# Patient Record
Sex: MALE | Race: BLACK OR AFRICAN AMERICAN | NOT HISPANIC OR LATINO | Employment: UNEMPLOYED | URBAN - METROPOLITAN AREA
[De-identification: names, ages, dates, MRNs, and addresses within clinical notes are randomized per-mention and may not be internally consistent; named-entity substitution may affect disease eponyms.]

---

## 2022-08-11 LAB
BASOPHILS # BLD AUTO: 0.6 % (ref 0–1.8)
BASOPHILS # BLD: 0.08 K/UL (ref 0–0.12)
EOSINOPHIL # BLD AUTO: 0.11 K/UL (ref 0–0.51)
EOSINOPHIL NFR BLD: 0.8 % (ref 0–6.9)
ERYTHROCYTE [DISTWIDTH] IN BLOOD BY AUTOMATED COUNT: 53.5 FL (ref 35.9–50)
HCT VFR BLD AUTO: 44.7 % (ref 42–52)
HGB BLD-MCNC: 15 G/DL (ref 14–18)
IMM GRANULOCYTES # BLD AUTO: 0.09 K/UL (ref 0–0.11)
IMM GRANULOCYTES NFR BLD AUTO: 0.6 % (ref 0–0.9)
LYMPHOCYTES # BLD AUTO: 1.91 K/UL (ref 1–4.8)
LYMPHOCYTES NFR BLD: 13.6 % (ref 22–41)
MCH RBC QN AUTO: 30 PG (ref 27–33)
MCHC RBC AUTO-ENTMCNC: 33.6 G/DL (ref 33.7–35.3)
MCV RBC AUTO: 89.4 FL (ref 81.4–97.8)
MONOCYTES # BLD AUTO: 1.44 K/UL (ref 0–0.85)
MONOCYTES NFR BLD AUTO: 10.3 % (ref 0–13.4)
NEUTROPHILS # BLD AUTO: 10.4 K/UL (ref 1.82–7.42)
NEUTROPHILS NFR BLD: 74.1 % (ref 44–72)
NRBC # BLD AUTO: 0 K/UL
NRBC BLD-RTO: 0 /100 WBC
PLATELET # BLD AUTO: 404 K/UL (ref 164–446)
PMV BLD AUTO: 9.1 FL (ref 9–12.9)
RBC # BLD AUTO: 5 M/UL (ref 4.7–6.1)
WBC # BLD AUTO: 14 K/UL (ref 4.8–10.8)

## 2022-08-11 PROCEDURE — 83690 ASSAY OF LIPASE: CPT

## 2022-08-11 PROCEDURE — 85025 COMPLETE CBC W/AUTO DIFF WBC: CPT

## 2022-08-11 PROCEDURE — 99285 EMERGENCY DEPT VISIT HI MDM: CPT

## 2022-08-11 PROCEDURE — 36415 COLL VENOUS BLD VENIPUNCTURE: CPT

## 2022-08-11 PROCEDURE — 80053 COMPREHEN METABOLIC PANEL: CPT

## 2022-08-12 ENCOUNTER — APPOINTMENT (OUTPATIENT)
Dept: RADIOLOGY | Facility: MEDICAL CENTER | Age: 42
DRG: 871 | End: 2022-08-12
Attending: INTERNAL MEDICINE
Payer: COMMERCIAL

## 2022-08-12 ENCOUNTER — APPOINTMENT (OUTPATIENT)
Dept: RADIOLOGY | Facility: MEDICAL CENTER | Age: 42
DRG: 871 | End: 2022-08-12
Attending: EMERGENCY MEDICINE
Payer: COMMERCIAL

## 2022-08-12 ENCOUNTER — HOSPITAL ENCOUNTER (INPATIENT)
Facility: MEDICAL CENTER | Age: 42
LOS: 3 days | DRG: 871 | End: 2022-08-15
Attending: EMERGENCY MEDICINE | Admitting: INTERNAL MEDICINE
Payer: COMMERCIAL

## 2022-08-12 DIAGNOSIS — N17.9 AKI (ACUTE KIDNEY INJURY) (HCC): ICD-10-CM

## 2022-08-12 DIAGNOSIS — R10.10 PAIN OF UPPER ABDOMEN: ICD-10-CM

## 2022-08-12 DIAGNOSIS — N28.0 KIDNEY INFARCTION (HCC): ICD-10-CM

## 2022-08-12 DIAGNOSIS — I50.20 HFREF (HEART FAILURE WITH REDUCED EJECTION FRACTION) (HCC): ICD-10-CM

## 2022-08-12 DIAGNOSIS — I50.23 CHF (CONGESTIVE HEART FAILURE), NYHA CLASS II, ACUTE ON CHRONIC, SYSTOLIC (HCC): ICD-10-CM

## 2022-08-12 PROBLEM — R65.10 SIRS (SYSTEMIC INFLAMMATORY RESPONSE SYNDROME) (HCC): Status: ACTIVE | Noted: 2022-08-12

## 2022-08-12 PROBLEM — N39.0 UTI (URINARY TRACT INFECTION): Status: ACTIVE | Noted: 2022-08-12

## 2022-08-12 PROBLEM — A41.9 SEPSIS (HCC): Status: ACTIVE | Noted: 2022-08-12

## 2022-08-12 PROBLEM — E66.9 OBESITY: Status: ACTIVE | Noted: 2022-08-12

## 2022-08-12 PROBLEM — R10.9 ABDOMINAL PAIN: Status: ACTIVE | Noted: 2022-08-12

## 2022-08-12 PROBLEM — R74.8 ELEVATED LIPASE: Status: ACTIVE | Noted: 2022-08-12

## 2022-08-12 PROBLEM — J18.9 PNEUMONIA: Status: ACTIVE | Noted: 2022-08-12

## 2022-08-12 PROBLEM — Z86.79 HISTORY OF CHRONIC CHF: Status: ACTIVE | Noted: 2022-08-12

## 2022-08-12 PROBLEM — K59.00 CN (CONSTIPATION): Status: ACTIVE | Noted: 2022-08-12

## 2022-08-12 LAB
ALBUMIN SERPL BCP-MCNC: 3.7 G/DL (ref 3.2–4.9)
ALBUMIN/GLOB SERPL: 0.9 G/DL
ALP SERPL-CCNC: 139 U/L (ref 30–99)
ALT SERPL-CCNC: 51 U/L (ref 2–50)
ANION GAP SERPL CALC-SCNC: 16 MMOL/L (ref 7–16)
APPEARANCE UR: CLEAR
APTT PPP: 26.7 SEC (ref 24.7–36)
AST SERPL-CCNC: 42 U/L (ref 12–45)
BACTERIA #/AREA URNS HPF: NEGATIVE /HPF
BILIRUB SERPL-MCNC: 1.3 MG/DL (ref 0.1–1.5)
BILIRUB UR QL STRIP.AUTO: NEGATIVE
BUN SERPL-MCNC: 19 MG/DL (ref 8–22)
CALCIUM SERPL-MCNC: 9.3 MG/DL (ref 8.5–10.5)
CHLORIDE SERPL-SCNC: 96 MMOL/L (ref 96–112)
CO2 SERPL-SCNC: 21 MMOL/L (ref 20–33)
COLOR UR: YELLOW
CREAT SERPL-MCNC: 1.7 MG/DL (ref 0.5–1.4)
CREAT UR-MCNC: 185.73 MG/DL
EKG IMPRESSION: NORMAL
EKG IMPRESSION: NORMAL
EPI CELLS #/AREA URNS HPF: NEGATIVE /HPF
FLUAV RNA SPEC QL NAA+PROBE: NEGATIVE
FLUBV RNA SPEC QL NAA+PROBE: NEGATIVE
GFR SERPLBLD CREATININE-BSD FMLA CKD-EPI: 50 ML/MIN/1.73 M 2
GLOBULIN SER CALC-MCNC: 4.2 G/DL (ref 1.9–3.5)
GLUCOSE SERPL-MCNC: 118 MG/DL (ref 65–99)
GLUCOSE UR STRIP.AUTO-MCNC: NEGATIVE MG/DL
HEMOCCULT STL QL: NEGATIVE
HYALINE CASTS #/AREA URNS LPF: ABNORMAL /LPF
INR PPP: 1.16 (ref 0.87–1.13)
KETONES UR STRIP.AUTO-MCNC: NEGATIVE MG/DL
LACTATE SERPL-SCNC: 1.1 MMOL/L (ref 0.5–2)
LACTATE SERPL-SCNC: 1.3 MMOL/L (ref 0.5–2)
LACTATE SERPL-SCNC: 1.6 MMOL/L (ref 0.5–2)
LDH SERPL L TO P-CCNC: 831 U/L (ref 107–266)
LEUKOCYTE ESTERASE UR QL STRIP.AUTO: ABNORMAL
LIPASE SERPL-CCNC: 107 U/L (ref 11–82)
MICRO URNS: ABNORMAL
NITRITE UR QL STRIP.AUTO: NEGATIVE
NT-PROBNP SERPL IA-MCNC: 904 PG/ML (ref 0–125)
PH UR STRIP.AUTO: 6 [PH] (ref 5–8)
POTASSIUM SERPL-SCNC: 4.3 MMOL/L (ref 3.6–5.5)
PROCALCITONIN SERPL-MCNC: 0.71 NG/ML
PROT SERPL-MCNC: 7.9 G/DL (ref 6–8.2)
PROT UR QL STRIP: 30 MG/DL
PROTHROMBIN TIME: 14.7 SEC (ref 12–14.6)
RBC # URNS HPF: ABNORMAL /HPF
RBC UR QL AUTO: NEGATIVE
RSV RNA SPEC QL NAA+PROBE: NEGATIVE
SARS-COV-2 RNA RESP QL NAA+PROBE: NOTDETECTED
SODIUM SERPL-SCNC: 133 MMOL/L (ref 135–145)
SODIUM UR-SCNC: 22 MMOL/L
SP GR UR STRIP.AUTO: 1.03
SPECIMEN SOURCE: NORMAL
UROBILINOGEN UR STRIP.AUTO-MCNC: 2 MG/DL
WBC #/AREA URNS HPF: ABNORMAL /HPF

## 2022-08-12 PROCEDURE — 85303 CLOT INHIBIT PROT C ACTIVITY: CPT

## 2022-08-12 PROCEDURE — 74174 CTA ABD&PLVS W/CONTRAST: CPT

## 2022-08-12 PROCEDURE — 700105 HCHG RX REV CODE 258: Performed by: INTERNAL MEDICINE

## 2022-08-12 PROCEDURE — C9803 HOPD COVID-19 SPEC COLLECT: HCPCS | Performed by: EMERGENCY MEDICINE

## 2022-08-12 PROCEDURE — 700111 HCHG RX REV CODE 636 W/ 250 OVERRIDE (IP): Performed by: EMERGENCY MEDICINE

## 2022-08-12 PROCEDURE — 87040 BLOOD CULTURE FOR BACTERIA: CPT

## 2022-08-12 PROCEDURE — 700101 HCHG RX REV CODE 250: Performed by: INTERNAL MEDICINE

## 2022-08-12 PROCEDURE — 84145 PROCALCITONIN (PCT): CPT

## 2022-08-12 PROCEDURE — 700102 HCHG RX REV CODE 250 W/ 637 OVERRIDE(OP): Performed by: INTERNAL MEDICINE

## 2022-08-12 PROCEDURE — 83605 ASSAY OF LACTIC ACID: CPT | Mod: 91

## 2022-08-12 PROCEDURE — 36415 COLL VENOUS BLD VENIPUNCTURE: CPT

## 2022-08-12 PROCEDURE — 96376 TX/PRO/DX INJ SAME DRUG ADON: CPT

## 2022-08-12 PROCEDURE — 83880 ASSAY OF NATRIURETIC PEPTIDE: CPT

## 2022-08-12 PROCEDURE — 74177 CT ABD & PELVIS W/CONTRAST: CPT

## 2022-08-12 PROCEDURE — 82272 OCCULT BLD FECES 1-3 TESTS: CPT

## 2022-08-12 PROCEDURE — 93010 ELECTROCARDIOGRAM REPORT: CPT | Performed by: INTERNAL MEDICINE

## 2022-08-12 PROCEDURE — 83615 LACTATE (LD) (LDH) ENZYME: CPT

## 2022-08-12 PROCEDURE — A9270 NON-COVERED ITEM OR SERVICE: HCPCS | Performed by: INTERNAL MEDICINE

## 2022-08-12 PROCEDURE — 700111 HCHG RX REV CODE 636 W/ 250 OVERRIDE (IP)

## 2022-08-12 PROCEDURE — 81240 F2 GENE: CPT

## 2022-08-12 PROCEDURE — 87086 URINE CULTURE/COLONY COUNT: CPT

## 2022-08-12 PROCEDURE — 85610 PROTHROMBIN TIME: CPT

## 2022-08-12 PROCEDURE — 82570 ASSAY OF URINE CREATININE: CPT

## 2022-08-12 PROCEDURE — 93005 ELECTROCARDIOGRAM TRACING: CPT | Performed by: INTERNAL MEDICINE

## 2022-08-12 PROCEDURE — 84300 ASSAY OF URINE SODIUM: CPT

## 2022-08-12 PROCEDURE — 96374 THER/PROPH/DIAG INJ IV PUSH: CPT

## 2022-08-12 PROCEDURE — 81241 F5 GENE: CPT

## 2022-08-12 PROCEDURE — 96375 TX/PRO/DX INJ NEW DRUG ADDON: CPT

## 2022-08-12 PROCEDURE — 81001 URINALYSIS AUTO W/SCOPE: CPT

## 2022-08-12 PROCEDURE — 71045 X-RAY EXAM CHEST 1 VIEW: CPT

## 2022-08-12 PROCEDURE — 700117 HCHG RX CONTRAST REV CODE 255: Performed by: EMERGENCY MEDICINE

## 2022-08-12 PROCEDURE — 85730 THROMBOPLASTIN TIME PARTIAL: CPT

## 2022-08-12 PROCEDURE — 85306 CLOT INHIBIT PROT S FREE: CPT

## 2022-08-12 PROCEDURE — 700111 HCHG RX REV CODE 636 W/ 250 OVERRIDE (IP): Performed by: INTERNAL MEDICINE

## 2022-08-12 PROCEDURE — 700105 HCHG RX REV CODE 258: Performed by: EMERGENCY MEDICINE

## 2022-08-12 PROCEDURE — 85300 ANTITHROMBIN III ACTIVITY: CPT

## 2022-08-12 PROCEDURE — 700117 HCHG RX CONTRAST REV CODE 255: Performed by: INTERNAL MEDICINE

## 2022-08-12 PROCEDURE — 770020 HCHG ROOM/CARE - TELE (206)

## 2022-08-12 PROCEDURE — 0241U HCHG SARS-COV-2 COVID-19 NFCT DS RESP RNA 4 TRGT MIC: CPT

## 2022-08-12 RX ORDER — BISACODYL 10 MG
10 SUPPOSITORY, RECTAL RECTAL
Status: DISCONTINUED | OUTPATIENT
Start: 2022-08-12 | End: 2022-08-15 | Stop reason: HOSPADM

## 2022-08-12 RX ORDER — SODIUM CHLORIDE 9 MG/ML
INJECTION, SOLUTION INTRAVENOUS CONTINUOUS
Status: DISCONTINUED | OUTPATIENT
Start: 2022-08-12 | End: 2022-08-15 | Stop reason: HOSPADM

## 2022-08-12 RX ORDER — POLYETHYLENE GLYCOL 3350 17 G/17G
1 POWDER, FOR SOLUTION ORAL 2 TIMES DAILY
Status: DISCONTINUED | OUTPATIENT
Start: 2022-08-12 | End: 2022-08-15 | Stop reason: HOSPADM

## 2022-08-12 RX ORDER — LOSARTAN POTASSIUM 25 MG/1
25 TABLET ORAL DAILY
Status: ON HOLD | COMMUNITY
End: 2022-08-15

## 2022-08-12 RX ORDER — ACETAMINOPHEN 325 MG/1
650 TABLET ORAL EVERY 6 HOURS PRN
Status: DISCONTINUED | OUTPATIENT
Start: 2022-08-12 | End: 2022-08-15 | Stop reason: HOSPADM

## 2022-08-12 RX ORDER — BISACODYL 10 MG
10 SUPPOSITORY, RECTAL RECTAL
Status: DISCONTINUED | OUTPATIENT
Start: 2022-08-12 | End: 2022-08-12

## 2022-08-12 RX ORDER — FAMOTIDINE 20 MG/1
20 TABLET, FILM COATED ORAL 2 TIMES DAILY
Status: DISCONTINUED | OUTPATIENT
Start: 2022-08-12 | End: 2022-08-15 | Stop reason: HOSPADM

## 2022-08-12 RX ORDER — GUAIFENESIN/DEXTROMETHORPHAN 100-10MG/5
10 SYRUP ORAL EVERY 6 HOURS PRN
Status: DISCONTINUED | OUTPATIENT
Start: 2022-08-12 | End: 2022-08-15 | Stop reason: HOSPADM

## 2022-08-12 RX ORDER — MORPHINE SULFATE 4 MG/ML
4 INJECTION INTRAVENOUS ONCE
Status: COMPLETED | OUTPATIENT
Start: 2022-08-12 | End: 2022-08-12

## 2022-08-12 RX ORDER — SODIUM CHLORIDE, SODIUM LACTATE, POTASSIUM CHLORIDE, AND CALCIUM CHLORIDE .6; .31; .03; .02 G/100ML; G/100ML; G/100ML; G/100ML
750 INJECTION, SOLUTION INTRAVENOUS ONCE
Status: COMPLETED | OUTPATIENT
Start: 2022-08-12 | End: 2022-08-12

## 2022-08-12 RX ORDER — ENEMA 19; 7 G/133ML; G/133ML
1 ENEMA RECTAL ONCE
Status: COMPLETED | OUTPATIENT
Start: 2022-08-12 | End: 2022-08-12

## 2022-08-12 RX ORDER — LABETALOL HYDROCHLORIDE 5 MG/ML
10 INJECTION, SOLUTION INTRAVENOUS EVERY 4 HOURS PRN
Status: DISCONTINUED | OUTPATIENT
Start: 2022-08-12 | End: 2022-08-15 | Stop reason: HOSPADM

## 2022-08-12 RX ORDER — FUROSEMIDE 40 MG/1
40 TABLET ORAL DAILY
COMMUNITY
End: 2022-08-30 | Stop reason: SDUPTHER

## 2022-08-12 RX ORDER — POTASSIUM CHLORIDE 20 MEQ/1
20 TABLET, EXTENDED RELEASE ORAL DAILY
Status: ON HOLD | COMMUNITY
End: 2022-08-15

## 2022-08-12 RX ORDER — PROMETHAZINE HYDROCHLORIDE 25 MG/1
12.5-25 SUPPOSITORY RECTAL EVERY 4 HOURS PRN
Status: DISCONTINUED | OUTPATIENT
Start: 2022-08-12 | End: 2022-08-15 | Stop reason: HOSPADM

## 2022-08-12 RX ORDER — ONDANSETRON 2 MG/ML
4 INJECTION INTRAMUSCULAR; INTRAVENOUS EVERY 4 HOURS PRN
Status: DISCONTINUED | OUTPATIENT
Start: 2022-08-12 | End: 2022-08-15 | Stop reason: HOSPADM

## 2022-08-12 RX ORDER — ENOXAPARIN SODIUM 100 MG/ML
40 INJECTION SUBCUTANEOUS DAILY
Status: DISCONTINUED | OUTPATIENT
Start: 2022-08-12 | End: 2022-08-12

## 2022-08-12 RX ORDER — PROMETHAZINE HYDROCHLORIDE 25 MG/1
12.5-25 TABLET ORAL EVERY 4 HOURS PRN
Status: DISCONTINUED | OUTPATIENT
Start: 2022-08-12 | End: 2022-08-15 | Stop reason: HOSPADM

## 2022-08-12 RX ORDER — POLYETHYLENE GLYCOL 3350 17 G/17G
1 POWDER, FOR SOLUTION ORAL
Status: DISCONTINUED | OUTPATIENT
Start: 2022-08-12 | End: 2022-08-12

## 2022-08-12 RX ORDER — OXYCODONE HYDROCHLORIDE 5 MG/1
2.5 TABLET ORAL
Status: DISCONTINUED | OUTPATIENT
Start: 2022-08-12 | End: 2022-08-15 | Stop reason: HOSPADM

## 2022-08-12 RX ORDER — ONDANSETRON 4 MG/1
4 TABLET, ORALLY DISINTEGRATING ORAL EVERY 4 HOURS PRN
Status: DISCONTINUED | OUTPATIENT
Start: 2022-08-12 | End: 2022-08-15 | Stop reason: HOSPADM

## 2022-08-12 RX ORDER — PROCHLORPERAZINE EDISYLATE 5 MG/ML
5-10 INJECTION INTRAMUSCULAR; INTRAVENOUS EVERY 4 HOURS PRN
Status: DISCONTINUED | OUTPATIENT
Start: 2022-08-12 | End: 2022-08-15 | Stop reason: HOSPADM

## 2022-08-12 RX ORDER — OXYCODONE HYDROCHLORIDE 5 MG/1
5 TABLET ORAL
Status: DISCONTINUED | OUTPATIENT
Start: 2022-08-12 | End: 2022-08-15 | Stop reason: HOSPADM

## 2022-08-12 RX ORDER — AMOXICILLIN 250 MG
2 CAPSULE ORAL 2 TIMES DAILY
Status: DISCONTINUED | OUTPATIENT
Start: 2022-08-12 | End: 2022-08-15 | Stop reason: HOSPADM

## 2022-08-12 RX ORDER — AMOXICILLIN 250 MG
2 CAPSULE ORAL 2 TIMES DAILY
Status: DISCONTINUED | OUTPATIENT
Start: 2022-08-12 | End: 2022-08-12

## 2022-08-12 RX ORDER — ONDANSETRON 2 MG/ML
4 INJECTION INTRAMUSCULAR; INTRAVENOUS ONCE
Status: COMPLETED | OUTPATIENT
Start: 2022-08-12 | End: 2022-08-12

## 2022-08-12 RX ORDER — HYDROMORPHONE HYDROCHLORIDE 1 MG/ML
0.25 INJECTION, SOLUTION INTRAMUSCULAR; INTRAVENOUS; SUBCUTANEOUS
Status: DISCONTINUED | OUTPATIENT
Start: 2022-08-12 | End: 2022-08-15 | Stop reason: HOSPADM

## 2022-08-12 RX ORDER — SODIUM CHLORIDE 9 MG/ML
1000 INJECTION, SOLUTION INTRAVENOUS ONCE
Status: COMPLETED | OUTPATIENT
Start: 2022-08-12 | End: 2022-08-12

## 2022-08-12 RX ORDER — SUCRALFATE 1 G/1
1 TABLET ORAL EVERY 6 HOURS
Status: COMPLETED | OUTPATIENT
Start: 2022-08-12 | End: 2022-08-14

## 2022-08-12 RX ADMIN — CEFTRIAXONE SODIUM 2 G: 10 INJECTION, POWDER, FOR SOLUTION INTRAVENOUS at 06:33

## 2022-08-12 RX ADMIN — FAMOTIDINE 20 MG: 20 TABLET, FILM COATED ORAL at 05:49

## 2022-08-12 RX ADMIN — MORPHINE SULFATE 4 MG: 4 INJECTION INTRAVENOUS at 03:34

## 2022-08-12 RX ADMIN — IOHEXOL 100 ML: 350 INJECTION, SOLUTION INTRAVENOUS at 01:40

## 2022-08-12 RX ADMIN — POLYETHYLENE GLYCOL 3350 1 PACKET: 17 POWDER, FOR SOLUTION ORAL at 06:00

## 2022-08-12 RX ADMIN — ONDANSETRON 4 MG: 2 INJECTION INTRAMUSCULAR; INTRAVENOUS at 06:34

## 2022-08-12 RX ADMIN — SODIUM CHLORIDE: 9 INJECTION, SOLUTION INTRAVENOUS at 09:48

## 2022-08-12 RX ADMIN — SUCRALFATE 1 G: 1 TABLET ORAL at 17:25

## 2022-08-12 RX ADMIN — SUCRALFATE 1 G: 1 TABLET ORAL at 05:49

## 2022-08-12 RX ADMIN — METOPROLOL TARTRATE 12.5 MG: 25 TABLET, FILM COATED ORAL at 09:46

## 2022-08-12 RX ADMIN — SODIUM CHLORIDE 1000 ML: 9 INJECTION, SOLUTION INTRAVENOUS at 01:01

## 2022-08-12 RX ADMIN — IOHEXOL 100 ML: 350 INJECTION, SOLUTION INTRAVENOUS at 09:40

## 2022-08-12 RX ADMIN — ONDANSETRON 4 MG: 2 INJECTION INTRAMUSCULAR; INTRAVENOUS at 01:01

## 2022-08-12 RX ADMIN — SUCRALFATE 1 G: 1 TABLET ORAL at 23:30

## 2022-08-12 RX ADMIN — SODIUM CHLORIDE, POTASSIUM CHLORIDE, SODIUM LACTATE AND CALCIUM CHLORIDE 750 ML: 600; 310; 30; 20 INJECTION, SOLUTION INTRAVENOUS at 05:49

## 2022-08-12 RX ADMIN — SODIUM CHLORIDE: 9 INJECTION, SOLUTION INTRAVENOUS at 19:13

## 2022-08-12 RX ADMIN — FAMOTIDINE 20 MG: 20 TABLET, FILM COATED ORAL at 17:24

## 2022-08-12 RX ADMIN — MORPHINE SULFATE 4 MG: 4 INJECTION INTRAVENOUS at 01:01

## 2022-08-12 RX ADMIN — POLYETHYLENE GLYCOL 3350 1 PACKET: 17 POWDER, FOR SOLUTION ORAL at 05:50

## 2022-08-12 RX ADMIN — SUCRALFATE 1 G: 1 TABLET ORAL at 12:11

## 2022-08-12 RX ADMIN — SODIUM PHOSPHATE 133 ML: 7; 19 ENEMA RECTAL at 12:12

## 2022-08-12 RX ADMIN — SENNOSIDES AND DOCUSATE SODIUM 2 TABLET: 50; 8.6 TABLET ORAL at 05:50

## 2022-08-12 RX ADMIN — POLYETHYLENE GLYCOL 3350 1 PACKET: 17 POWDER, FOR SOLUTION ORAL at 17:25

## 2022-08-12 RX ADMIN — APIXABAN 10 MG: 5 TABLET, FILM COATED ORAL at 12:11

## 2022-08-12 RX ADMIN — SODIUM CHLORIDE: 9 INJECTION, SOLUTION INTRAVENOUS at 11:33

## 2022-08-12 RX ADMIN — SENNOSIDES AND DOCUSATE SODIUM 2 TABLET: 50; 8.6 TABLET ORAL at 17:25

## 2022-08-12 RX ADMIN — METOPROLOL TARTRATE 12.5 MG: 25 TABLET, FILM COATED ORAL at 17:23

## 2022-08-12 ASSESSMENT — ENCOUNTER SYMPTOMS
NERVOUS/ANXIOUS: 0
FOCAL WEAKNESS: 0
POLYDIPSIA: 0
HALLUCINATIONS: 0
WEIGHT LOSS: 0
HEMOPTYSIS: 0
TREMORS: 0
NAUSEA: 0
BRUISES/BLEEDS EASILY: 0
VOMITING: 1
BACK PAIN: 0
NECK PAIN: 0
CHILLS: 0
BLURRED VISION: 0
SPEECH CHANGE: 0
PHOTOPHOBIA: 0
COUGH: 0
ABDOMINAL PAIN: 1
CONSTIPATION: 1
HEARTBURN: 0
SPUTUM PRODUCTION: 0
FLANK PAIN: 0
PALPITATIONS: 0
BLOOD IN STOOL: 0
DIARRHEA: 0
FEVER: 0
HEADACHES: 0
VOMITING: 0
DOUBLE VISION: 0
ORTHOPNEA: 0

## 2022-08-12 ASSESSMENT — LIFESTYLE VARIABLES
HAVE YOU EVER FELT YOU SHOULD CUT DOWN ON YOUR DRINKING: NO
EVER HAD A DRINK FIRST THING IN THE MORNING TO STEADY YOUR NERVES TO GET RID OF A HANGOVER: NO
TOTAL SCORE: 0
ON A TYPICAL DAY WHEN YOU DRINK ALCOHOL HOW MANY DRINKS DO YOU HAVE: 0
EVER FELT BAD OR GUILTY ABOUT YOUR DRINKING: NO
HOW MANY TIMES IN THE PAST YEAR HAVE YOU HAD 5 OR MORE DRINKS IN A DAY: 0
HAVE PEOPLE ANNOYED YOU BY CRITICIZING YOUR DRINKING: NO
SUBSTANCE_ABUSE: 0
CONSUMPTION TOTAL: NEGATIVE
TOTAL SCORE: 0
TOTAL SCORE: 0
AVERAGE NUMBER OF DAYS PER WEEK YOU HAVE A DRINK CONTAINING ALCOHOL: 0
ALCOHOL_USE: NO

## 2022-08-12 ASSESSMENT — COGNITIVE AND FUNCTIONAL STATUS - GENERAL
STANDING UP FROM CHAIR USING ARMS: A LITTLE
WALKING IN HOSPITAL ROOM: A LITTLE
MOBILITY SCORE: 21
SUGGESTED CMS G CODE MODIFIER DAILY ACTIVITY: CH
CLIMB 3 TO 5 STEPS WITH RAILING: A LITTLE
SUGGESTED CMS G CODE MODIFIER MOBILITY: CJ
DAILY ACTIVITIY SCORE: 24

## 2022-08-12 ASSESSMENT — FIBROSIS 4 INDEX: FIB4 SCORE: 0.63

## 2022-08-12 ASSESSMENT — PATIENT HEALTH QUESTIONNAIRE - PHQ9
1. LITTLE INTEREST OR PLEASURE IN DOING THINGS: NOT AT ALL
2. FEELING DOWN, DEPRESSED, IRRITABLE, OR HOPELESS: NOT AT ALL
SUM OF ALL RESPONSES TO PHQ9 QUESTIONS 1 AND 2: 0

## 2022-08-12 ASSESSMENT — PAIN DESCRIPTION - PAIN TYPE
TYPE: ACUTE PAIN

## 2022-08-12 NOTE — ED NOTES
Second set of blood cultures drawn and sent ro lab. Pt medicated per MAR. Pt became nauseated and vomited. No appearance of blood in vomit. Pt medicated for N/V.

## 2022-08-12 NOTE — ASSESSMENT & PLAN NOTE
Patient has been on Lasix at home.  Denies taking NSAID  CT of the abdomen and pelvis as noted with evidence of bilateral nephritis and renal function on the right side  UA showed mild proteinuria, no sediment  Plan: Obtain bladder scan to rule out urine obstruction and Hurt if needed, urine electrolytes  Bolus of 750 cc of LR ordered  Will hold Lasix and losartan

## 2022-08-12 NOTE — ASSESSMENT & PLAN NOTE
CT of the abdomen pelvis with contrast small area of renal infarction on the right side, correlating with right flank pain for 1 week   Plan: Obtain EKG and monitor on telemetry for A. Fib.  Transthoracic echo to evaluate for intracardiac thrombi still pending.  CTA per above.    Afterwards consider Thrombophilia work-up, vascular surgery consult and starting systemic anticoagulation if etiology of infarction established (thromboembolism versus dissection of the artery)  - CTA revealed posterior inferior aspect of the right kidney with perinephric fatty stranding consistent with renal infarction  - Admitting MD spoke with Dr. Fagan who did not think this needed surgical intervention  - f/u echo  - will start eliquis for now

## 2022-08-12 NOTE — PROGRESS NOTES
4 Eyes Skin Assessment Completed by RUFUS Calle and RUFUS Schilling.    Head WDL  Ears WDL  Nose WDL  Mouth WDL  Neck WDL  Breast/Chest WDL  Shoulder Blades WDL  Spine WDL  (R) Arm/Elbow/Hand Scab  (L) Arm/Elbow/Hand Scab  Abdomen WDL  Groin WDL  Scrotum/Coccyx/Buttocks WDL  (R) Leg Scab  (L) Leg Scab  (R) Heel/Foot/Toe Scab and Dry  (L) Heel/Foot/Toe Scab and Dry          Devices In Places Tele Box, Pulse Ox, and Nasal Cannula      Interventions In Place Pressure Redistribution Mattress    Possible Skin Injury No    Pictures Uploaded Into Epic N/A  Wound Consult Placed N/A  RN Wound Prevention Protocol Ordered No

## 2022-08-12 NOTE — ED NOTES
Bedside report given to Alva HOOPER. Pt transported on zoll to floor with all belongings in stable condition

## 2022-08-12 NOTE — H&P
Hospital Medicine History & Physical Note    Date of Service  8/12/2022    Primary Care Physician  Pcp Pt States None    Discussed with interventional radiologist/Dr. Clement    Code Status  Full Code    Chief Complaint  Chief Complaint   Patient presents with    Constipation     Has not had a bowel movement for over a week, took stool softeners, ducalax and Mg Citrate today without relief    Abdominal Pain       History of Presenting Illness  Oracio Jurado is a 41 y.o. male with past medical history of obesity, heart failure (?),  Hypertension, alcohol dependence in remission, who presented 8/12/2022 with constipation with no bowel movement for 2 weeks, abdominal discomfort for 1 week, episode of vomiting 5 days ago, intermittent dull right flank pain without radiation for 1 week.  Blood work showed WBC 14.  Creatinine elevated at 1.7.  There is no recent creatinine done.  In 2016 his creatinine was 1.3.  Patient states he was taking Lasix at home.  His chronic lower extremity edema significantly improved lately.  He denies taking NSAID.  UA showed some evidence of infection.  Patient denies dysuria.  CT of the abdomen and pelvis suggestive of bilateral nephritis, as well as possible small area of renal infarction on the right side.  There is peripheral patchy opacity in left lung base and trace left pleural effusion, and upon questioning patient admits having dry cough in the last 2 weeks.  Patient received IV morphine and now he does not feel any tenderness even with deep palpation.  There is mild right CVA tenderness.    I discussed the plan of care with patient.    Review of Systems  Review of Systems   Constitutional:  Negative for chills, fever and weight loss.   HENT:  Negative for ear pain, hearing loss and tinnitus.    Eyes:  Negative for blurred vision, double vision and photophobia.   Respiratory:  Negative for cough, hemoptysis and sputum production.    Cardiovascular:  Negative for chest pain,  palpitations and orthopnea.   Gastrointestinal:  Positive for abdominal pain, constipation and vomiting. Negative for diarrhea, heartburn and nausea.   Genitourinary:  Negative for dysuria, flank pain, frequency and hematuria.   Musculoskeletal:  Negative for back pain, joint pain and neck pain.   Skin:  Negative for itching and rash.   Neurological:  Negative for tremors, speech change, focal weakness and headaches.   Endo/Heme/Allergies:  Negative for environmental allergies and polydipsia. Does not bruise/bleed easily.   Psychiatric/Behavioral:  Negative for hallucinations and substance abuse. The patient is not nervous/anxious.      Past Medical History   has no past medical history on file.    Surgical History   has no past surgical history on file.     Family History     Family history reviewed with patient. There is no family history that is pertinent to the chief complaint.     Social History   reports that he has never smoked. He has never used smokeless tobacco. He reports that he does not currently use alcohol. He reports that he does not currently use drugs.    Allergies  No Known Allergies    Medications  None       Physical Exam  Temp:  [37 °C (98.6 °F)] 37 °C (98.6 °F)  Pulse:  [111-125] 111  Resp:  [15-18] 15  BP: (121-135)/(75-83) 121/75  SpO2:  [86 %-95 %] 91 %  Blood Pressure: 121/75   Temperature: 37 °C (98.6 °F)   Pulse: (!) 111   Respiration: 15   Pulse Oximetry: 91 %       Physical Exam  Vitals and nursing note reviewed.   Constitutional:       General: He is not in acute distress.     Appearance: Normal appearance. He is obese.   HENT:      Head: Normocephalic and atraumatic.      Nose: Nose normal.      Mouth/Throat:      Mouth: Mucous membranes are moist.   Eyes:      Extraocular Movements: Extraocular movements intact.      Pupils: Pupils are equal, round, and reactive to light.   Cardiovascular:      Rate and Rhythm: Normal rate and regular rhythm.   Pulmonary:      Effort: Pulmonary  effort is normal.      Breath sounds: Normal breath sounds.   Abdominal:      General: Abdomen is flat. There is no distension.      Tenderness: There is no abdominal tenderness.   Musculoskeletal:         General: No swelling or deformity. Normal range of motion.      Cervical back: Normal range of motion and neck supple.   Skin:     General: Skin is warm and dry.   Neurological:      General: No focal deficit present.      Mental Status: He is alert and oriented to person, place, and time.   Psychiatric:         Mood and Affect: Mood normal.         Behavior: Behavior normal.       Laboratory:  Recent Labs     08/11/22  2336   WBC 14.0*   RBC 5.00   HEMOGLOBIN 15.0   HEMATOCRIT 44.7   MCV 89.4   MCH 30.0   MCHC 33.6*   RDW 53.5*   PLATELETCT 404   MPV 9.1     Recent Labs     08/11/22  2336   SODIUM 133*   POTASSIUM 4.3   CHLORIDE 96   CO2 21   GLUCOSE 118*   BUN 19   CREATININE 1.70*   CALCIUM 9.3     Recent Labs     08/11/22  2336   ALTSGPT 51*   ASTSGOT 42   ALKPHOSPHAT 139*   TBILIRUBIN 1.3   LIPASE 107*   GLUCOSE 118*         No results for input(s): NTPROBNP in the last 72 hours.      No results for input(s): TROPONINT in the last 72 hours.    Imaging:  DX-CHEST-PORTABLE (1 VIEW)   Final Result         1.  No acute cardiopulmonary disease.   2.  Cardiomegaly      CT-ABDOMEN-PELVIS WITH   Final Result         1.  Irregular areas of enhancement of the right kidney with hazy peripheral fat stranding, appearance suggests changes of bilateral nephritis, correlate with urinalysis. Given somewhat wedge-shaped morphology appearance could represent small areas of    renal infarction.   2.  Peripheral patchy opacity in the left lung base, appearance compatible with infiltrates, consider Covid infiltrates as clinically appropriate.   3.  Small fat-containing right inguinal hernia   4.  Trace left pleural effusion      CTA ABDOMEN PELVIS W & W/O POST PROCESS    (Results Pending)           Assessment/Plan:  Justification  for Admission Status  I anticipate this patient will require at least two midnights for appropriate medical management, necessitating inpatient admission because UTI, sepsis, kidney function, MATTHIAS    Patient will need a Telemetry bed on TELEMETRY service .  The need is secondary to monitor for arrhythmia.    Kidney infarction (HCC)  Assessment & Plan  CT of the abdomen pelvis with contrast small area of renal infarction on the right side, correlating with right flank pain for 1 week   Plan: Obtain EKG and monitor on telemetry for A. Fib.  Transthoracic echo to evaluate for intracardiac thrombi.  Ordered follow-up CTA of the abdomen to evaluate for obstruction or dissection  Afterwards consider Thrombophilia work-up, vascular surgery consult and starting systemic anticoagulation if etiology of infarction established (thromboembolism versus dissection of the artery)      Pneumonia  Assessment & Plan  CT of the abdomen pelvis showed peripheral opacities in the left lung base, trace pleural effusion  Patient reports dry cough last couple weeks  COVID-19/influenza/RSV negative  He will be covered with ceftriaxone  RT protocol, supplemental oxygen as needed.  Wean off as tolerated    Sepsis (HCC)  Assessment & Plan  This is Sepsis Present on admission  SIRS criteria identified on my evaluation include: Tachycardia, with heart rate greater than 90 BPM and Leukocytosis, with WBC greater than 12,000  Source is UTI versus renal infarction versus pneumonia  Sepsis protocol initiated  Fluid resuscitation ordered per protocol  Crystalloid Fluid Administration: Patient has advanced or end-stage heart failure (with documentation of NYHA class III or symptoms with minimal exertion, Or NYHA class IV or symptoms at rest or with activity), for this/these reason(s) it is unsafe for this patient to receive 30 mL/kg of fluid  Partial Fluid Dose Given: 750 cc, patient to be reassessed shortly after completion of partial fluid bolus to  ensure adequacy of resuscitation  IV antibiotics as appropriate for source of sepsis  Reassessment: I have reassessed the patient's hemodynamic status          Elevated lipase  Assessment & Plan  Lipase 107.?  Mild pancreatitis  History of alcohol dependence in remission, denies drinking alcohol in the last month  No evidence of biliary stone/cholecystitis, CBD is nondilated, pancreas is normal on CT  Pepcid, sucralfate, Zofran as needed  Clear liquid diet as tolerated    History of chronic CHF  Assessment & Plan  Does not appear to be in exacerbation  According to the patient, lower extremity edema gone down significantly in the last months.  Has been on Lasix.  Monitor volume status closely while giving bolus of crystalloids  Will hold Lasix and losartan due to acute kidney injury    Acute kidney injury (HCC)  Assessment & Plan  Patient has been on Lasix at home.  Denies taking NSAID  CT of the abdomen and pelvis as noted with evidence of bilateral nephritis and renal function on the right side  UA showed mild proteinuria, no sediment  Plan: Obtain bladder scan to rule out urine obstruction and Hurt if needed, urine electrolytes  Bolus of 750 cc of LR ordered  Will hold Lasix and losartan    Obesity  Assessment & Plan  BMI 43  Follow-up with PCP for outpatient management    UTI (urinary tract infection)  Assessment & Plan  IV ceftriaxone  Follow-up with urine culture    CN (constipation)  Assessment & Plan  Chills abdomen pelvis does not show obstruction of the intestine  Ordered MiraLAX twice a day  Consider Fleet enema      VTE prophylaxis: enoxaparin ppx

## 2022-08-12 NOTE — ASSESSMENT & PLAN NOTE
This is Sepsis Present on admission  SIRS criteria identified on my evaluation include: Tachycardia, with heart rate greater than 90 BPM and Leukocytosis, with WBC greater than 12,000  Source is UTI versus renal infarction versus pneumonia  Sepsis protocol initiated  Fluid resuscitation ordered per protocol  Crystalloid Fluid Administration: Patient has advanced or end-stage heart failure (with documentation of NYHA class III or symptoms with minimal exertion, Or NYHA class IV or symptoms at rest or with activity), for this/these reason(s) it is unsafe for this patient to receive 30 mL/kg of fluid  Partial Fluid Dose Given: 750 cc, patient to be reassessed shortly after completion of partial fluid bolus to ensure adequacy of resuscitation  IV antibiotics as appropriate for source of sepsis  Reassessment: I have reassessed the patient's hemodynamic status

## 2022-08-12 NOTE — ASSESSMENT & PLAN NOTE
Does not appear to be in exacerbation  According to the patient, lower extremity edema gone down significantly in the last months.  Has been on Lasix.  Monitor volume status closely while giving bolus of crystalloids  Will hold Lasix and losartan due to acute kidney injury

## 2022-08-12 NOTE — ED NOTES
Blood drawn for new lab orders. Pt medicated per MAR. Lab contacted for second set of blood cultures.

## 2022-08-12 NOTE — ED NOTES
Med rec complete per pt with medications at bedside.  Per pt he was told by doctor to start Iron, Multivitamin, and Aspirin. He has not started any yet.     Allergies reviewed and updated.

## 2022-08-12 NOTE — ASSESSMENT & PLAN NOTE
CT of the abdomen pelvis showed peripheral opacities in the left lung base, trace pleural effusion  Patient reports dry cough last couple weeks  COVID-19/influenza/RSV negative  He will be covered with ceftriaxone  RT protocol, supplemental oxygen as needed.  Wean off as tolerated

## 2022-08-12 NOTE — CARE PLAN
The patient is Watcher - Medium risk of patient condition declining or worsening         Progress made toward(s) clinical / shift goals:        Problem: Pain - Standard  Goal: Alleviation of pain or a reduction in pain to the patient’s comfort goal  Outcome: Progressing  Note: Patient has reported 0/10 pain. Utilizing 0-10 pain scale.     Problem: Knowledge Deficit - Standard  Goal: Patient and family/care givers will demonstrate understanding of plan of care, disease process/condition, diagnostic tests and medications  Outcome: Progressing  Note: Patient verbally demonstrates understanding of POC and disease process

## 2022-08-12 NOTE — PROGRESS NOTES
Assumed care of patient, received bedside report from ER RN Kathleen. Patient is A&O X 4. Pain 0/10. Vital signs stable, on 1L NC. On tele monitor, . 2 RN skin check completed. POC discussed with patient and he verbalized understanding. Call light within reach and fall precautions in place. Bed locked and in lowest position.

## 2022-08-12 NOTE — PROGRESS NOTES
Hospital Medicine Daily Progress Note    Date of Service  8/12/2022    Chief Complaint  Oracio Jurado is a 41 y.o. male admitted 8/12/2022 with abdominal pain and constipation    Hospital Course  No notes on file    Interval Problem Update  - admitted this am  - CTA showed no aortic aneurysm or dissection, no significant atherosclerotic changes or branch artery stenosis.  Did show decrease/not enhancement of the posterior inferior aspect of the right kidney with perinephric fatty stranding consistent with renal infarction.  Small filling defect in the distal vessel supplying this area could represent a focal dissection or embolus.  Recommended echo.  Retained contrast in the areas of the bilateral kidneys on precontrast imaging from prior CT concerning for some degree of renal dysfunction.  As well as trace left pleural effusion and patchy peripheral left base consolidation again concerning for COVID-pneumonia.  -COVID-negative  -Requiring 2 L nasal cannula  -Reports abdominal pain improved  -On ceftriaxone  - starting eliquis    I have discussed this patient's plan of care and discharge plan at IDT rounds today with Case Management, Nursing, Nursing leadership, and other members of the IDT team.    Consultants/Specialty  None    Code Status  Full Code    Disposition  Patient is not medically cleared for discharge.   Anticipate discharge to to home with close outpatient follow-up.  I have placed the appropriate orders for post-discharge needs.    Review of Systems  Review of Systems   Gastrointestinal:  Positive for abdominal pain and constipation. Negative for blood in stool, melena, nausea and vomiting.      Physical Exam  Temp:  [37 °C (98.6 °F)] 37 °C (98.6 °F)  Pulse:  [100-125] 100  Resp:  [14-18] 14  BP: (117-135)/(67-86) 117/67  SpO2:  [86 %-95 %] 90 %    Physical Exam  Constitutional:       General: He is not in acute distress.     Appearance: He is obese. He is not ill-appearing, toxic-appearing or  diaphoretic.   HENT:      Head: Normocephalic and atraumatic.      Nose: Nose normal.      Mouth/Throat:      Mouth: Mucous membranes are moist.   Eyes:      General: No scleral icterus.     Conjunctiva/sclera: Conjunctivae normal.   Cardiovascular:      Rate and Rhythm: Regular rhythm. Tachycardia present.      Heart sounds: No murmur heard.    No friction rub. No gallop.   Pulmonary:      Effort: Pulmonary effort is normal.      Breath sounds: Rales present. No wheezing or rhonchi.   Abdominal:      General: Bowel sounds are normal. There is distension.      Palpations: Abdomen is soft.      Tenderness: no abdominal tenderness   Musculoskeletal:      Cervical back: Normal range of motion.      Right lower leg: No edema.      Left lower leg: No edema.   Skin:     Coloration: Skin is not jaundiced.      Findings: No rash.   Neurological:      Mental Status: He is alert and oriented to person, place, and time. Mental status is at baseline.   Psychiatric:         Mood and Affect: Mood normal.         Behavior: Behavior normal.       Fluids    Intake/Output Summary (Last 24 hours) at 8/12/2022 1034  Last data filed at 8/12/2022 0110  Gross per 24 hour   Intake --   Output 300 ml   Net -300 ml       Laboratory  Recent Labs     08/11/22  2336   WBC 14.0*   RBC 5.00   HEMOGLOBIN 15.0   HEMATOCRIT 44.7   MCV 89.4   MCH 30.0   MCHC 33.6*   RDW 53.5*   PLATELETCT 404   MPV 9.1     Recent Labs     08/11/22  2336   SODIUM 133*   POTASSIUM 4.3   CHLORIDE 96   CO2 21   GLUCOSE 118*   BUN 19   CREATININE 1.70*   CALCIUM 9.3     Recent Labs     08/12/22  0550   INR 1.16*               Imaging  CTA ABDOMEN PELVIS W & W/O POST PROCESS   Final Result      1.  No aortic aneurysm or dissection. No significant atherosclerotic changes or branch artery stenoses.   2.  Decreased/nonenhancement of the posterior inferior aspect of the right kidney with perinephric fatty stranding most consistent with renal infarction. No significant  atherosclerotic changes of the right renal artery origin. Small filling defect in the    distal vessel supplying this area could represent a focal dissection or embolus.   3.  Recommend echo for evaluation of central embolic source.   4.  There is retained contrast in areas of the bilateral kidneys on precontrast imaging from prior CT concerning for some degree of renal dysfunction.   5.  Trace left pleural effusion and patchy peripheral left base consolidation again concerning for Covid pneumonia.      DX-CHEST-PORTABLE (1 VIEW)   Final Result         1.  No acute cardiopulmonary disease.   2.  Cardiomegaly      CT-ABDOMEN-PELVIS WITH   Final Result         1.  Irregular areas of enhancement of the right kidney with hazy peripheral fat stranding, appearance suggests changes of bilateral nephritis, correlate with urinalysis. Given somewhat wedge-shaped morphology appearance could represent small areas of    renal infarction.   2.  Peripheral patchy opacity in the left lung base, appearance compatible with infiltrates, consider Covid infiltrates as clinically appropriate.   3.  Small fat-containing right inguinal hernia   4.  Trace left pleural effusion      EC-ECHOCARDIOGRAM COMPLETE W/O CONT    (Results Pending)        Assessment/Plan  Elevated lipase  Assessment & Plan  Lipase 107.?  Mild pancreatitis  History of alcohol dependence in remission, denies drinking alcohol in the last month  No evidence of biliary stone/cholecystitis, CBD is nondilated, pancreas is normal on CT  Pepcid, sucralfate, Zofran as needed  Clear liquid diet as tolerated    History of chronic CHF  Assessment & Plan  Does not appear to be in exacerbation  According to the patient, lower extremity edema gone down significantly in the last months.  Has been on Lasix.  Monitor volume status closely while giving bolus of crystalloids  Will hold Lasix and losartan due to acute kidney injury    Acute kidney injury (HCC)  Assessment & Plan  Patient  has been on Lasix at home.  Denies taking NSAID  CT of the abdomen and pelvis as noted with evidence of bilateral nephritis and renal function on the right side  UA showed mild proteinuria, no sediment  Plan: Obtain bladder scan to rule out urine obstruction and Hurt if needed, urine electrolytes  Bolus of 750 cc of LR ordered  Will hold Lasix and losartan    Obesity  Assessment & Plan  BMI 43  Follow-up with PCP for outpatient management    UTI (urinary tract infection)  Assessment & Plan  IV ceftriaxone  Follow-up with urine culture    CN (constipation)  Assessment & Plan  Chills abdomen pelvis does not show obstruction of the intestine  Ordered MiraLAX twice a day  Consider Fleet enema    Kidney infarction (HCC)  Assessment & Plan  CT of the abdomen pelvis with contrast small area of renal infarction on the right side, correlating with right flank pain for 1 week   Plan: Obtain EKG and monitor on telemetry for A. Fib.  Transthoracic echo to evaluate for intracardiac thrombi.  Ordered follow-up CTA of the abdomen to evaluate for obstruction or dissection  Afterwards consider Thrombophilia work-up, vascular surgery consult and starting systemic anticoagulation if etiology of infarction established (thromboembolism versus dissection of the artery)      Pneumonia  Assessment & Plan  CT of the abdomen pelvis showed peripheral opacities in the left lung base, trace pleural effusion  Patient reports dry cough last couple weeks  COVID-19/influenza/RSV negative  He will be covered with ceftriaxone  RT protocol, supplemental oxygen as needed.  Wean off as tolerated    Sepsis (HCC)  Assessment & Plan  This is Sepsis Present on admission  SIRS criteria identified on my evaluation include: Tachycardia, with heart rate greater than 90 BPM and Leukocytosis, with WBC greater than 12,000  Source is UTI versus renal infarction versus pneumonia  Sepsis protocol initiated  Fluid resuscitation ordered per protocol  Crystalloid Fluid  Administration: Patient has advanced or end-stage heart failure (with documentation of NYHA class III or symptoms with minimal exertion, Or NYHA class IV or symptoms at rest or with activity), for this/these reason(s) it is unsafe for this patient to receive 30 mL/kg of fluid  Partial Fluid Dose Given: 750 cc, patient to be reassessed shortly after completion of partial fluid bolus to ensure adequacy of resuscitation  IV antibiotics as appropriate for source of sepsis  Reassessment: I have reassessed the patient's hemodynamic status               VTE prophylaxis: SCDs/TEDs and enoxaparin ppx    I have performed a physical exam and reviewed and updated ROS and Plan today (8/12/2022). In review of yesterday's note (8/11/2022), there are no changes except as documented above.

## 2022-08-12 NOTE — ED PROVIDER NOTES
"ER Provider Note     Scribed for Gabriel Stout M.D. by [facility] Minor. 8/12/2022, 12:50 AM.    Primary Care Provider: Pcp Pt States None  Means of Arrival: Walk-in   History obtained from: Patient  History limited by: None     CHIEF COMPLAINT  Chief Complaint   Patient presents with    Constipation     Has not had a bowel movement for over a week, took stool softeners, ducalax and Mg Citrate today without relief    Abdominal Pain       HPI  Oracio Jurado is a 41 y.o. male who presents to the Emergency Department for abdominal pain onset approximately two weeks ago. Per patient, he has not passed any bowel movements. Today in the morning, the patient was able to pass a little bit of gas, however. Denies any other pain. No alleviating or exacerbating factors reported. The patient notes he has a medical history of heart failure which onset due to him not taking care of his hypertension.     REVIEW OF SYSTEMS  See HPI for further details. All other systems are negative.     PAST MEDICAL HISTORY   None noted.    SURGICAL HISTORY  patient denies any surgical history    SOCIAL HISTORY  Social History     Tobacco Use    Smoking status: Never    Smokeless tobacco: Never   Vaping Use    Vaping Use: Never used   Substance Use Topics    Alcohol use: Not Currently    Drug use: Not Currently      Social History     Substance and Sexual Activity   Drug Use Not Currently       FAMILY HISTORY  History reviewed. No pertinent family history.    CURRENT MEDICATIONS  No current outpatient medications    ALLERGIES  No Known Allergies    PHYSICAL EXAM  VITAL SIGNS: /83   Pulse (!) 125   Temp 37 °C (98.6 °F) (Temporal)   Resp 18   Ht 1.803 m (5' 11\")   Wt (!) 140 kg (309 lb 1.4 oz)   SpO2 95%   BMI 43.11 kg/m²      Constitutional: Alert in no apparent distress.  HENT: No signs of trauma, Bilateral external ears normal, Nose normal.   Eyes: Pupils are equal and reactive, Conjunctiva normal, Non-icteric.   Neck: Normal " range of motion, No tenderness, Supple, No stridor.   Lymphatic: No lymphadenopathy noted.   Cardiovascular: Regular rate and rhythm, no palpable thrill  Thorax & Lungs: No respiratory distress,  No chest tenderness.  CTAB  Abdomen: Distended, Tenderness throughout, Bowel sounds normal, Soft, No masses, No pulsatile masses. No peritoneal signs.  Skin: Warm, Dry, No erythema, No rash.   Back: No bony tenderness, No CVA tenderness.   Extremities: Intact distal pulses, No edema, No tenderness, No cyanosis.  Musculoskeletal: Good range of motion in all major joints. No tenderness to palpation or major deformities noted.   Neurologic: Alert , Normal motor function, Normal sensory function, No focal deficits noted.   Psychiatric: Affect normal, Judgment normal, Mood normal.     LABS  Labs Reviewed   CBC WITH DIFFERENTIAL - Abnormal; Notable for the following components:       Result Value    WBC 14.0 (*)     MCHC 33.6 (*)     RDW 53.5 (*)     Neutrophils-Polys 74.10 (*)     Lymphocytes 13.60 (*)     Neutrophils (Absolute) 10.40 (*)     Monos (Absolute) 1.44 (*)     All other components within normal limits   COMP METABOLIC PANEL - Abnormal; Notable for the following components:    Sodium 133 (*)     Glucose 118 (*)     Creatinine 1.70 (*)     ALT(SGPT) 51 (*)     Alkaline Phosphatase 139 (*)     Globulin 4.2 (*)     All other components within normal limits   LIPASE - Abnormal; Notable for the following components:    Lipase 107 (*)     All other components within normal limits   URINALYSIS - Abnormal; Notable for the following components:    Protein 30 (*)     Leukocyte Esterase Trace (*)     All other components within normal limits   ESTIMATED GFR - Abnormal; Notable for the following components:    GFR (CKD-EPI) 50 (*)     All other components within normal limits   URINE MICROSCOPIC (W/UA) - Abnormal; Notable for the following components:    WBC 5-10 (*)     RBC 0-2 (*)     All other components within normal limits    LDH - Abnormal; Notable for the following components:    LDH Total 831 (*)     All other components within normal limits   COV-2, FLU A/B, AND RSV BY PCR (CEPHEID)   PROTHROMBIN TIME   LACTIC ACID   LACTIC ACID   BLOOD CULTURE   BLOOD CULTURE   PROCALCITONIN   URINE CULTURE(NEW)   URINE SODIUM RANDOM   URINE CREATININE RANDOM     All labs reviewed by me.    RADIOLOGY  DX-CHEST-PORTABLE (1 VIEW)   Final Result         1.  No acute cardiopulmonary disease.   2.  Cardiomegaly      CT-ABDOMEN-PELVIS WITH   Final Result         1.  Irregular areas of enhancement of the right kidney with hazy peripheral fat stranding, appearance suggests changes of bilateral nephritis, correlate with urinalysis. Given somewhat wedge-shaped morphology appearance could represent small areas of    renal infarction.   2.  Peripheral patchy opacity in the left lung base, appearance compatible with infiltrates, consider Covid infiltrates as clinically appropriate.   3.  Small fat-containing right inguinal hernia   4.  Trace left pleural effusion      CTA ABDOMEN PELVIS W & W/O POST PROCESS    (Results Pending)      The radiologist's interpretation of all radiological studies have been reviewed by me.    COURSE & MEDICAL DECISION MAKING  Pertinent Labs & Imaging studies reviewed. (See chart for details)    This is a 41 y.o. male that presents to the Emergency Department for abdominal pain onset approximately two weeks ago..  We will evaluate the patient for obstruction as well as pancreatitis and hepatitis.  We will also evaluate for urinary tract infection and reassess.    12:50 AM - Patient seen and examined at bedside. I informed the patient I will treat him with fluids and order a CT scan to evaluate. I also informed the patient I will evaluate using labs. Patient verbalizes understanding and agreement to this plan of care. Ordered UA, Lipase, CMP, CBC w/ Diff, and CT Abdomen Pelvis With.  Patient will be medicated with Zofran 4 mg via  injection, morphine 4 mg via injection, and NS Infusion 1,000 mL for his symptoms.     2:19 AM - Ordered CXR and COVID Testing, Flu A/B, and RSV to evaluate.     3:32 AM - Treated patient with morphine 4 mg via injection    3:54 AM - Paged hospitalist    4:24 AM - Paged hospitalist again.     The patient has what is concerning for a renal infarct versus infection.  There is no infection in the patient's urine.  Patient will be admitted for his MATTHIAS.  Patient is a low sodium 133 and a white count.  X-ray of the chest is negative.  We will admit the patient in guarded condition.    4:30 AM - Patient was reevaluated at bedside. Discussed lab and radiology results with the patient and informed them of plans for admission. The patient had the opportunity to ask any questions. The plan for hospitalization was discussed with the patient given their current presentation and diagnostic study results. Patient is understanding and agreeable to the plan for hospitalization.        4:54 AM - I discussed the patient's case and the above findings with Dr. Noonan (Hospitalist) who agreed to evaluate patient for hospitalization.     HYDRATION: Based on the patient's presentation of Dehydration the patient was given IV fluids. IV Hydration was used because oral hydration was not adequate alone. Upon recheck following hydration, the patient was improved.    CRITICAL CARE  The very real possibilty of a deterioration of this patient's condition required the highest level of my preparedness for sudden, emergent intervention.  I provided critical care services, which included medication orders, frequent reevaluations of the patient's condition and response to treatment, ordering and reviewing test results, and discussing the case with various consultants.  The critical care time associated with the care of the patient was 45 minutes. Review chart for interventions. This time is exclusive of any other billable procedures.      DISPOSITION:  Patient will be hospitalized by Dr. Noonan in critical condition.      FINAL IMPRESSION  1. Pain of upper abdomen    2. MATTHIAS (acute kidney injury) (HCC)     3. Critical Care time of 45 minutes.     [facility] MORALES (Paty), am scribing for, and in the presence of, Gabrile Stout M.D..    Electronically signed by: [facility] Minor (Paty), 8/12/2022    IGabriel M.D. personally performed the services described in this documentation, as scribed by [facility] Minor in my presence, and it is both accurate and complete. C    The note accurately reflects work and decisions made by me.  Gabriel Stout M.D.  8/12/2022  6:15 AM

## 2022-08-12 NOTE — ASSESSMENT & PLAN NOTE
Chills abdomen pelvis does not show obstruction of the intestine  Ordered MiraLAX twice a day  Consider Fleet enema

## 2022-08-12 NOTE — ED TRIAGE NOTES
Chief Complaint   Patient presents with    Constipation     Has not had a bowel movement for over a week, took stool softeners, ducalax and Mg Citrate today without relief    Abdominal Pain        Pt reports abdominal pain that makes it difficult to ambulate, pt not able to pass gas, had episode of vomiting 5 days ago, decreased appetite, unable to eat much      Pt ambulatory to triage for above complaint.       Pt is alert/oriented and follows commands. Pt speaking in full sentences and responds appropriately to questions. Some distress noted in triage due to fiona,  respirations are even and unlabored.      Pt placed in lobby and educated on triage process. Pt encouraged to alert staff for any changes in condition

## 2022-08-12 NOTE — ASSESSMENT & PLAN NOTE
Lipase 107.?  Mild pancreatitis  History of alcohol dependence in remission, denies drinking alcohol in the last month  No evidence of biliary stone/cholecystitis, CBD is nondilated, pancreas is normal on CT  Pepcid, sucralfate, Zofran as needed  Clear liquid diet as tolerated

## 2022-08-13 LAB
ALBUMIN SERPL BCP-MCNC: 3.1 G/DL (ref 3.2–4.9)
ALBUMIN/GLOB SERPL: 0.8 G/DL
ALP SERPL-CCNC: 122 U/L (ref 30–99)
ALT SERPL-CCNC: 32 U/L (ref 2–50)
ANION GAP SERPL CALC-SCNC: 12 MMOL/L (ref 7–16)
AST SERPL-CCNC: 23 U/L (ref 12–45)
BASOPHILS # BLD AUTO: 0.5 % (ref 0–1.8)
BASOPHILS # BLD: 0.06 K/UL (ref 0–0.12)
BILIRUB SERPL-MCNC: 0.7 MG/DL (ref 0.1–1.5)
BUN SERPL-MCNC: 17 MG/DL (ref 8–22)
CALCIUM SERPL-MCNC: 8.7 MG/DL (ref 8.5–10.5)
CHLORIDE SERPL-SCNC: 98 MMOL/L (ref 96–112)
CO2 SERPL-SCNC: 24 MMOL/L (ref 20–33)
CREAT SERPL-MCNC: 1.58 MG/DL (ref 0.5–1.4)
EOSINOPHIL # BLD AUTO: 0.18 K/UL (ref 0–0.51)
EOSINOPHIL NFR BLD: 1.6 % (ref 0–6.9)
ERYTHROCYTE [DISTWIDTH] IN BLOOD BY AUTOMATED COUNT: 55.7 FL (ref 35.9–50)
FERRITIN SERPL-MCNC: 1434 NG/ML (ref 22–322)
GFR SERPLBLD CREATININE-BSD FMLA CKD-EPI: 55 ML/MIN/1.73 M 2
GLOBULIN SER CALC-MCNC: 3.9 G/DL (ref 1.9–3.5)
GLUCOSE SERPL-MCNC: 98 MG/DL (ref 65–99)
HCT VFR BLD AUTO: 41.4 % (ref 42–52)
HGB BLD-MCNC: 13.7 G/DL (ref 14–18)
IMM GRANULOCYTES # BLD AUTO: 0.07 K/UL (ref 0–0.11)
IMM GRANULOCYTES NFR BLD AUTO: 0.6 % (ref 0–0.9)
IRON SATN MFR SERPL: 10 % (ref 15–55)
IRON SERPL-MCNC: 17 UG/DL (ref 50–180)
LYMPHOCYTES # BLD AUTO: 2.12 K/UL (ref 1–4.8)
LYMPHOCYTES NFR BLD: 18.3 % (ref 22–41)
MCH RBC QN AUTO: 30.4 PG (ref 27–33)
MCHC RBC AUTO-ENTMCNC: 33.1 G/DL (ref 33.7–35.3)
MCV RBC AUTO: 92 FL (ref 81.4–97.8)
MONOCYTES # BLD AUTO: 0.98 K/UL (ref 0–0.85)
MONOCYTES NFR BLD AUTO: 8.4 % (ref 0–13.4)
NEUTROPHILS # BLD AUTO: 8.19 K/UL (ref 1.82–7.42)
NEUTROPHILS NFR BLD: 70.6 % (ref 44–72)
NRBC # BLD AUTO: 0 K/UL
NRBC BLD-RTO: 0 /100 WBC
PLATELET # BLD AUTO: 388 K/UL (ref 164–446)
PMV BLD AUTO: 9.7 FL (ref 9–12.9)
POTASSIUM SERPL-SCNC: 4.7 MMOL/L (ref 3.6–5.5)
PROT SERPL-MCNC: 7 G/DL (ref 6–8.2)
RBC # BLD AUTO: 4.5 M/UL (ref 4.7–6.1)
SODIUM SERPL-SCNC: 134 MMOL/L (ref 135–145)
TIBC SERPL-MCNC: 167 UG/DL (ref 250–450)
UIBC SERPL-MCNC: 150 UG/DL (ref 110–370)
WBC # BLD AUTO: 11.6 K/UL (ref 4.8–10.8)

## 2022-08-13 PROCEDURE — 36415 COLL VENOUS BLD VENIPUNCTURE: CPT

## 2022-08-13 PROCEDURE — 83540 ASSAY OF IRON: CPT

## 2022-08-13 PROCEDURE — 83550 IRON BINDING TEST: CPT

## 2022-08-13 PROCEDURE — 85025 COMPLETE CBC W/AUTO DIFF WBC: CPT

## 2022-08-13 PROCEDURE — A9270 NON-COVERED ITEM OR SERVICE: HCPCS | Performed by: INTERNAL MEDICINE

## 2022-08-13 PROCEDURE — 700102 HCHG RX REV CODE 250 W/ 637 OVERRIDE(OP): Performed by: INTERNAL MEDICINE

## 2022-08-13 PROCEDURE — 80053 COMPREHEN METABOLIC PANEL: CPT

## 2022-08-13 PROCEDURE — 700105 HCHG RX REV CODE 258: Performed by: INTERNAL MEDICINE

## 2022-08-13 PROCEDURE — 700111 HCHG RX REV CODE 636 W/ 250 OVERRIDE (IP): Performed by: INTERNAL MEDICINE

## 2022-08-13 PROCEDURE — 770020 HCHG ROOM/CARE - TELE (206)

## 2022-08-13 PROCEDURE — 700101 HCHG RX REV CODE 250: Performed by: INTERNAL MEDICINE

## 2022-08-13 PROCEDURE — 99233 SBSQ HOSP IP/OBS HIGH 50: CPT | Performed by: HOSPITALIST

## 2022-08-13 PROCEDURE — 94669 MECHANICAL CHEST WALL OSCILL: CPT

## 2022-08-13 PROCEDURE — 82728 ASSAY OF FERRITIN: CPT

## 2022-08-13 PROCEDURE — 94760 N-INVAS EAR/PLS OXIMETRY 1: CPT

## 2022-08-13 RX ADMIN — FAMOTIDINE 20 MG: 20 TABLET, FILM COATED ORAL at 05:25

## 2022-08-13 RX ADMIN — METOPROLOL TARTRATE 12.5 MG: 25 TABLET, FILM COATED ORAL at 05:26

## 2022-08-13 RX ADMIN — SUCRALFATE 1 G: 1 TABLET ORAL at 11:19

## 2022-08-13 RX ADMIN — SUCRALFATE 1 G: 1 TABLET ORAL at 05:25

## 2022-08-13 RX ADMIN — SENNOSIDES AND DOCUSATE SODIUM 2 TABLET: 50; 8.6 TABLET ORAL at 05:25

## 2022-08-13 RX ADMIN — FAMOTIDINE 20 MG: 20 TABLET, FILM COATED ORAL at 16:59

## 2022-08-13 RX ADMIN — SUCRALFATE 1 G: 1 TABLET ORAL at 16:59

## 2022-08-13 RX ADMIN — APIXABAN 10 MG: 5 TABLET, FILM COATED ORAL at 05:25

## 2022-08-13 RX ADMIN — POLYETHYLENE GLYCOL 3350 1 PACKET: 17 POWDER, FOR SOLUTION ORAL at 17:00

## 2022-08-13 RX ADMIN — SENNOSIDES AND DOCUSATE SODIUM 2 TABLET: 50; 8.6 TABLET ORAL at 17:00

## 2022-08-13 RX ADMIN — CEFTRIAXONE SODIUM 2 G: 10 INJECTION, POWDER, FOR SOLUTION INTRAVENOUS at 05:25

## 2022-08-13 RX ADMIN — SUCRALFATE 1 G: 1 TABLET ORAL at 23:38

## 2022-08-13 RX ADMIN — SODIUM CHLORIDE: 9 INJECTION, SOLUTION INTRAVENOUS at 11:09

## 2022-08-13 RX ADMIN — APIXABAN 10 MG: 5 TABLET, FILM COATED ORAL at 16:59

## 2022-08-13 RX ADMIN — METOPROLOL TARTRATE 12.5 MG: 25 TABLET, FILM COATED ORAL at 17:00

## 2022-08-13 RX ADMIN — SODIUM CHLORIDE: 9 INJECTION, SOLUTION INTRAVENOUS at 18:52

## 2022-08-13 ASSESSMENT — COPD QUESTIONNAIRES
HAVE YOU SMOKED AT LEAST 100 CIGARETTES IN YOUR ENTIRE LIFE: NO/DON'T KNOW
DURING THE PAST 4 WEEKS HOW MUCH DID YOU FEEL SHORT OF BREATH: NONE/LITTLE OF THE TIME
COPD SCREENING SCORE: 0
DO YOU EVER COUGH UP ANY MUCUS OR PHLEGM?: NO/ONLY WITH OCCASIONAL COLDS OR INFECTIONS

## 2022-08-13 ASSESSMENT — PAIN DESCRIPTION - PAIN TYPE: TYPE: ACUTE PAIN

## 2022-08-13 NOTE — CARE PLAN
Problem: Pain - Standard  Goal: Alleviation of pain or a reduction in pain to the patient’s comfort goal  Outcome: Progressing     Problem: Knowledge Deficit - Standard  Goal: Patient and family/care givers will demonstrate understanding of plan of care, disease process/condition, diagnostic tests and medications  Outcome: Progressing     Problem: Hemodynamics  Goal: Patient's hemodynamics, fluid balance and neurologic status will be stable or improve  Outcome: Progressing     Problem: Respiratory  Goal: Patient will achieve/maintain optimum respiratory ventilation and gas exchange  Outcome: Progressing   The patient is Stable - Low risk of patient condition declining or worsening    Shift Goals  Clinical Goals: monitor labs  Patient Goals: Poop    Progress made toward(s) clinical / shift goals:  Progressing    Patient is not progressing towards the following goals:

## 2022-08-13 NOTE — PROGRESS NOTES
Bedside report received from day RN, pt care assumed, assessment completed. Pt is A&O4, pain 0/10, SR on the monitor. Updated on POC, questions answered. Bed in lowest, locked position, treaded socks on, call light and belongings within reach. Fall precautions in place.

## 2022-08-13 NOTE — PROGRESS NOTES
Assumed care of patient, received bedside report from NOC RN. Patient is A&O X 4. Pain 0/10. Vital signs stable overnight, on RA. On tele monitor, SR 96. POC discussed with patient. Patient verbalized understanding. All questions answered. Call light within reach and fall precautions in place. Bed locked and in lowest position.

## 2022-08-13 NOTE — PROGRESS NOTES
Monitor Summary:   Rhythm: ST  Rate: 102-116  Measurement: .17/.07/.32  Ectopy: Rare PVC's

## 2022-08-13 NOTE — CARE PLAN
The patient is Watcher - Medium risk of patient condition declining or worsening    Shift Goals  Clinical Goals: Have a BM and monitor labs  Patient Goals: Poop and leave the hospital    Progress made toward(s) clinical / shift goals:    Problem: Pain - Standard  Goal: Alleviation of pain or a reduction in pain to the patient’s comfort goal  Outcome: Progressing     Problem: Hemodynamics  Goal: Patient's hemodynamics, fluid balance and neurologic status will be stable or improve  Outcome: Progressing

## 2022-08-14 ENCOUNTER — APPOINTMENT (OUTPATIENT)
Dept: CARDIOLOGY | Facility: MEDICAL CENTER | Age: 42
DRG: 871 | End: 2022-08-14
Attending: HOSPITALIST
Payer: COMMERCIAL

## 2022-08-14 LAB
ANION GAP SERPL CALC-SCNC: 10 MMOL/L (ref 7–16)
BACTERIA UR CULT: NORMAL
BUN SERPL-MCNC: 17 MG/DL (ref 8–22)
CALCIUM SERPL-MCNC: 8.5 MG/DL (ref 8.5–10.5)
CHLORIDE SERPL-SCNC: 99 MMOL/L (ref 96–112)
CO2 SERPL-SCNC: 24 MMOL/L (ref 20–33)
CREAT SERPL-MCNC: 1.4 MG/DL (ref 0.5–1.4)
ERYTHROCYTE [DISTWIDTH] IN BLOOD BY AUTOMATED COUNT: 53.8 FL (ref 35.9–50)
GFR SERPLBLD CREATININE-BSD FMLA CKD-EPI: 64 ML/MIN/1.73 M 2
GLUCOSE SERPL-MCNC: 112 MG/DL (ref 65–99)
HCT VFR BLD AUTO: 39.8 % (ref 42–52)
HGB BLD-MCNC: 13.2 G/DL (ref 14–18)
LV EJECT FRACT  99904: 28
LV EJECT FRACT MOD 2C 99903: 26.95
LV EJECT FRACT MOD 4C 99902: 31.17
LV EJECT FRACT MOD BP 99901: 27.46
MAGNESIUM SERPL-MCNC: 2.2 MG/DL (ref 1.5–2.5)
MCH RBC QN AUTO: 30 PG (ref 27–33)
MCHC RBC AUTO-ENTMCNC: 33.2 G/DL (ref 33.7–35.3)
MCV RBC AUTO: 90.5 FL (ref 81.4–97.8)
PLATELET # BLD AUTO: 390 K/UL (ref 164–446)
PMV BLD AUTO: 9.6 FL (ref 9–12.9)
POTASSIUM SERPL-SCNC: 4.4 MMOL/L (ref 3.6–5.5)
RBC # BLD AUTO: 4.4 M/UL (ref 4.7–6.1)
SIGNIFICANT IND 70042: NORMAL
SITE SITE: NORMAL
SODIUM SERPL-SCNC: 133 MMOL/L (ref 135–145)
SOURCE SOURCE: NORMAL
VIT B12 SERPL-MCNC: 902 PG/ML (ref 211–911)
WBC # BLD AUTO: 7.2 K/UL (ref 4.8–10.8)

## 2022-08-14 PROCEDURE — 80048 BASIC METABOLIC PNL TOTAL CA: CPT

## 2022-08-14 PROCEDURE — 85027 COMPLETE CBC AUTOMATED: CPT

## 2022-08-14 PROCEDURE — 82607 VITAMIN B-12: CPT

## 2022-08-14 PROCEDURE — 93306 TTE W/DOPPLER COMPLETE: CPT | Mod: 26 | Performed by: INTERNAL MEDICINE

## 2022-08-14 PROCEDURE — 93306 TTE W/DOPPLER COMPLETE: CPT

## 2022-08-14 PROCEDURE — 700102 HCHG RX REV CODE 250 W/ 637 OVERRIDE(OP): Performed by: INTERNAL MEDICINE

## 2022-08-14 PROCEDURE — 700111 HCHG RX REV CODE 636 W/ 250 OVERRIDE (IP): Performed by: INTERNAL MEDICINE

## 2022-08-14 PROCEDURE — 83735 ASSAY OF MAGNESIUM: CPT

## 2022-08-14 PROCEDURE — A9270 NON-COVERED ITEM OR SERVICE: HCPCS | Performed by: INTERNAL MEDICINE

## 2022-08-14 PROCEDURE — 94669 MECHANICAL CHEST WALL OSCILL: CPT

## 2022-08-14 PROCEDURE — 700105 HCHG RX REV CODE 258: Performed by: INTERNAL MEDICINE

## 2022-08-14 PROCEDURE — 700101 HCHG RX REV CODE 250: Performed by: INTERNAL MEDICINE

## 2022-08-14 PROCEDURE — 700117 HCHG RX CONTRAST REV CODE 255: Performed by: INTERNAL MEDICINE

## 2022-08-14 PROCEDURE — 770020 HCHG ROOM/CARE - TELE (206)

## 2022-08-14 PROCEDURE — 99232 SBSQ HOSP IP/OBS MODERATE 35: CPT | Performed by: HOSPITALIST

## 2022-08-14 RX ADMIN — HUMAN ALBUMIN MICROSPHERES AND PERFLUTREN 3 ML: 10; .22 INJECTION, SOLUTION INTRAVENOUS at 15:50

## 2022-08-14 RX ADMIN — SUCRALFATE 1 G: 1 TABLET ORAL at 12:26

## 2022-08-14 RX ADMIN — FAMOTIDINE 20 MG: 20 TABLET, FILM COATED ORAL at 17:01

## 2022-08-14 RX ADMIN — OXYCODONE 5 MG: 5 TABLET ORAL at 21:09

## 2022-08-14 RX ADMIN — POLYETHYLENE GLYCOL 3350 1 PACKET: 17 POWDER, FOR SOLUTION ORAL at 05:10

## 2022-08-14 RX ADMIN — SENNOSIDES AND DOCUSATE SODIUM 2 TABLET: 50; 8.6 TABLET ORAL at 05:11

## 2022-08-14 RX ADMIN — METOPROLOL TARTRATE 12.5 MG: 25 TABLET, FILM COATED ORAL at 17:01

## 2022-08-14 RX ADMIN — SODIUM CHLORIDE: 9 INJECTION, SOLUTION INTRAVENOUS at 12:27

## 2022-08-14 RX ADMIN — CEFTRIAXONE SODIUM 2 G: 10 INJECTION, POWDER, FOR SOLUTION INTRAVENOUS at 05:11

## 2022-08-14 RX ADMIN — FAMOTIDINE 20 MG: 20 TABLET, FILM COATED ORAL at 05:11

## 2022-08-14 RX ADMIN — SUCRALFATE 1 G: 1 TABLET ORAL at 05:11

## 2022-08-14 RX ADMIN — METOPROLOL TARTRATE 12.5 MG: 25 TABLET, FILM COATED ORAL at 05:11

## 2022-08-14 RX ADMIN — SODIUM CHLORIDE: 9 INJECTION, SOLUTION INTRAVENOUS at 21:52

## 2022-08-14 RX ADMIN — MAGNESIUM HYDROXIDE 30 ML: 400 SUSPENSION ORAL at 21:10

## 2022-08-14 RX ADMIN — APIXABAN 10 MG: 5 TABLET, FILM COATED ORAL at 17:01

## 2022-08-14 RX ADMIN — APIXABAN 10 MG: 5 TABLET, FILM COATED ORAL at 05:11

## 2022-08-14 ASSESSMENT — PAIN DESCRIPTION - PAIN TYPE: TYPE: ACUTE PAIN

## 2022-08-14 NOTE — PROGRESS NOTES
[facility] Medicine Daily Progress Note    Date of Service  8/14/2022    Chief Complaint  Oracio Jurado is a 41 y.o. male admitted 8/12/2022 with Abd Pain    Hospital Course  No notes on file    Interval Problem Update  No acute overnight events, patient reports feeling better from admission.  Was admitted for abdominal pain, suspected to be related to renal infarct seen on CT imaging.  Source of infarct is unclear, awaiting transthoracic echocardiogram, on empiric anticoagulation at this time.    Consultants/Specialty  None at this time    Code Status  Full Code    Disposition  Pending    Review of Systems  All systems reviewed and negative except as noted per above.    Physical Exam  Temp:  [35.8 °C (96.4 °F)-36.7 °C (98.1 °F)] 35.8 °C (96.4 °F)  Pulse:  [] 90  Resp:  [16-20] 18  BP: (119-149)/() 149/109  SpO2:  [90 %-95 %] 90 %    General: No acute distress  HEENT atraumatic, normocephalic, pupils equal round reactive to light  Neck: No JVD  Chest: Respirations are unlabored  Cardiac: Physiologic S1 and S2  Abdomen: Soft, nontender, nondistended  Extremities: Without clubbing, cyanosis or edema  Neuro: Cranial nerves II through XII are grossly intact.  Psych: No anxiety, judgement intact.        Current Facility-Administered Medications:     Respiratory Therapy Consult, , Nebulization, Continuous RT, Cornell Noonan M.D.    ondansetron (ZOFRAN) syringe/vial injection 4 mg, 4 mg, Intravenous, Q4HRS PRN, Cornell Noonan M.D., 4 mg at 08/12/22 0634    ondansetron (ZOFRAN ODT) dispertab 4 mg, 4 mg, Oral, Q4HRS PRN, Cornell Noonan M.D.    promethazine (PHENERGAN) tablet 12.5-25 mg, 12.5-25 mg, Oral, Q4HRS PRN, Cornell Noonan M.D.    promethazine (PHENERGAN) suppository 12.5-25 mg, 12.5-25 mg, Rectal, Q4HRS PRN, Cornell Noonan M.D.    prochlorperazine (COMPAZINE) injection 5-10 mg, 5-10 mg, Intravenous, Q4HRS PRN, Cornell Noonan M.D.    labetalol (NORMODYNE/TRANDATE) injection 10 mg, 10 mg,  Intravenous, Q4HRS PRN, Cornell Noonan M.D.    acetaminophen (Tylenol) tablet 650 mg, 650 mg, Oral, Q6HRS PRN, Cornell Noonan M.D.    Notify provider if pain remains uncontrolled, , , CONTINUOUS **AND** Use the Numeric Rating Scale (NRS), Schulz-Baker Faces (WBF), or FLACC on regular floors and Critical-Care Pain Observation Tool (CPOT) on ICUs/Trauma to assess pain, , , CONTINUOUS **AND** Pulse Ox, , , CONTINUOUS **AND** Pharmacy Consult Request ...Pain Management Review 1 Each, 1 Each, Other, PHARMACY TO DOSE **AND** If patient difficult to arouse and/or has respiratory depression (respiratory rate of 10 or less), stop any opiates that are currently infusing and call a Rapid Response., , , CONTINUOUS, Cornell Noonan M.D.    oxyCODONE immediate-release (ROXICODONE) tablet 2.5 mg, 2.5 mg, Oral, Q3HRS PRN **OR** oxyCODONE immediate-release (ROXICODONE) tablet 5 mg, 5 mg, Oral, Q3HRS PRN **OR** HYDROmorphone (Dilaudid) injection 0.25 mg, 0.25 mg, Intravenous, Q3HRS PRN, Cornell Noonan M.D.    famotidine (PEPCID) tablet 20 mg, 20 mg, Oral, BID, 20 mg at 08/14/22 0511 **OR** [DISCONTINUED] famotidine (PEPCID) injection 20 mg, 20 mg, Intravenous, BID, Cornell Noonan M.D.    polyethylene glycol/lytes (MIRALAX) PACKET 1 Packet, 1 Packet, Oral, BID, Cornell Noonan M.D., 1 Packet at 08/13/22 1700    guaiFENesin dextromethorphan (ROBITUSSIN DM) 100-10 MG/5ML syrup 10 mL, 10 mL, Oral, Q6HRS PRN, Cornell Noonan M.D.    Pharmacy Consult Request - to monitor for nephrotoxic agents, 1 Each, Other, PHARMACY TO DOSE, Cornell Noonan M.D.    cefTRIAXone (Rocephin) syringe 2 g, 2 g, Intravenous, Q24HRS, Cornell Noonan M.D., 2 g at 08/14/22 0511    metoprolol tartrate (LOPRESSOR) tablet 12.5 mg, 12.5 mg, Oral, BID, Cornell Noonan M.D., 12.5 mg at 08/14/22 0511    NS infusion, , Intravenous, Continuous, Leeanna Porter M.D., Last Rate: 125 mL/hr at 08/14/22 1227, New Bag at 08/14/22 1227    senna-docusate  (PERICOLACE or SENOKOT S) 8.6-50 MG per tablet 2 Tablet, 2 Tablet, Oral, BID, 2 Tablet at 08/14/22 0511 **AND** polyethylene glycol/lytes (MIRALAX) PACKET 1 Packet, 1 Packet, Oral, BID, 1 Packet at 08/14/22 0510 **AND** magnesium hydroxide (MILK OF MAGNESIA) suspension 30 mL, 30 mL, Oral, QDAY PRN **AND** bisacodyl (DULCOLAX) suppository 10 mg, 10 mg, Rectal, QDAY PRN, Leeanna Porter M.D.    apixaban (ELIQUIS) tablet 10 mg, 10 mg, Oral, BID, 10 mg at 08/14/22 0511 **FOLLOWED BY** [START ON 8/19/2022] apixaban (ELIQUIS) tablet 5 mg, 5 mg, Oral, BID, Leeanna Porter M.D.        Fluids    Intake/Output Summary (Last 24 hours) at 8/14/2022 1348  Last data filed at 8/14/2022 0934  Gross per 24 hour   Intake 240 ml   Output 2550 ml   Net -2310 ml         Laboratory  Recent Labs     08/11/22  2336 08/13/22  0431 08/14/22  0150   WBC 14.0* 11.6* 7.2   RBC 5.00 4.50* 4.40*   HEMOGLOBIN 15.0 13.7* 13.2*   HEMATOCRIT 44.7 41.4* 39.8*   MCV 89.4 92.0 90.5   MCH 30.0 30.4 30.0   MCHC 33.6* 33.1* 33.2*   RDW 53.5* 55.7* 53.8*   PLATELETCT 404 388 390   MPV 9.1 9.7 9.6       Recent Labs     08/11/22  2336 08/13/22  0431 08/14/22  0150   SODIUM 133* 134* 133*   POTASSIUM 4.3 4.7 4.4   CHLORIDE 96 98 99   CO2 21 24 24   GLUCOSE 118* 98 112*   BUN 19 17 17   CREATININE 1.70* 1.58* 1.40   CALCIUM 9.3 8.7 8.5       Recent Labs     08/12/22  0550 08/12/22  1115   APTT  --  26.7   INR 1.16*  --                  Imaging  CTA ABDOMEN PELVIS W & W/O POST PROCESS   Final Result      1.  No aortic aneurysm or dissection. No significant atherosclerotic changes or branch artery stenoses.   2.  Decreased/nonenhancement of the posterior inferior aspect of the right kidney with perinephric fatty stranding most consistent with renal infarction. No significant atherosclerotic changes of the right renal artery origin. Small filling defect in the    distal vessel supplying this area could represent a focal dissection or embolus.   3.  Recommend  echo for evaluation of central embolic source.   4.  There is retained contrast in areas of the bilateral kidneys on precontrast imaging from prior CT concerning for some degree of renal dysfunction.   5.  Trace left pleural effusion and patchy peripheral left base consolidation again concerning for Covid pneumonia.      DX-CHEST-PORTABLE (1 VIEW)   Final Result         1.  No acute cardiopulmonary disease.   2.  Cardiomegaly      CT-ABDOMEN-PELVIS WITH   Final Result         1.  Irregular areas of enhancement of the right kidney with hazy peripheral fat stranding, appearance suggests changes of bilateral nephritis, correlate with urinalysis. Given somewhat wedge-shaped morphology appearance could represent small areas of    renal infarction.   2.  Peripheral patchy opacity in the left lung base, appearance compatible with infiltrates, consider Covid infiltrates as clinically appropriate.   3.  Small fat-containing right inguinal hernia   4.  Trace left pleural effusion      EC-ECHOCARDIOGRAM COMPLETE W/O CONT    (Results Pending)          Assessment/Plan  Elevated lipase  Assessment & Plan  Lipase 107.?  Mild pancreatitis  History of alcohol dependence in remission, denies drinking alcohol in the last month  No evidence of biliary stone/cholecystitis, CBD is nondilated, pancreas is normal on CT  Pepcid, sucralfate, Zofran as needed  Clear liquid diet as tolerated    History of chronic CHF  Assessment & Plan  Does not appear to be in exacerbation  According to the patient, lower extremity edema gone down significantly in the last months.  Has been on Lasix.  Monitor volume status closely while giving bolus of crystalloids  Will hold Lasix and losartan due to acute kidney injury    Acute kidney injury (HCC)  Assessment & Plan  Patient has been on Lasix at home.  Denies taking NSAID  CT of the abdomen and pelvis as noted with evidence of bilateral nephritis and renal function on the right side  UA showed mild  proteinuria, no sediment  Plan: Obtain bladder scan to rule out urine obstruction and Hurt if needed, urine electrolytes  Bolus of 750 cc of LR ordered  Will hold Lasix and losartan    Obesity  Assessment & Plan  BMI 43  Follow-up with PCP for outpatient management    UTI (urinary tract infection)  Assessment & Plan  IV ceftriaxone  Follow-up with urine culture    CN (constipation)  Assessment & Plan  Chills abdomen pelvis does not show obstruction of the intestine  Ordered MiraLAX twice a day  Consider Fleet enema    Kidney infarction (HCC)  Assessment & Plan  CT of the abdomen pelvis with contrast small area of renal infarction on the right side, correlating with right flank pain for 1 week   Plan: Obtain EKG and monitor on telemetry for A. Fib.  Transthoracic echo to evaluate for intracardiac thrombi still pending.  CTA per above.    Afterwards consider Thrombophilia work-up, vascular surgery consult and starting systemic anticoagulation if etiology of infarction established (thromboembolism versus dissection of the artery)  - CTA revealed posterior inferior aspect of the right kidney with perinephric fatty stranding consistent with renal infarction  - Admitting MD spoke with Dr. Fagan who did not think this needed surgical intervention  - f/u echo  - will start eliquis for now    Pneumonia  Assessment & Plan  CT of the abdomen pelvis showed peripheral opacities in the left lung base, trace pleural effusion  Patient reports dry cough last couple weeks  COVID-19/influenza/RSV negative  He will be covered with ceftriaxone  RT protocol, supplemental oxygen as needed.  Wean off as tolerated    Sepsis (HCC)  Assessment & Plan  This is Sepsis Present on admission  SIRS criteria identified on my evaluation include: Tachycardia, with heart rate greater than 90 BPM and Leukocytosis, with WBC greater than 12,000  Source is UTI versus renal infarction versus pneumonia  Sepsis protocol initiated  Fluid resuscitation  ordered per protocol  Crystalloid Fluid Administration: Patient has advanced or end-stage heart failure (with documentation of NYHA class III or symptoms with minimal exertion, Or NYHA class IV or symptoms at rest or with activity), for this/these reason(s) it is unsafe for this patient to receive 30 mL/kg of fluid  Partial Fluid Dose Given: 750 cc, patient to be reassessed shortly after completion of partial fluid bolus to ensure adequacy of resuscitation  IV antibiotics as appropriate for source of sepsis  Reassessment: I have reassessed the patient's hemodynamic status

## 2022-08-14 NOTE — CARE PLAN
Problem: Pain - Standard  Goal: Alleviation of pain or a reduction in pain to the patient’s comfort goal  Outcome: Progressing     Problem: Knowledge Deficit - Standard  Goal: Patient and family/care givers will demonstrate understanding of plan of care, disease process/condition, diagnostic tests and medications  Outcome: Progressing   The patient is Stable - Low risk of patient condition declining or worsening    Shift Goals  Clinical Goals: promote sleep  Patient Goals: Poop and leave the hospital    Progress made toward(s) clinical / shift goals:  Fluids      Patient is not progressing towards the following goals:

## 2022-08-14 NOTE — PROGRESS NOTES
Bedside report received from RUFUS Brown. Patient is alert and oriented x  4, room air, SR on the monitor, denies pain. Fall precautions are in place. Call light is in reach.

## 2022-08-14 NOTE — PROGRESS NOTES
Bedside report received. Bed locked and in low position, call light within reach. Hourly rounding in place. No further needs at this time.

## 2022-08-14 NOTE — CARE PLAN
The patient is Stable - Low risk of patient condition declining or worsening    Shift Goals  Clinical Goals: promote sleep  Patient Goals: Poop and leave the hospital    Progress made toward(s) clinical / shift goals:    Problem: Pain - Standard  Goal: Alleviation of pain or a reduction in pain to the patient’s comfort goal  Outcome: Progressing     Problem: Knowledge Deficit - Standard  Goal: Patient and family/care givers will demonstrate understanding of plan of care, disease process/condition, diagnostic tests and medications  Outcome: Progressing     Problem: Urinary - Renal Perfusion  Goal: Ability to achieve and maintain adequate renal perfusion and functioning will improve  Outcome: Progressing       Patient is not progressing towards the following goals:

## 2022-08-15 ENCOUNTER — PHARMACY VISIT (OUTPATIENT)
Dept: PHARMACY | Facility: MEDICAL CENTER | Age: 42
End: 2022-08-15
Payer: COMMERCIAL

## 2022-08-15 VITALS
HEIGHT: 71 IN | OXYGEN SATURATION: 94 % | WEIGHT: 310.63 LBS | BODY MASS INDEX: 43.49 KG/M2 | RESPIRATION RATE: 18 BRPM | SYSTOLIC BLOOD PRESSURE: 152 MMHG | DIASTOLIC BLOOD PRESSURE: 108 MMHG | HEART RATE: 97 BPM | TEMPERATURE: 97.6 F

## 2022-08-15 PROBLEM — J18.9 PNEUMONIA: Status: RESOLVED | Noted: 2022-08-12 | Resolved: 2022-08-15

## 2022-08-15 PROBLEM — R10.9 ABDOMINAL PAIN: Status: RESOLVED | Noted: 2022-08-12 | Resolved: 2022-08-15

## 2022-08-15 PROBLEM — I50.23 CHF (CONGESTIVE HEART FAILURE), NYHA CLASS II, ACUTE ON CHRONIC, SYSTOLIC (HCC): Status: ACTIVE | Noted: 2022-08-15

## 2022-08-15 PROBLEM — N17.9 ACUTE KIDNEY INJURY (HCC): Status: RESOLVED | Noted: 2022-08-12 | Resolved: 2022-08-15

## 2022-08-15 PROBLEM — A41.9 SEPSIS (HCC): Status: RESOLVED | Noted: 2022-08-12 | Resolved: 2022-08-15

## 2022-08-15 PROBLEM — N39.0 UTI (URINARY TRACT INFECTION): Status: RESOLVED | Noted: 2022-08-12 | Resolved: 2022-08-15

## 2022-08-15 PROBLEM — R65.10 SIRS (SYSTEMIC INFLAMMATORY RESPONSE SYNDROME) (HCC): Status: RESOLVED | Noted: 2022-08-12 | Resolved: 2022-08-15

## 2022-08-15 PROBLEM — R74.8 ELEVATED LIPASE: Status: RESOLVED | Noted: 2022-08-12 | Resolved: 2022-08-15

## 2022-08-15 LAB
AT III ACT/NOR PPP CHRO: 95 % (ref 76–128)
PROCALCITONIN SERPL-MCNC: 0.27 NG/ML
PROT C ACT/NOR PPP: 93 % (ref 83–168)
PROT S ACT/NOR PPP: 104 % (ref 66–143)

## 2022-08-15 PROCEDURE — 700101 HCHG RX REV CODE 250: Performed by: INTERNAL MEDICINE

## 2022-08-15 PROCEDURE — 99239 HOSP IP/OBS DSCHRG MGMT >30: CPT | Performed by: HOSPITALIST

## 2022-08-15 PROCEDURE — A9270 NON-COVERED ITEM OR SERVICE: HCPCS | Performed by: INTERNAL MEDICINE

## 2022-08-15 PROCEDURE — 90471 IMMUNIZATION ADMIN: CPT

## 2022-08-15 PROCEDURE — 700102 HCHG RX REV CODE 250 W/ 637 OVERRIDE(OP): Performed by: INTERNAL MEDICINE

## 2022-08-15 PROCEDURE — 700111 HCHG RX REV CODE 636 W/ 250 OVERRIDE (IP): Performed by: INTERNAL MEDICINE

## 2022-08-15 PROCEDURE — 3E0234Z INTRODUCTION OF SERUM, TOXOID AND VACCINE INTO MUSCLE, PERCUTANEOUS APPROACH: ICD-10-PCS | Performed by: HOSPITALIST

## 2022-08-15 PROCEDURE — 700105 HCHG RX REV CODE 258: Performed by: INTERNAL MEDICINE

## 2022-08-15 PROCEDURE — 700111 HCHG RX REV CODE 636 W/ 250 OVERRIDE (IP): Performed by: HOSPITALIST

## 2022-08-15 PROCEDURE — RXMED WILLOW AMBULATORY MEDICATION CHARGE: Performed by: HOSPITALIST

## 2022-08-15 PROCEDURE — 99255 IP/OBS CONSLTJ NEW/EST HI 80: CPT | Mod: GC | Performed by: INTERNAL MEDICINE

## 2022-08-15 PROCEDURE — 84145 PROCALCITONIN (PCT): CPT

## 2022-08-15 PROCEDURE — 90677 PCV20 VACCINE IM: CPT | Performed by: HOSPITALIST

## 2022-08-15 RX ORDER — CARVEDILOL 6.25 MG/1
6.25 TABLET ORAL 2 TIMES DAILY WITH MEALS
Qty: 180 TABLET | Refills: 0 | Status: SHIPPED | OUTPATIENT
Start: 2022-08-15 | End: 2022-08-30 | Stop reason: SDUPTHER

## 2022-08-15 RX ORDER — DAPAGLIFLOZIN 10 MG/1
10 TABLET, FILM COATED ORAL DAILY
Status: DISCONTINUED | OUTPATIENT
Start: 2022-08-15 | End: 2022-08-15 | Stop reason: HOSPADM

## 2022-08-15 RX ORDER — DAPAGLIFLOZIN 10 MG/1
10 TABLET, FILM COATED ORAL DAILY
Qty: 90 TABLET | Refills: 0 | Status: SHIPPED | OUTPATIENT
Start: 2022-08-15 | End: 2022-08-30 | Stop reason: SDUPTHER

## 2022-08-15 RX ORDER — CARVEDILOL 6.25 MG/1
6.25 TABLET ORAL 2 TIMES DAILY WITH MEALS
Status: DISCONTINUED | OUTPATIENT
Start: 2022-08-15 | End: 2022-08-15 | Stop reason: HOSPADM

## 2022-08-15 RX ORDER — AMOXICILLIN AND CLAVULANATE POTASSIUM 875; 125 MG/1; MG/1
1 TABLET, FILM COATED ORAL 2 TIMES DAILY
Qty: 6 TABLET | Refills: 0 | Status: SHIPPED | OUTPATIENT
Start: 2022-08-15 | End: 2022-08-18

## 2022-08-15 RX ADMIN — CARVEDILOL 6.25 MG: 6.25 TABLET, FILM COATED ORAL at 12:32

## 2022-08-15 RX ADMIN — DAPAGLIFLOZIN 10 MG: 10 TABLET, FILM COATED ORAL at 12:31

## 2022-08-15 RX ADMIN — CEFTRIAXONE SODIUM 2 G: 10 INJECTION, POWDER, FOR SOLUTION INTRAVENOUS at 05:34

## 2022-08-15 RX ADMIN — SENNOSIDES AND DOCUSATE SODIUM 2 TABLET: 50; 8.6 TABLET ORAL at 04:23

## 2022-08-15 RX ADMIN — METOPROLOL TARTRATE 12.5 MG: 25 TABLET, FILM COATED ORAL at 04:22

## 2022-08-15 RX ADMIN — LABETALOL HYDROCHLORIDE 10 MG: 5 INJECTION, SOLUTION INTRAVENOUS at 05:44

## 2022-08-15 RX ADMIN — FAMOTIDINE 20 MG: 20 TABLET, FILM COATED ORAL at 04:23

## 2022-08-15 RX ADMIN — POLYETHYLENE GLYCOL 3350 1 PACKET: 17 POWDER, FOR SOLUTION ORAL at 04:24

## 2022-08-15 RX ADMIN — PNEUMOCOCCAL 20-VALENT CONJUGATE VACCINE 0.5 ML
2.2; 2.2; 2.2; 2.2; 2.2; 2.2; 2.2; 2.2; 2.2; 2.2; 2.2; 2.2; 2.2; 2.2; 2.2; 2.2; 4.4; 2.2; 2.2; 2.2 INJECTION, SUSPENSION INTRAMUSCULAR at 12:32

## 2022-08-15 RX ADMIN — SODIUM CHLORIDE: 9 INJECTION, SOLUTION INTRAVENOUS at 05:39

## 2022-08-15 RX ADMIN — APIXABAN 10 MG: 5 TABLET, FILM COATED ORAL at 04:22

## 2022-08-15 RX ADMIN — POLYETHYLENE GLYCOL 3350 1 PACKET: 17 POWDER, FOR SOLUTION ORAL at 04:25

## 2022-08-15 ASSESSMENT — PAIN DESCRIPTION - PAIN TYPE: TYPE: ACUTE PAIN

## 2022-08-15 NOTE — PROGRESS NOTES
Bedside report received from RUFUS Fernandes. Patient is alert and oriented x  4, room air, SR-ST on the monitor. Fall precautions are in place. Call light is in reach.

## 2022-08-15 NOTE — DIETARY
NUTRITION SERVICES: BMI - Pt with BMI >40 (=Body mass index is 43.32 kg/m².), Class III obesity. Weight loss counseling not appropriate in acute care setting. RECOMMEND - If appropriate at DC please refer to outpatient nutrition services for weight management.

## 2022-08-15 NOTE — PROGRESS NOTES
Patient had 10 beats of vtach. Vitals:/106, respirations 28, , temp 99.3, SpO2 96 on room air.  Denies chest pain and dizziness. Patient is calm and sleeping. Hospitalist notified and came to bedside.

## 2022-08-15 NOTE — PROGRESS NOTES
Assumed care of pt. Bedside report received from Amber HOOPER . Pt was updated on plan of care. Call light, phone and personal belongings in reach. Bed alarm on and working properly, bed in lowest position, and locked.

## 2022-08-15 NOTE — CARE PLAN
The patient is Stable - Low risk of patient condition declining or worsening    Shift Goals  Clinical Goals: echo results  Patient Goals: go home    Progress made toward(s) clinical / shift goals:    Problem: Pain - Standard  Goal: Alleviation of pain or a reduction in pain to the patient’s comfort goal  Outcome: Progressing     Problem: Respiratory  Goal: Patient will achieve/maintain optimum respiratory ventilation and gas exchange  Outcome: Progressing       Patient is not progressing towards the following goals:

## 2022-08-15 NOTE — PROGRESS NOTES
Patient to be discharged today - patient aware and agreeable to plan. D/c instructions reviewed with patient - ?'s/concerns answered. Meds to beds delievered medications. No home medications locked up per patient.

## 2022-08-15 NOTE — CARE PLAN
Problem: Hyperinflation  Goal: Prevent or improve atelectasis  Description: Target End Date:  3 to 4 days    1. Instruct incentive spirometry usage  2.  Perform hyperinflation therapy as indicated  Outcome: Progressing   PEP QID

## 2022-08-15 NOTE — DISCHARGE INSTRUCTIONS
Discharge Instructions    Discharged to home by car with relative. Discharged via wheelchair, hospital escort: Yes.  Special equipment needed: Not Applicable    Be sure to schedule a follow-up appointment with your primary care doctor or any specialists as instructed.     Discharge Plan:        I understand that a diet low in cholesterol, fat, and sodium is recommended for good health. Unless I have been given specific instructions below for another diet, I accept this instruction as my diet prescription.   Other diet: advance as tolerated     Special Instructions: None    -Is this patient being discharged with medication to prevent blood clots?  No    Is patient discharged on Warfarin / Coumadin?   No       Apixaban oral tablets  What is this medicine?  APIXABAN (a PIX a ban) is an anticoagulant (blood thinner). It is used to lower the chance of stroke in people with a medical condition called atrial fibrillation. It is also used to treat or prevent blood clots in the lungs or in the veins.  This medicine may be used for other purposes; ask your health care provider or pharmacist if you have questions.  COMMON BRAND NAME(S): Eliquis  What should I tell my health care provider before I take this medicine?  They need to know if you have any of these conditions:  antiphospholipid antibody syndrome  bleeding disorders  bleeding in the brain  blood in your stools (black or tarry stools) or if you have blood in your vomit  history of blood clots  history of stomach bleeding  kidney disease  liver disease  mechanical heart valve  an unusual or allergic reaction to apixaban, other medicines, foods, dyes, or preservatives  pregnant or trying to get pregnant  breast-feeding  How should I use this medicine?  Take this medicine by mouth with a glass of water. Follow the directions on the prescription label. You can take it with or without food. If it upsets your stomach, take it with food. Take your medicine at regular  intervals. Do not take it more often than directed. Do not stop taking except on your doctor's advice. Stopping this medicine may increase your risk of a blood clot. Be sure to refill your prescription before you run out of medicine.  Talk to your pediatrician regarding the use of this medicine in children. Special care may be needed.  Overdosage: If you think you have taken too much of this medicine contact a poison control center or emergency room at once.  NOTE: This medicine is only for you. Do not share this medicine with others.  What if I miss a dose?  If you miss a dose, take it as soon as you can. If it is almost time for your next dose, take only that dose. Do not take double or extra doses.  What may interact with this medicine?  This medicine may interact with the following:  aspirin and aspirin-like medicines  certain medicines for fungal infections like ketoconazole and itraconazole  certain medicines for seizures like carbamazepine and phenytoin  certain medicines that treat or prevent blood clots like warfarin, enoxaparin, and dalteparin  clarithromycin  NSAIDs, medicines for pain and inflammation, like ibuprofen or naproxen  rifampin  ritonavir  Cresbard's wort  This list may not describe all possible interactions. Give your health care provider a list of all the medicines, herbs, non-prescription drugs, or dietary supplements you use. Also tell them if you smoke, drink alcohol, or use illegal drugs. Some items may interact with your medicine.  What should I watch for while using this medicine?  Visit your healthcare professional for regular checks on your progress. You may need blood work done while you are taking this medicine. Your condition will be monitored carefully while you are receiving this medicine. It is important not to miss any appointments.  Avoid sports and activities that might cause injury while you are using this medicine. Severe falls or injuries can cause unseen bleeding. Be  careful when using sharp tools or knives. Consider using an electric razor. Take special care brushing or flossing your teeth. Report any injuries, bruising, or red spots on the skin to your healthcare professional.  If you are going to need surgery or other procedure, tell your healthcare professional that you are taking this medicine.  Wear a medical ID bracelet or chain. Carry a card that describes your disease and details of your medicine and dosage times.  What side effects may I notice from receiving this medicine?  Side effects that you should report to your doctor or health care professional as soon as possible:  allergic reactions like skin rash, itching or hives, swelling of the face, lips, or tongue  signs and symptoms of bleeding such as bloody or black, tarry stools; red or dark-brown urine; spitting up blood or brown material that looks like coffee grounds; red spots on the skin; unusual bruising or bleeding from the eye, gums, or nose  signs and symptoms of a blood clot such as chest pain; shortness of breath; pain, swelling, or warmth in the leg  signs and symptoms of a stroke such as changes in vision; confusion; trouble speaking or understanding; severe headaches; sudden numbness or weakness of the face, arm or leg; trouble walking; dizziness; loss of coordination  This list may not describe all possible side effects. Call your doctor for medical advice about side effects. You may report side effects to FDA at 9-190-FDA-1722.  Where should I keep my medicine?  Keep out of the reach of children.  Store at room temperature between 20 and 25 degrees C (68 and 77 degrees F). Throw away any unused medicine after the expiration date.  NOTE: This sheet is a summary. It may not cover all possible information. If you have questions about this medicine, talk to your doctor, pharmacist, or health care provider.  © 2020 [employer]/Gold Standard (2019-08-28 17:39:34)

## 2022-08-15 NOTE — DISCHARGE PLANNING
Case Management Discharge Planning    Admission Date: 8/12/2022  GMLOS: 4.7  ALOS: 3    6-Clicks ADL Score: 24  6-Clicks Mobility Score: 21      Anticipated Discharge Dispo: Discharge Disposition: Discharged to home/self care (01)    DME Needed: No    Action(s) Taken: OTHER    Escalations Completed: None    Medically Clear: Yes    Next Steps: Approved services for $28.06 for medications faxed to [email or web address]. Informed Harriet patient is now in the discharge lounge.     Barriers to Discharge: None    Is the patient up for discharge tomorrow: No

## 2022-08-15 NOTE — CONSULTS
Cardiology Consult Note:    Murtaza Yancey M.D.  Date & Time note created:    8/15/2022   9:03 AM     Referring MD:  Dr. Field    Patient ID:   Name:             Oracio Jurado     YOB: 1980  Age:                 41 y.o.  male   MRN:               [id number]                                                             Reason for Consult:     Consult for anticoagulation in setting of likely thrombotic renal ischemia with concomitant HFREF    History of Present Illness:    Mr. Oracio Jurado is a 41 y.o. male with past medical history of obesity, heart failure (?  Patient denies, on Lasix at home with history of lower extremity edema),  Hypertension, alcohol dependence in remission, who presented 8/12/2022 with constipation with no bowel movement for 2 weeks, abdominal discomfort for 1 week, episode of vomiting 5 days ago, intermittent dull right flank pain without radiation for 1 week.  Patient was subsequently worked up including a CT a of the abdomen and pelvis suggestive renal infarction, no aortic aneurysm or dissection detected, no significant atherosclerotic changes of the renal artery.  Patient received transthoracic echocardiogram detailing heart failure with reduced ejection fraction, dilated right ventricle without valvular abnormalities, but no clear thrombi. Patient is currently without acute complaints, denies any chest pain, palpitations, lower extremity edema.  Patient states he is able to walk multiple flights of stairs without shortness of breath, denies orthopnea or PND at baseline.  Patient is currently undergoing treatment for renal infarction, thrombophilia work-up, treatment for bacterial pneumonia unspecified.  Patient currently denies any complaints or any bleeding from anticoagulation.    Review of Systems:      Constitutional: Denies fevers, Denies weight changes  Eyes: Denies changes in vision, no eye pain  Ears/Nose/Throat/Mouth: Denies nasal congestion or sore throat    Cardiovascular: no chest pain, no palpitations   Respiratory: denies shortness of breath , Denies cough  Gastrointestinal/Hepatic: Denies abdominal pain, nausea, vomiting, diarrhea, constipation or GI bleeding   Genitourinary: Denies dysuria or frequency  Musculoskeletal/Rheum: Denies  joint pain and swelling, denies edema  Skin: Denies rash  Neurological: Denies headache, confusion, memory loss or focal weakness/parasthesias  Psychiatric: denies mood disorder   Endocrine: Roseanna thyroid problems  Heme/Oncology/Lymph Nodes: Denies enlarged lymph nodes, denies brusing or known bleeding disorder  All other systems were reviewed and are negative (AMA/CMS criteria)                Past Medical History:   History reviewed. No pertinent past medical history.  Active Hospital Problems    Diagnosis     Abdominal pain [R10.9]     SIRS (systemic inflammatory response syndrome) (HCC) [R65.10]     Sepsis (HCC) [A41.9]     Pneumonia [J18.9]     Kidney infarction (HCC) [N28.0]     CN (constipation) [K59.00]     UTI (urinary tract infection) [N39.0]     Obesity [E66.9]     Acute kidney injury (HCC) [N17.9]     History of chronic CHF [Z86.79]     Elevated lipase [R74.8]        Past Surgical History:  History reviewed. No pertinent surgical history.    Hospital Medications:    Current Facility-Administered Medications:     Respiratory Therapy Consult, , Nebulization, Continuous RT, Cornell Noonan M.D.    ondansetron (ZOFRAN) syringe/vial injection 4 mg, 4 mg, Intravenous, Q4HRS PRN, Cornell Noonan M.D., 4 mg at 08/12/22 0634    ondansetron (ZOFRAN ODT) dispertab 4 mg, 4 mg, Oral, Q4HRS PRN, Cronell Noonan M.D.    promethazine (PHENERGAN) tablet 12.5-25 mg, 12.5-25 mg, Oral, Q4HRS PRN, Cornell Noonan M.D.    promethazine (PHENERGAN) suppository 12.5-25 mg, 12.5-25 mg, Rectal, Q4HRS PRN, Cornell Noonan M.D.    prochlorperazine (COMPAZINE) injection 5-10 mg, 5-10 mg, Intravenous, Q4HRS PRN, Cornell Noonan M.D.     labetalol (NORMODYNE/TRANDATE) injection 10 mg, 10 mg, Intravenous, Q4HRS PRN, Cornell Nonoan M.D., 10 mg at 08/15/22 0544    acetaminophen (Tylenol) tablet 650 mg, 650 mg, Oral, Q6HRS PRN, Cornell Noonan M.D.    Notify provider if pain remains uncontrolled, , , CONTINUOUS **AND** Use the Numeric Rating Scale (NRS), Schulz-Baker Faces (WBF), or FLACC on regular floors and Critical-Care Pain Observation Tool (CPOT) on ICUs/Trauma to assess pain, , , CONTINUOUS **AND** Pulse Ox, , , CONTINUOUS **AND** Pharmacy Consult Request ...Pain Management Review 1 Each, 1 Each, Other, PHARMACY TO DOSE **AND** If patient difficult to arouse and/or has respiratory depression (respiratory rate of 10 or less), stop any opiates that are currently infusing and call a Rapid Response., , , CONTINUOUS, Cornell Noonan M.D.    oxyCODONE immediate-release (ROXICODONE) tablet 2.5 mg, 2.5 mg, Oral, Q3HRS PRN **OR** oxyCODONE immediate-release (ROXICODONE) tablet 5 mg, 5 mg, Oral, Q3HRS PRN, 5 mg at 08/14/22 2109 **OR** HYDROmorphone (Dilaudid) injection 0.25 mg, 0.25 mg, Intravenous, Q3HRS PRN, Cornell Noonan M.D.    famotidine (PEPCID) tablet 20 mg, 20 mg, Oral, BID, 20 mg at 08/15/22 0423 **OR** [DISCONTINUED] famotidine (PEPCID) injection 20 mg, 20 mg, Intravenous, BID, Cornell Noonan M.D.    polyethylene glycol/lytes (MIRALAX) PACKET 1 Packet, 1 Packet, Oral, BID, Cornell Noonan M.D., 1 Packet at 08/15/22 0425    guaiFENesin dextromethorphan (ROBITUSSIN DM) 100-10 MG/5ML syrup 10 mL, 10 mL, Oral, Q6HRS PRN, Cornell Noonan M.D.    Pharmacy Consult Request - to monitor for nephrotoxic agents, 1 Each, Other, PHARMACY TO DOSE, Cornell Noonan M.D.    cefTRIAXone (Rocephin) syringe 2 g, 2 g, Intravenous, Q24HRS, Cornell Noonan M.D., 2 g at 08/15/22 0534    metoprolol tartrate (LOPRESSOR) tablet 12.5 mg, 12.5 mg, Oral, BID, Cornell Noonan M.D., 12.5 mg at 08/15/22 0422    NS infusion, , Intravenous, Continuous, Leeanna  AB Porter M.D., Last Rate: 125 mL/hr at 08/15/22 0539, New Bag at 08/15/22 0539    senna-docusate (PERICOLACE or SENOKOT S) 8.6-50 MG per tablet 2 Tablet, 2 Tablet, Oral, BID, 2 Tablet at 08/15/22 0423 **AND** polyethylene glycol/lytes (MIRALAX) PACKET 1 Packet, 1 Packet, Oral, BID, 1 Packet at 08/15/22 0424 **AND** magnesium hydroxide (MILK OF MAGNESIA) suspension 30 mL, 30 mL, Oral, QDAY PRN, 30 mL at 08/14/22 2110 **AND** bisacodyl (DULCOLAX) suppository 10 mg, 10 mg, Rectal, QDAY PRN, Leeanna Porter M.D.    apixaban (ELIQUIS) tablet 10 mg, 10 mg, Oral, BID, 10 mg at 08/15/22 0422 **FOLLOWED BY** [START ON 8/19/2022] apixaban (ELIQUIS) tablet 5 mg, 5 mg, Oral, BID, Leeanna Porter M.D.    Current Outpatient Medications:  Medications Prior to Admission   Medication Sig Dispense Refill Last Dose    furosemide (LASIX) 40 MG Tab Take 40 mg by mouth every day.   8/11/2022 at 0800    potassium chloride SA (KDUR) 20 MEQ Tab CR Take 20 mEq by mouth every day.   8/11/2022 at 0800    metoprolol tartrate (LOPRESSOR) 25 MG Tab Take 12.5 mg by mouth 2 times a day.   8/11/2022 at 2020    losartan (COZAAR) 25 MG Tab Take 25 mg by mouth every day.   8/11/2022 at 0800       Medication Allergy:  No Known Allergies    Family History:  Patient has a family history significant for heart failure in his father and (unclear etiology per patient) type 2 diabetes, hypertension in father and other remote family members.  Rest of pertinent review of family history negative except for as above.    Social History:  Social History     Socioeconomic History    Marital status: Single     Spouse name: Not on file    Number of children: Not on file    Years of education: Not on file    Highest education level: Not on file   Occupational History    Not on file   Tobacco Use    Smoking status: Never    Smokeless tobacco: Never   Vaping Use    Vaping Use: Never used   Substance and Sexual Activity    Alcohol use: Not Currently    Drug use:  "Not Currently    Sexual activity: Not on file   Other Topics Concern    Not on file   Social History Narrative    Not on file     Social Determinants of Health     Financial Resource Strain: Not on file   Food Insecurity: Not on file   Transportation Needs: Not on file   Physical Activity: Not on file   Stress: Not on file   Social Connections: Not on file   Intimate Partner Violence: Not on file   Housing Stability: Not on file   Pertinent social history remarkable for history past history of marijuana, EtOH, and cocaine use.      Physical Exam:  Vitals/ General Appearance:   Weight/BMI: Body mass index is 43.32 kg/m².  BP (!) 152/108   Pulse 97   Temp 36.4 °C (97.6 °F) (Temporal)   Resp 18   Ht 1.803 m (5' 11\")   Wt (!) 141 kg (310 lb 10.1 oz)   SpO2 94%   Vitals:    08/15/22 0329 08/15/22 0500 08/15/22 0613 08/15/22 0707   BP: (!) 159/111 (!) 150/111 (!) 134/99 (!) 152/108   Pulse: (!) 113 (!) 107 (!) 102 97   Resp: 17   18   Temp: 36.4 °C (97.5 °F)   36.4 °C (97.6 °F)   TempSrc: Temporal   Temporal   SpO2: 94%   94%   Weight:       Height:         Oxygen Therapy:  Pulse Oximetry: 94 %, O2 (LPM): 0, O2 Delivery Device: None - Room Air    Constitutional:   Well developed, Well nourished, Obese male, in no acute distress  HENMT:  Normocephalic, Atraumatic, Oropharynx moist mucous membranes, No oral exudates, Nose normal.  No thyromegaly.  Eyes:  EOMI, Conjunctiva normal, No discharge.  Neck:  Normal range of motion, No cervical tenderness,  no JVD.  Cardiovascular:  Normal heart rate, Normal rhythm, No murmurs, No rubs, No gallops.   Extremitites with intact distal pulses, no cyanosis, or edema.  Lungs:  Normal breath sounds, breath sounds clear to auscultation bilaterally,  no crackles, no wheezing.   Abdomen: Bowel sounds normal, Soft, No tenderness, No guarding, No rebound, No masses, No hepatosplenomegaly.  Skin: Warm, Dry, No erythema, No rash, no induration.  Neurologic: Alert & oriented x 3, No focal " deficits noted, cranial nerves II through X are grossly intact.  Psychiatric: Affect normal, Judgment normal, Mood normal.      MDM (Data Review):     Records reviewed and summarized in current documentation    Lab Data Review:  Recent Results (from the past 24 hour(s))   EC-ECHOCARDIOGRAM COMPLETE W/ CONT    Collection Time: 08/14/22  4:19 PM   Result Value Ref Range    Eject.Frac. MOD BP 27.46     Eject.Frac. MOD 4C 31.17     Eject.Frac. MOD 2C 26.95     Left Ventrical Ejection Fraction 28    PROCALCITONIN    Collection Time: 08/15/22  7:03 AM   Result Value Ref Range    Procalcitonin 0.27 (H) <0.25 ng/mL       Imaging/Procedures Review:    Chest Xray:  Reviewed    EKG:   As in HPI.    ECHO:  Severely reduced left ventricular systolic function.  The right ventricle is dilated with reduced systolic function  Dilated inferior vena cava with inspiratory collapse.  Diastolic function is abnormal, but grade cannot be determined.  Unable to estimate pulmonary artery pressure due to an inadequate   tricuspid regurgitant jet.    MDM (Assessment and Plan):     Active Hospital Problems    Diagnosis     Abdominal pain [R10.9]     SIRS (systemic inflammatory response syndrome) (HCC) [R65.10]     Sepsis (HCC) [A41.9]     Pneumonia [J18.9]     Kidney infarction (HCC) [N28.0]     CN (constipation) [K59.00]     UTI (urinary tract infection) [N39.0]     Obesity [E66.9]     Acute kidney injury (HCC) [N17.9]     History of chronic CHF [Z86.79]     Elevated lipase [R74.8]      Mr. Jurado is a very pleasant 41-year-old male with a past medical history significant for hypertension, chronic lower extremity edema and possible heart failure (patient denies janine heart failure, though on 40 mg p.o. Lasix daily along with losartan and beta-blocker as outpatient), renal infarction, and pneumonia consulted for need for long-term anticoagulation in setting of renal ischemia concern for possible cardiac origin of thrombi.  Echocardiogram  without clear focus of thrombi, however does have global hypokinesis and biventricular failure.  Etiology of heart failure unknown most likely ischemic heart disease, (history of cocaine use) however patient recently off of alcohol use alcoholic cardiomyopathy or arrhythmic induced cardiomyopathy are also in differential.     #Kidney infarction  #Heart failure with reduced ejection fraction (EF of 25 to 30% on 8/14/2022)   #Need for anticoagulation  - Continue anticoagulation for now, consider coagulopathy work-up for further causes of renal ischemic thrombi, nephrotic syndrome can be another possible cause.  -Recommend TSH/T4 and A1C  -Continue to monitor volume status, continue telemetry for possible arrhythmias, Zio patch on discharge.  - Once patient cleared for discharge, restart goal-directed therapy for heart failure  --Recommend further outpatient follow-up with cardiology to determine the exact nature of heart failure etiology including possible stress test or PCI given result findings.  -Follow-up with cardiology for outpatient CHF visit    Thank you for your interesting consult, we will sign off for now, please feel free to reconsult with any further questions.    Resident: Dr. Yancey  Attending:Dr. Dorman

## 2022-08-15 NOTE — PROGRESS NOTES
Pt has DC order in, discharge lounge order placed.     Pt does not meet certain CHF meds due to renal function. See cards note.     [facility], left voice mail.

## 2022-08-16 LAB — F5 P.R506Q BLD/T QL: NEGATIVE

## 2022-08-17 LAB
BACTERIA BLD CULT: NORMAL
BACTERIA BLD CULT: NORMAL
F2 C.20210G>A GENO BLD/T: NEGATIVE
SIGNIFICANT IND 70042: NORMAL
SIGNIFICANT IND 70042: NORMAL
SITE SITE: NORMAL
SITE SITE: NORMAL
SOURCE SOURCE: NORMAL
SOURCE SOURCE: NORMAL

## 2022-08-18 ENCOUNTER — APPOINTMENT (OUTPATIENT)
Dept: RADIOLOGY | Facility: MEDICAL CENTER | Age: 42
End: 2022-08-18
Payer: COMMERCIAL

## 2022-08-18 PROCEDURE — 99285 EMERGENCY DEPT VISIT HI MDM: CPT

## 2022-08-18 PROCEDURE — 36415 COLL VENOUS BLD VENIPUNCTURE: CPT

## 2022-08-18 PROCEDURE — 96374 THER/PROPH/DIAG INJ IV PUSH: CPT

## 2022-08-18 PROCEDURE — 96375 TX/PRO/DX INJ NEW DRUG ADDON: CPT

## 2022-08-18 ASSESSMENT — FIBROSIS 4 INDEX: FIB4 SCORE: 0.45

## 2022-08-19 ENCOUNTER — HOSPITAL ENCOUNTER (OUTPATIENT)
Dept: RADIOLOGY | Facility: MEDICAL CENTER | Age: 42
End: 2022-08-19

## 2022-08-19 ENCOUNTER — APPOINTMENT (OUTPATIENT)
Dept: RADIOLOGY | Facility: MEDICAL CENTER | Age: 42
End: 2022-08-19
Attending: EMERGENCY MEDICINE
Payer: COMMERCIAL

## 2022-08-19 ENCOUNTER — HOSPITAL ENCOUNTER (EMERGENCY)
Facility: MEDICAL CENTER | Age: 42
End: 2022-08-19
Attending: EMERGENCY MEDICINE
Payer: COMMERCIAL

## 2022-08-19 VITALS
WEIGHT: 305 LBS | TEMPERATURE: 96.7 F | OXYGEN SATURATION: 92 % | DIASTOLIC BLOOD PRESSURE: 83 MMHG | RESPIRATION RATE: 16 BRPM | HEIGHT: 71 IN | HEART RATE: 107 BPM | SYSTOLIC BLOOD PRESSURE: 126 MMHG | BODY MASS INDEX: 42.7 KG/M2

## 2022-08-19 DIAGNOSIS — M10.9 ACUTE GOUT OF LEFT ANKLE, UNSPECIFIED CAUSE: ICD-10-CM

## 2022-08-19 DIAGNOSIS — R00.0 TACHYCARDIA: ICD-10-CM

## 2022-08-19 DIAGNOSIS — M25.572 ACUTE LEFT ANKLE PAIN: ICD-10-CM

## 2022-08-19 LAB
ALBUMIN SERPL BCP-MCNC: 3.9 G/DL (ref 3.2–4.9)
ALBUMIN/GLOB SERPL: 1 G/DL
ALP SERPL-CCNC: 118 U/L (ref 30–99)
ALT SERPL-CCNC: 34 U/L (ref 2–50)
ANION GAP SERPL CALC-SCNC: 14 MMOL/L (ref 7–16)
AST SERPL-CCNC: 21 U/L (ref 12–45)
BASOPHILS # BLD AUTO: 0.7 % (ref 0–1.8)
BASOPHILS # BLD: 0.08 K/UL (ref 0–0.12)
BILIRUB SERPL-MCNC: 0.5 MG/DL (ref 0.1–1.5)
BUN SERPL-MCNC: 13 MG/DL (ref 8–22)
CALCIUM SERPL-MCNC: 9.4 MG/DL (ref 8.5–10.5)
CHLORIDE SERPL-SCNC: 102 MMOL/L (ref 96–112)
CO2 SERPL-SCNC: 20 MMOL/L (ref 20–33)
CREAT SERPL-MCNC: 1.43 MG/DL (ref 0.5–1.4)
CRP SERPL HS-MCNC: 2.19 MG/DL (ref 0–0.75)
EOSINOPHIL # BLD AUTO: 0.15 K/UL (ref 0–0.51)
EOSINOPHIL NFR BLD: 1.3 % (ref 0–6.9)
ERYTHROCYTE [DISTWIDTH] IN BLOOD BY AUTOMATED COUNT: 52.4 FL (ref 35.9–50)
GFR SERPLBLD CREATININE-BSD FMLA CKD-EPI: 62 ML/MIN/1.73 M 2
GLOBULIN SER CALC-MCNC: 4 G/DL (ref 1.9–3.5)
GLUCOSE SERPL-MCNC: 107 MG/DL (ref 65–99)
HCT VFR BLD AUTO: 42.9 % (ref 42–52)
HGB BLD-MCNC: 14.2 G/DL (ref 14–18)
IMM GRANULOCYTES # BLD AUTO: 0.05 K/UL (ref 0–0.11)
IMM GRANULOCYTES NFR BLD AUTO: 0.4 % (ref 0–0.9)
INR PPP: 1.38 (ref 0.87–1.13)
LYMPHOCYTES # BLD AUTO: 1.73 K/UL (ref 1–4.8)
LYMPHOCYTES NFR BLD: 15.4 % (ref 22–41)
MCH RBC QN AUTO: 29.5 PG (ref 27–33)
MCHC RBC AUTO-ENTMCNC: 33.1 G/DL (ref 33.7–35.3)
MCV RBC AUTO: 89 FL (ref 81.4–97.8)
MONOCYTES # BLD AUTO: 0.98 K/UL (ref 0–0.85)
MONOCYTES NFR BLD AUTO: 8.7 % (ref 0–13.4)
NEUTROPHILS # BLD AUTO: 8.28 K/UL (ref 1.82–7.42)
NEUTROPHILS NFR BLD: 73.5 % (ref 44–72)
NRBC # BLD AUTO: 0 K/UL
NRBC BLD-RTO: 0 /100 WBC
PLATELET # BLD AUTO: 543 K/UL (ref 164–446)
PMV BLD AUTO: 8.8 FL (ref 9–12.9)
POTASSIUM SERPL-SCNC: 4.7 MMOL/L (ref 3.6–5.5)
PROT SERPL-MCNC: 7.9 G/DL (ref 6–8.2)
PROTHROMBIN TIME: 16.7 SEC (ref 12–14.6)
RBC # BLD AUTO: 4.82 M/UL (ref 4.7–6.1)
SODIUM SERPL-SCNC: 136 MMOL/L (ref 135–145)
URATE SERPL-MCNC: 8.4 MG/DL (ref 2.5–8.3)
WBC # BLD AUTO: 11.3 K/UL (ref 4.8–10.8)

## 2022-08-19 PROCEDURE — 700102 HCHG RX REV CODE 250 W/ 637 OVERRIDE(OP): Performed by: EMERGENCY MEDICINE

## 2022-08-19 PROCEDURE — 85025 COMPLETE CBC W/AUTO DIFF WBC: CPT

## 2022-08-19 PROCEDURE — 700111 HCHG RX REV CODE 636 W/ 250 OVERRIDE (IP): Performed by: EMERGENCY MEDICINE

## 2022-08-19 PROCEDURE — 85610 PROTHROMBIN TIME: CPT

## 2022-08-19 PROCEDURE — 73630 X-RAY EXAM OF FOOT: CPT | Mod: LT

## 2022-08-19 PROCEDURE — A9270 NON-COVERED ITEM OR SERVICE: HCPCS | Performed by: EMERGENCY MEDICINE

## 2022-08-19 PROCEDURE — 86140 C-REACTIVE PROTEIN: CPT

## 2022-08-19 PROCEDURE — 84550 ASSAY OF BLOOD/URIC ACID: CPT

## 2022-08-19 PROCEDURE — 73610 X-RAY EXAM OF ANKLE: CPT | Mod: LT

## 2022-08-19 PROCEDURE — 80053 COMPREHEN METABOLIC PANEL: CPT

## 2022-08-19 RX ORDER — CEPHALEXIN 500 MG/1
500 CAPSULE ORAL ONCE
Status: COMPLETED | OUTPATIENT
Start: 2022-08-19 | End: 2022-08-19

## 2022-08-19 RX ORDER — ONDANSETRON 2 MG/ML
4 INJECTION INTRAMUSCULAR; INTRAVENOUS ONCE
Status: COMPLETED | OUTPATIENT
Start: 2022-08-19 | End: 2022-08-19

## 2022-08-19 RX ORDER — CEPHALEXIN 500 MG/1
500 CAPSULE ORAL 4 TIMES DAILY
Qty: 20 CAPSULE | Refills: 0 | Status: SHIPPED | OUTPATIENT
Start: 2022-08-19 | End: 2022-08-24

## 2022-08-19 RX ORDER — INDOMETHACIN 50 MG/1
50 CAPSULE ORAL ONCE
Status: COMPLETED | OUTPATIENT
Start: 2022-08-19 | End: 2022-08-19

## 2022-08-19 RX ORDER — MORPHINE SULFATE 4 MG/ML
4 INJECTION INTRAVENOUS ONCE
Status: COMPLETED | OUTPATIENT
Start: 2022-08-19 | End: 2022-08-19

## 2022-08-19 RX ORDER — INDOMETHACIN 50 MG/1
50 CAPSULE ORAL 3 TIMES DAILY
Qty: 15 CAPSULE | Refills: 0 | Status: SHIPPED | OUTPATIENT
Start: 2022-08-19 | End: 2022-08-24

## 2022-08-19 RX ORDER — COLCHICINE 0.6 MG/1
0.6 TABLET ORAL DAILY
Qty: 10 TABLET | Refills: 0 | Status: SHIPPED | OUTPATIENT
Start: 2022-08-19 | End: 2022-08-29

## 2022-08-19 RX ADMIN — ONDANSETRON 4 MG: 2 INJECTION INTRAMUSCULAR; INTRAVENOUS at 01:54

## 2022-08-19 RX ADMIN — CEPHALEXIN 500 MG: 500 CAPSULE ORAL at 03:14

## 2022-08-19 RX ADMIN — INDOMETHACIN 50 MG: 50 CAPSULE ORAL at 03:14

## 2022-08-19 RX ADMIN — MORPHINE SULFATE 4 MG: 4 INJECTION INTRAVENOUS at 01:54

## 2022-08-19 ASSESSMENT — PAIN DESCRIPTION - PAIN TYPE: TYPE: ACUTE PAIN

## 2022-08-19 NOTE — ED PROVIDER NOTES
ED Provider Note    CHIEF COMPLAINT  Chief Complaint   Patient presents with    Ankle Pain     Pt states he woke up yesterday morning with left foot and ankle pain. Pt denies any recent trauma. Pt endorsing 10/10 pain that is throbbing, CMS intact. Pt denies CP, SOB, or N/V.      HPI  Patient is a 41-year-old male with a complex past medical history including CHF, hypertension, prior gout who presents emergency room for evaluation of left-sided ankle pain.  This started rather spontaneously over the course of the last 24 to 48 hours.  The patient was recently admitted and discharged following heart failure exacerbation and does report that overall the amount of swelling in his ankles is generally improved though he noted pain and tenderness on the outside portion of the left ankle going into the dorsum of the foot that did not start with any mechanical injury or rolling of the ankle.  He has noticed some warmth to it but no skin changes, no drainage or fluctuance of his socks nor is he had any numbness or tingling in the remainder of the foot.  He has had difficulty bearing weight, moderate to severe pain when attempting to do so and was noted to be somewhat tachycardic on arrival.  He denies any fevers or chills, overall feels improved from previous hospitalization with no shortness of breath, chest pain or other constitutional complaints at this time.  He says the last time he had gout was in both of his feet but is unsure about the severity with last time he was on a medication regimen.    PPE Note: I personally donned full PPE for all patient encounters during this visit, including being clean-shaven with an N95 respirator mask, gloves, and goggles.     REVIEW OF SYSTEMS  See HPI for further details. All other systems are negative.     PAST MEDICAL HISTORY   has a past medical history of HF (heart failure) (HCC) and Hypertension.    SOCIAL HISTORY  Social History     Tobacco Use    Smoking status: Never     "Smokeless tobacco: Never   Vaping Use    Vaping Use: Never used   Substance and Sexual Activity    Alcohol use: Not Currently    Drug use: Not Currently    Sexual activity: Not on file       SURGICAL HISTORY  patient denies any surgical history    CURRENT MEDICATIONS  Home Medications       Reviewed by Fatuma Sanchez R.N. (Registered Nurse) on 08/18/22 at 2233  Med List Status: Not Addressed     Medication Last Dose Status   amoxicillin-clavulanate (AUGMENTIN) 875-125 MG Tab  Active   apixaban (ELIQUIS) 5mg Tab  Active   apixaban (ELIQUIS) 5mg Tab  Active   carvedilol (COREG) 6.25 MG Tab  Active   dapagliflozin propanediol (FARXIGA) 10 MG Tab  Active   furosemide (LASIX) 40 MG Tab  Active                  ALLERGIES  No Known Allergies    PHYSICAL EXAM  VITAL SIGNS: /83   Pulse (!) 107   Temp 35.9 °C (96.7 °F) (Temporal)   Resp 16   Ht 1.803 m (5' 11\")   Wt (!) 138 kg (305 lb)   SpO2 92%   BMI 42.54 kg/m²   Pulse ox interpretation: I interpret this pulse ox as normal.  Genl: M sitting in gurney uncomfortably, speaking clearly, appears in mild distress   Head: NC/AT   ENT: Mucous membranes moist, posterior pharynx clear, uvula midline, nares patent bilaterally   Eyes: Normal sclera, pupils equal round reactive to light  Neck: Supple, FROM, no LAD appreciated   Pulmonary: Lungs are clear to auscultation bilaterally  Chest: No TTP  CV:  RRR, no murmur appreciated, pulses 2+ in both upper and lower extremities,  Abdomen: soft, NT/ND; no rebound/guarding, no masses palpated, no HSM   : no CVA or suprapubic tenderness   Musculoskeletal: Pain free ROM of the neck. Moving upper and lower extremities and spontaneous in coordinated fashion  Neuro: A&Ox4 (person, place, time, situation), speech fluent, gait steady, no focal deficits appreciated  Skin: No rash or lesions.  No pallor or jaundice.  No cyanosis.  Warm and dry.    DIAGNOSTIC STUDIES / PROCEDURES    LABS  Labs Reviewed   CBC WITH DIFFERENTIAL - " Abnormal; Notable for the following components:       Result Value    WBC 11.3 (*)     MCHC 33.1 (*)     RDW 52.4 (*)     Platelet Count 543 (*)     MPV 8.8 (*)     Neutrophils-Polys 73.50 (*)     Lymphocytes 15.40 (*)     Neutrophils (Absolute) 8.28 (*)     Monos (Absolute) 0.98 (*)     All other components within normal limits   COMP METABOLIC PANEL - Abnormal; Notable for the following components:    Glucose 107 (*)     Creatinine 1.43 (*)     Alkaline Phosphatase 118 (*)     Globulin 4.0 (*)     All other components within normal limits   CRP QUANTITIVE (NON-CARDIAC) - Abnormal; Notable for the following components:    Stat C-Reactive Protein 2.19 (*)     All other components within normal limits   PROTHROMBIN TIME - Abnormal; Notable for the following components:    PT 16.7 (*)     INR 1.38 (*)     All other components within normal limits    Narrative:     Indicate which anticoagulants the patient is on:->ARGATROBAN   URIC ACID - Abnormal; Notable for the following components:    Uric Acid 8.4 (*)     All other components within normal limits   ESTIMATED GFR     RADIOLOGY  DX-ANKLE 3+ VIEWS LEFT   Final Result         1.  No acute traumatic bony injury.   2.  Pes planus         DX-FOOT-COMPLETE 3+ LEFT   Final Result         1.  No acute traumatic bony injury.   2.  Pes planus        COURSE & MEDICAL DECISION MAKING  Pertinent Labs & Imaging studies reviewed. (See chart for details)    DDX:  Gout, cellulitis, abscess, fracture, ligamentous injury    MDM  Initial evaluation at 0103:  Patient is seen and evaluated for symptoms as described above.  The the patient appears in moderate pain and discomfort, has history of gout and recent hospitalization for which he does have anticoagulation on board for a thrombus in the portal system.  He does not have calf tenderness or unilateral leg swelling but he does have warmth and erythema along with some dependent edema on the lateral aspect of the malleolus into the  dorsum of the foot.  Passive range of motion of the ankle is improved and I do believe the likelihood of this being a septic joint is low.  He remains afebrile but was tachycardic initially I believe secondary to pain.  With his comorbidities and recent hospitalization I did obtain medical work-up.    Patient's pain is addressed with initial dose of morphine and lab work is obtained.  There is a very faint leukocytosis with leftward shift but no bandemia.  CRP is slightly elevated 2.1, uric acid level is also slightly elevated.  Patient has no gross electrolyte derangement, creatinine at 1.43 with normal GFR.  Chart review showed that the patient actually had a renal infarct and had an    Treated with anticoagulation as this was not a complete renal infarct and has some right-sided inferior lobe infarct and has been admitted for possible cardiac sourcing.  At this time the patient remains on anticoagulation, does not have clinical signs of an acute DVT and overall with his pain and tenderness being on the superficial structures with warmth and erythema do believe is more likely that he has some element of inflammatory arthropathy versus superficial cellulitis.  He remains nontoxic and afebrile and after receiving Keflex and indomethacin he is reevaluated and feels improved.  Left lower extremity continues to have good distal perfusion and no pain out of proportion.    At this time the patient was counseled regarding his laboratory work-up medical imaging and resting heart rate is in the 90s.  He remains afebrile and states that in the past he was given a medication concerned with the CAD.  Reviewed his past medical records show that he had given a short course of colchicine in the past and responded well to this.  Additionally discussed with pharmacy as he had been on multiple recent medications and short course of indomethacin and colchicine would not be contraindicated as long as no longer than a short course of  therapy.  Patient does not have a bleeding risk factors, otherwise is well-appearing and would like to try walking boot as well.    The patient will not drink alcohol nor drive with prescribed medications. The patient will return for worsening symptoms and is stable at the time of discharge. The patient verbalizes understanding and will comply.    FINAL IMPRESSION  Visit Diagnoses     ICD-10-CM   1. Acute left ankle pain  M25.572   2. Abscess or cellulitis of foot  L03.119    L02.619   3. Tachycardia  R00.0   4. Acute gout of left ankle, unspecified cause  M10.9     Electronically signed by: Ismael Machuca M.D., 8/19/2022 1:03 AM

## 2022-08-19 NOTE — ED NOTES
Discharge paperwork given to pt, all questions answered, all belongings accounted for, pt wheeled to ER exit

## 2022-08-19 NOTE — ED TRIAGE NOTES
"Chief Complaint   Patient presents with    Ankle Pain     Pt states he woke up yesterday morning with left foot and ankle pain. Pt denies any recent trauma. Pt endorsing 10/10 pain that is throbbing, CMS intact. Pt denies CP, SOB, or N/V.        42 yo to triage in  for above complaint. Pt states he is unable to bear weight on affected extremity due to the pain, uses crutches. Dx ordered.     Pt placed in lobby. Pt educated on triage process. Pt encouraged to alert staff for any changes.     Patient and staff wearing appropriate PPE    BP (!) 152/111   Pulse (!) 118   Temp 36.3 °C (97.3 °F) (Temporal)   Resp 18   Ht 1.803 m (5' 11\")   Wt (!) 138 kg (305 lb)   SpO2 99%   BMI 42.54 kg/m²     "

## 2022-08-30 ENCOUNTER — OFFICE VISIT (OUTPATIENT)
Dept: CARDIOLOGY | Facility: MEDICAL CENTER | Age: 42
End: 2022-08-30
Payer: COMMERCIAL

## 2022-08-30 VITALS
RESPIRATION RATE: 16 BRPM | DIASTOLIC BLOOD PRESSURE: 90 MMHG | BODY MASS INDEX: 43.4 KG/M2 | SYSTOLIC BLOOD PRESSURE: 122 MMHG | WEIGHT: 310 LBS | HEART RATE: 102 BPM | HEIGHT: 71 IN | OXYGEN SATURATION: 97 %

## 2022-08-30 DIAGNOSIS — N28.0 RENAL INFARCT (HCC): ICD-10-CM

## 2022-08-30 DIAGNOSIS — R73.01 ELEVATED FASTING GLUCOSE: ICD-10-CM

## 2022-08-30 DIAGNOSIS — I50.22 CHRONIC SYSTOLIC CONGESTIVE HEART FAILURE, NYHA CLASS 3 (HCC): ICD-10-CM

## 2022-08-30 DIAGNOSIS — R06.09 OTHER FORM OF DYSPNEA: ICD-10-CM

## 2022-08-30 DIAGNOSIS — I50.20 HFREF (HEART FAILURE WITH REDUCED EJECTION FRACTION) (HCC): ICD-10-CM

## 2022-08-30 DIAGNOSIS — I50.20 ACC/AHA STAGE C SYSTOLIC HEART FAILURE (HCC): ICD-10-CM

## 2022-08-30 DIAGNOSIS — Z79.899 HIGH RISK MEDICATION USE: ICD-10-CM

## 2022-08-30 PROCEDURE — 99214 OFFICE O/P EST MOD 30 MIN: CPT | Mod: 25 | Performed by: NURSE PRACTITIONER

## 2022-08-30 PROCEDURE — 94618 PULMONARY STRESS TESTING: CPT | Performed by: NURSE PRACTITIONER

## 2022-08-30 RX ORDER — FUROSEMIDE 20 MG/1
20 TABLET ORAL DAILY
Qty: 90 TABLET | Refills: 3 | Status: SHIPPED | OUTPATIENT
Start: 2022-08-30

## 2022-08-30 RX ORDER — LOSARTAN POTASSIUM 25 MG/1
25 TABLET ORAL DAILY
Qty: 90 TABLET | Refills: 3 | Status: SHIPPED | OUTPATIENT
Start: 2022-08-30 | End: 2022-10-26

## 2022-08-30 RX ORDER — DAPAGLIFLOZIN 10 MG/1
10 TABLET, FILM COATED ORAL DAILY
Qty: 90 TABLET | Refills: 3 | Status: SHIPPED | OUTPATIENT
Start: 2022-08-30 | End: 2022-11-17 | Stop reason: SDUPTHER

## 2022-08-30 RX ORDER — CARVEDILOL 6.25 MG/1
6.25 TABLET ORAL 2 TIMES DAILY WITH MEALS
Qty: 180 TABLET | Refills: 3 | Status: SHIPPED | OUTPATIENT
Start: 2022-08-30 | End: 2022-09-29

## 2022-08-30 ASSESSMENT — 6 MINUTE WALK TEST (6MWT): TOTAL DISTANCE WALKED (METERS): 311

## 2022-08-30 ASSESSMENT — MINNESOTA LIVING WITH HEART FAILURE QUESTIONNAIRE (MLHF)
HAVING TO SIT OR LIE DOWN DURING THE DAY: 0
DIFFICULTY SLEEPING WELL AT NIGHT: 5
MAKING YOU WORRY: 5
DIFFICULTY SOCIALIZING WITH FAMILY OR FRIENDS: 3
MAKING YOU FEEL DEPRESSED: 4
TIRED, FATIGUED OR LOW ON ENERGY: 1
MAKING YOU SHORT OF BREATH: 5
WORKING AROUND THE HOUSE OR YARD DIFFICULT: 5
FEELING LIKE A BURDEN TO FAMILY AND FRIENDS: 3
COSTING YOU MONEY FOR MEDICAL CARE: 5
TOTAL_SCORE: 81
MAKING YOU STAY IN A HOSPITAL: 1
DIFFICULTY WORKING TO EARN A LIVING: 5
GIVING YOU SIDE EFFECTS FROM TREATMENTS: 4
DIFFICULTY WITH SEXUAL ACTIVITIES: 5
DIFFICULTY GOING AWAY FROM HOME: 4
DIFFICULTY WITH RECREATIONAL PASTIMES, SPORTS, HOBBIES: 5
DIFFICULTY TO CONCENTRATE OR REMEMBERING THINGS: 3
EATING LESS FOODS YOU LIKE: 5
WALKING ABOUT OR CLIMBING STAIRS DIFFICULT: 5
LOSS OF SELF CONTROL IN YOUR LIFE: 3
SWELLING IN ANKLES OR LEGS: 5

## 2022-08-30 ASSESSMENT — FIBROSIS 4 INDEX: FIB4 SCORE: 0.29

## 2022-08-30 NOTE — PROGRESS NOTES
Chief Complaint   Patient presents with    Congestive Heart Failure     F/v dx: CHF (congestive heart failure), NYHA class II, acute on chronic, systolic (HCC)       Subjective     Oracio Jurado is a 41 y.o. male who presents today as heart failure new with past medical history of obesity, hypertension, alcohol dependence (in remission).  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    Today, patient reports bilateral lower extremity edema has improved since receiving Lasix in the hospital.  Patient reports he stopped excessive alcohol consumption since his hospitalization.  Patient also reports remote history of meth abuse 20 years ago.  Patient did not take his medications today; we discussed the importance of compliance.  Otherwise, patient denies chest pain, shortness of breath, palpitations, dizziness/lightheadedness, orthopnea, PND or Edema.  Patient has not been weighing himself at home. Based on physical examination findings, patient is euvolemic. No JVD, lungs are clear to auscultation, no pitting edema in bilateral lower extremities, no ascites. Dry weight is 310 lbs.    We will rule out ischemic cardiomyopathy with stress test.  We will continue GDMT by providing refills on his Coreg and Farxiga.  Renal function has improved since discharge we will initiate losartan 25 mg daily we will close follow-up for renal and electrolyte monitoring in 2 weeks with BMP.  Patient to continue Eliquis 5 mg twice daily.  We will follow-up regarding his EF in 3 months.  Will refer patient to pharmacotherapy clinic for heart failure for assistance in further optimization, between provider office visits.  Patient verbalizes understanding agreeable plan of care.    8/30/2022: Initial 6 minute walk test, patient was able to complete 311 m during 6 minute walk test. O2 saturation at baseline was 97% and at the end of the  test, the O2 saturation was 99%. Patient reported level 0.5 of dyspnea on Son scale.  Patient was able to walk for 6 minutes.      Past Medical History:   Diagnosis Date    HF (heart failure) (HCC)     Hypertension      History reviewed. No pertinent surgical history.  History reviewed. No pertinent family history.  Social History     Socioeconomic History    Marital status: Single     Spouse name: Not on file    Number of children: Not on file    Years of education: Not on file    Highest education level: Not on file   Occupational History    Not on file   Tobacco Use    Smoking status: Never    Smokeless tobacco: Never   Vaping Use    Vaping Use: Never used   Substance and Sexual Activity    Alcohol use: Not Currently    Drug use: Not Currently    Sexual activity: Not on file   Other Topics Concern    Not on file   Social History Narrative    Not on file     Social Determinants of Health     Financial Resource Strain: Not on file   Food Insecurity: Not on file   Transportation Needs: Not on file   Physical Activity: Not on file   Stress: Not on file   Social Connections: Not on file   Intimate Partner Violence: Not on file   Housing Stability: Not on file     No Known Allergies  Outpatient Encounter Medications as of 2022   Medication Sig Dispense Refill    losartan (COZAAR) 25 MG Tab Take 1 Tablet by mouth every day. 90 Tablet 3    furosemide (LASIX) 20 MG Tab Take 1 Tablet by mouth every day. 90 Tablet 3    dapagliflozin propanediol (FARXIGA) 10 MG Tab Take 1 Tablet by mouth every day for 90 days. 90 Tablet 3    [DISCONTINUED] carvedilol (COREG) 6.25 MG Tab Take 1 Tablet by mouth 2 times a day with meals for 90 days. 180 Tablet 3    [] apixaban (ELIQUIS) 5mg Tab Take 2 Tablets by mouth 2 times a day for 4 days, then 1 tablet 2 times a day thereafter. Indications: DVT/PE 68 Tablet 0    apixaban (ELIQUIS) 5mg Tab Take 1 Tablet by mouth 2 times a day. Indications: DVT/PE 60 Tablet 0    [DISCONTINUED]  "carvedilol (COREG) 6.25 MG Tab Take 1 Tablet by mouth 2 times a day with meals for 90 days. 180 Tablet 0    [DISCONTINUED] dapagliflozin propanediol (FARXIGA) 10 MG Tab Take 1 Tablet by mouth every day for 90 days. 90 Tablet 0    [DISCONTINUED] furosemide (LASIX) 40 MG Tab Take 40 mg by mouth every day.       No facility-administered encounter medications on file as of 8/30/2022.     Review of Systems   Constitutional:  Negative for fever, malaise/fatigue and weight loss.   Eyes:  Negative for blurred vision.   Respiratory:  Negative for cough and shortness of breath.    Cardiovascular:  Negative for chest pain, palpitations, orthopnea, claudication, leg swelling and PND.   Gastrointestinal:  Negative for abdominal pain, blood in stool, nausea and vomiting.   Genitourinary:  Negative for dysuria, frequency and hematuria.   Musculoskeletal:  Negative for falls and myalgias.   Neurological:  Negative for dizziness, tingling and loss of consciousness.   Endo/Heme/Allergies:  Does not bruise/bleed easily.            Objective     BP (!) 122/90 (BP Location: Left arm, Patient Position: Sitting, BP Cuff Size: Adult)   Pulse (!) 102   Resp 16   Ht 1.803 m (5' 11\")   Wt (!) 141 kg (310 lb)   SpO2 97%   BMI 43.24 kg/m²     Physical Exam  Vitals reviewed.   Constitutional:       Appearance: He is well-developed. He is obese.   HENT:      Head: Normocephalic and atraumatic.   Eyes:      Pupils: Pupils are equal, round, and reactive to light.   Neck:      Vascular: No JVD.   Cardiovascular:      Rate and Rhythm: Regular rhythm. Tachycardia present.      Heart sounds: Normal heart sounds. No murmur heard.    No friction rub. No gallop.   Pulmonary:      Effort: Pulmonary effort is normal. No respiratory distress.      Breath sounds: Normal breath sounds.   Abdominal:      General: Bowel sounds are normal. There is no distension.      Palpations: Abdomen is soft.   Musculoskeletal:      Right lower leg: No edema.      Left " lower leg: No edema.   Skin:     General: Skin is warm and dry.      Findings: No erythema.   Neurological:      Mental Status: He is alert and oriented to person, place, and time.   Psychiatric:         Behavior: Behavior normal.          Lab Results   Component Value Date/Time    SODIUM 136 08/19/2022 01:22 AM    POTASSIUM 4.7 08/19/2022 01:22 AM    CHLORIDE 102 08/19/2022 01:22 AM    CO2 20 08/19/2022 01:22 AM    GLUCOSE 107 (H) 08/19/2022 01:22 AM    BUN 13 08/19/2022 01:22 AM    CREATININE 1.43 (H) 08/19/2022 01:22 AM     Lab Results   Component Value Date/Time    WBC 11.3 (H) 08/19/2022 01:22 AM    RBC 4.82 08/19/2022 01:22 AM    HEMOGLOBIN 14.2 08/19/2022 01:22 AM    HEMATOCRIT 42.9 08/19/2022 01:22 AM    MCV 89.0 08/19/2022 01:22 AM    MCH 29.5 08/19/2022 01:22 AM    MCHC 33.1 (L) 08/19/2022 01:22 AM    MPV 8.8 (L) 08/19/2022 01:22 AM    NEUTSPOLYS 73.50 (H) 08/19/2022 01:22 AM    LYMPHOCYTES 15.40 (L) 08/19/2022 01:22 AM    MONOCYTES 8.70 08/19/2022 01:22 AM    EOSINOPHILS 1.30 08/19/2022 01:22 AM    BASOPHILS 0.70 08/19/2022 01:22 AM      No results found for: BNPBTYPENAT   No results found for: CRPCARDIAC  No results found for: CHOLSTRLTOT, LDL, HDL, TRIGLYCERIDE    Lab Results   Component Value Date/Time    PROTHROMBTM 16.7 (H) 08/19/2022 01:22 AM    INR 1.38 (H) 08/19/2022 01:22 AM     No results found for: TROPONINI, CKMB      Latest Reference Range & Units Most Recent   NT-proBNP 0 - 125 pg/mL 904 (H)  8/12/22 05:50     Transthoracic echocardiogram (8/14/2022):  Severely reduced left ventricular systolic function.  EF 30%  The right ventricle is dilated with reduced systolic function  Dilated inferior vena cava with inspiratory collapse.  Diastolic function is abnormal, but grade cannot be determined.  Unable to estimate pulmonary artery pressure due to an inadequate   tricuspid regurgitant jet.    Abdomen pelvis CTA (8/12/2022): 1.  No aortic aneurysm or dissection. No significant atherosclerotic  changes or branch artery stenoses.  2.  Decreased/nonenhancement of the posterior inferior aspect of the right kidney with perinephric fatty stranding most consistent with renal infarction. No significant atherosclerotic changes of the right renal artery origin. Small filling defect in the   distal vessel supplying this area could represent a focal dissection or embolus.  3.  Recommend echo for evaluation of central embolic source.  4.  There is retained contrast in areas of the bilateral kidneys on precontrast imaging from prior CT concerning for some degree of renal dysfunction.  5.  Trace left pleural effusion and patchy peripheral left base consolidation again concerning for Covid pneumonia.  Assessment & Plan     1. ACC/AHA stage C systolic heart failure (HCC)  Referral to Vascular Medicine    Basic Metabolic Panel    Cardiac Stress Test Treadmill Only    EC-ECHOCARDIOGRAM LTD W/O CONT    Referral to Pharmacotherapy Service    HEMOGLOBIN A1C (Glycohemoglobin GHB Total/A1C with MBG Estimate)      2. High risk medication use  Referral to Vascular Medicine    Basic Metabolic Panel    Cardiac Stress Test Treadmill Only    EC-ECHOCARDIOGRAM LTD W/O CONT    Referral to Pharmacotherapy Service    HEMOGLOBIN A1C (Glycohemoglobin GHB Total/A1C with MBG Estimate)      3. Chronic systolic congestive heart failure, NYHA class 3 (HCC)  Referral to Vascular Medicine    Basic Metabolic Panel    Cardiac Stress Test Treadmill Only    EC-ECHOCARDIOGRAM LTD W/O CONT    Referral to Pharmacotherapy Service    HEMOGLOBIN A1C (Glycohemoglobin GHB Total/A1C with MBG Estimate)      4. Renal infarct (HCC)  Referral to Vascular Medicine    Basic Metabolic Panel    Cardiac Stress Test Treadmill Only    EC-ECHOCARDIOGRAM LTD W/O CONT    Referral to Pharmacotherapy Service    HEMOGLOBIN A1C (Glycohemoglobin GHB Total/A1C with MBG Estimate)      5. Elevated fasting glucose  Referral to Pharmacotherapy Service    HEMOGLOBIN A1C  (Glycohemoglobin GHB Total/A1C with MBG Estimate)      6. HFrEF (heart failure with reduced ejection fraction) ([facility])  dapagliflozin propanediol (FARXIGA) 10 MG Tab    DISCONTINUED: carvedilol (COREG) 6.25 MG Tab          Medical Decision Making: Today's Assessment/Status/Plan:        HFrEF, Stage C, Class II, LVEF 30%:  -Heart failure due to toxic versus ischemic cardiomyopathy.  Treadmill stress test ordered.  -Discussed Heart failure trajectory and prognosis with patient. Will continue to optimize medical therapy as tolerated. Advanced HF treatment, need for remote monitoring consideration at every visit.  -ACE-I/ARB/ARNI: Losartan 25 mg daily  -Evidence Based Beta-blocker: Resume/demonstrate compliance with Coreg 6.25 mg twice daily  -Aldosterone Antagonist: Held due to recent MATTHIAS  -SGLT2-I: Continue Farxiga 10 mg daily  -Diuretic: Continue Lasix 20 mg daily  -Will continue to closely monitor for side effects of patient's high risk medication(s) including renal function, NTproBNP, and electrolytes as needed  -Close monitoring discussed with patient. Lab work ordered with BMP in 2 weeks  -Repeat Echo in 3 months, if LVEF not >35%, then will discuss/consider ICD for primary Prevention.  -Reinforced s/sx of worsening heart failure with patient and weight monitoring. Pt verbalizes understanding. Pt to call office if present.  -Heart Failure Education: pt to be contacted by HF nurse for further education.  In the meantime, during visit today we discussed indications and use for each medication, importance of treatment adherence, definition of heart failure and potential etiologies for current diagnosis.  -Pharmacotherapy referral placed    Hypertension  -Today in office blood pressure is well controlled.    -Encouraged  home BP monitoring/log.  -Medication recommendations per above.    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  Potential  financial barrier.  We will refer to anticoagulation clinic for consideration of transition to Coumadin if needed.  -Patient referred to vascular medicine for additional recommendations.    FU in clinic in 2 months. Sooner if needed.    Patient verbalizes understanding and agrees with the plan of care.     I personally spent a total of 36 minutes which includes face-to-face time and non-face-to-face time spent on preparing to see the patient, reviewing prior notes and tests, obtaining history from the patient, performing a medically appropriate exam, counseling and educating the patient, ordering medications/tests/procedures/referrals as clinically indicated, and documenting information in the electronic medical record.    SHI Pollard.   [facility]  [phone]    PLEASE NOTE: This Note was created using voice recognition Software. I have made every reasonable attempt to correct obvious errors, but I expect that there are errors of grammar and possibly content that I did not discover before finalizing the note

## 2022-09-26 ENCOUNTER — DOCUMENTATION (OUTPATIENT)
Dept: VASCULAR LAB | Facility: MEDICAL CENTER | Age: 42
End: 2022-09-26

## 2022-09-29 ENCOUNTER — NON-PROVIDER VISIT (OUTPATIENT)
Dept: CARDIOLOGY | Facility: MEDICAL CENTER | Age: 42
End: 2022-09-29
Payer: COMMERCIAL

## 2022-09-29 DIAGNOSIS — I50.20 ACC/AHA STAGE C SYSTOLIC HEART FAILURE (HCC): Primary | ICD-10-CM

## 2022-09-29 PROCEDURE — 99211 OFF/OP EST MAY X REQ PHY/QHP: CPT | Performed by: NURSE PRACTITIONER

## 2022-09-29 RX ORDER — CARVEDILOL 12.5 MG/1
12.5 TABLET ORAL 2 TIMES DAILY WITH MEALS
Qty: 60 TABLET | Refills: 11 | Status: SHIPPED | OUTPATIENT
Start: 2022-09-29 | End: 2022-11-11

## 2022-09-29 NOTE — PROGRESS NOTES
CHF Pharmacotherapy visit - Visit    Date of Service: 09/28/22    Time In: 1100  Time Out: 1130    Informed written consent was given on: 9/29/22    Oracio Jurado is here for CHF     HPI  Pertinent Interval History since last visit:   This is pt's baseline visit  Pending [facility]    Most recent EF:  28%      Current Outpatient Medications:     carvedilol, 12.5 mg, Oral, BID WITH MEALS    losartan, 25 mg, Oral, DAILY    furosemide, 20 mg, Oral, DAILY    dapagliflozin propanediol, 10 mg, Oral, DAILY    apixaban, 5 mg, Oral, BID    Current Adherence to CHF Therapies:  Complete    Current CHF Medications - including dose:   Entresto or ACE/ARB: Losartan 25 mg daily  Beta blocker: Carvedilol 6.25 mg bid  Diuretic: Furosemide 20 mg daily  Aldosterone antagonist: none  SGLT2i: Farxiga 10 mg daily    Home BP and HR:  Does not have BP cuff    Change in weight: Stable    Exercise habits: no regular exercise program - pt limited d/t gout flare    Diet: common adult    SOCIAL HISTORY  Social History     Tobacco Use   Smoking Status Never   Smokeless Tobacco Never        DATA REVIEW  There were no vitals filed for this visit.    No results found for: HBA1C       No results found for: CHOLSTRLTOT, LDL, HDL, TRIGLYCERIDE    Lab Results   Component Value Date/Time    SODIUM 136 08/19/2022 01:22 AM    POTASSIUM 4.7 08/19/2022 01:22 AM    CHLORIDE 102 08/19/2022 01:22 AM    CO2 20 08/19/2022 01:22 AM    GLUCOSE 107 (H) 08/19/2022 01:22 AM    BUN 13 08/19/2022 01:22 AM    CREATININE 1.43 (H) 08/19/2022 01:22 AM     Lab Results   Component Value Date/Time    ALKPHOSPHAT 118 (H) 08/19/2022 01:22 AM    ASTSGOT 21 08/19/2022 01:22 AM    ALTSGPT 34 08/19/2022 01:22 AM    TBILIRUBIN 0.5 08/19/2022 01:22 AM    INR 1.38 (H) 08/19/2022 01:22 AM    ALBUMIN 3.9 08/19/2022 01:22 AM      No components found for: MICROALBUMINCREATRATIOURINE    Renal function:  Calculated creatinine clearance: >100 ml/min     Other Pertinent Blood Work:      Other:  Immunization History   Administered Date(s) Administered    Pneumococcal Conjugate Vaccine (PCV20) 08/15/2022    Tdap Vaccine 04/11/2016     Up to date on pneumococcal vaccine? yes    Recent Imaging Studies:    Recent imaging studies, including ECHO, were available in EMR and reviewed with patient at today's visit      ASSESSMENT AND PLAN  Educated pt on basic pathophysiology and symptom management, as well as the importance of GDMT.  Pt is pending [employer] - he is constantly checking on the status of it  He is tolerating current medications  BP and HR wnl today    Resulting CHF medications today (changes are bolded)  Entresto or ACE/ARB: Losartan 25 mg daily  Beta blocker: Increase carvedilol to 12.5 mg bid  Diuretic: Furosemide 20 mg daily  Aldosterone antagonist: none - consider initiating spironolactone if renal fxn stable  SGLT2i: Farxiga 10 mg daily    Lifestyle Recommendations From Today's Visit:   Continue to limit fluid intake to 2L/day and Na+ intake to 2gm/day  Check weight daily  Get BP cuff and check BP daily    Significant changes to laboratory values since last visit that require repeat labs:  Pending bmp    Blood Work Ordered At Today's visit:   None    Studies Ordered at Todays Visit:  None     Follow-Up:   4 weeks w/ Valentine BROOKS  6 weeks w/ pharmacy    Yumi Casas, PharmD      CC:  Pcp Pt States None  No ref. provider found    Medications reconciled  Flow sheets updated

## 2022-10-25 ASSESSMENT — ENCOUNTER SYMPTOMS
ORTHOPNEA: 0
WEIGHT LOSS: 0
PND: 0
VOMITING: 0
COUGH: 0
TINGLING: 0
BRUISES/BLEEDS EASILY: 0
FEVER: 0
NAUSEA: 0
DIZZINESS: 0
SHORTNESS OF BREATH: 0
BLURRED VISION: 0
FALLS: 0
PALPITATIONS: 0
LOSS OF CONSCIOUSNESS: 0
BLOOD IN STOOL: 0
CLAUDICATION: 0
ABDOMINAL PAIN: 0
MYALGIAS: 0

## 2022-10-26 ENCOUNTER — TELEPHONE (OUTPATIENT)
Dept: CARDIOLOGY | Facility: MEDICAL CENTER | Age: 42
End: 2022-10-26

## 2022-10-26 ENCOUNTER — OFFICE VISIT (OUTPATIENT)
Dept: CARDIOLOGY | Facility: MEDICAL CENTER | Age: 42
End: 2022-10-26
Payer: COMMERCIAL

## 2022-10-26 VITALS
DIASTOLIC BLOOD PRESSURE: 90 MMHG | RESPIRATION RATE: 18 BRPM | WEIGHT: 315 LBS | SYSTOLIC BLOOD PRESSURE: 136 MMHG | BODY MASS INDEX: 44.1 KG/M2 | HEIGHT: 71 IN | OXYGEN SATURATION: 95 % | HEART RATE: 84 BPM

## 2022-10-26 DIAGNOSIS — I50.20 HFREF (HEART FAILURE WITH REDUCED EJECTION FRACTION) (HCC): ICD-10-CM

## 2022-10-26 DIAGNOSIS — I50.22 CHRONIC SYSTOLIC CONGESTIVE HEART FAILURE, NYHA CLASS 3 (HCC): ICD-10-CM

## 2022-10-26 DIAGNOSIS — I50.20 ACC/AHA STAGE C SYSTOLIC HEART FAILURE (HCC): ICD-10-CM

## 2022-10-26 DIAGNOSIS — R06.09 OTHER FORM OF DYSPNEA: ICD-10-CM

## 2022-10-26 DIAGNOSIS — Z79.899 HIGH RISK MEDICATION USE: ICD-10-CM

## 2022-10-26 DIAGNOSIS — N28.0 RENAL INFARCT (HCC): ICD-10-CM

## 2022-10-26 PROCEDURE — 99215 OFFICE O/P EST HI 40 MIN: CPT | Performed by: NURSE PRACTITIONER

## 2022-10-26 RX ORDER — CYCLOBENZAPRINE HCL 10 MG
TABLET ORAL
COMMUNITY
Start: 2022-10-16

## 2022-10-26 RX ORDER — NALOXONE HYDROCHLORIDE 4 MG/.1ML
SPRAY NASAL
COMMUNITY
Start: 2022-10-10 | End: 2022-11-17

## 2022-10-26 RX ORDER — LIDOCAINE 50 MG/G
PATCH TOPICAL
COMMUNITY
Start: 2022-10-16 | End: 2022-11-17

## 2022-10-26 RX ORDER — HYDROCODONE BITARTRATE AND ACETAMINOPHEN 5; 325 MG/1; MG/1
TABLET ORAL
COMMUNITY
Start: 2022-10-16 | End: 2022-11-17

## 2022-10-26 RX ORDER — ACETAMINOPHEN 325 MG/1
TABLET ORAL
COMMUNITY
Start: 2022-10-16 | End: 2022-10-26

## 2022-10-26 RX ORDER — CEFUROXIME AXETIL 500 MG/1
TABLET ORAL
COMMUNITY
Start: 2022-10-16 | End: 2022-10-26

## 2022-10-26 RX ORDER — SACUBITRIL AND VALSARTAN 24; 26 MG/1; MG/1
1 TABLET, FILM COATED ORAL 2 TIMES DAILY
Qty: 180 TABLET | Refills: 3 | Status: SHIPPED | OUTPATIENT
Start: 2022-10-26 | End: 2023-12-07

## 2022-10-26 ASSESSMENT — ENCOUNTER SYMPTOMS
LOSS OF CONSCIOUSNESS: 0
SHORTNESS OF BREATH: 1
NAUSEA: 0
PALPITATIONS: 0
FEVER: 0
PND: 0
COUGH: 0
BRUISES/BLEEDS EASILY: 0
FALLS: 0
DIZZINESS: 0
MYALGIAS: 0
ORTHOPNEA: 0
BLURRED VISION: 0
BLOOD IN STOOL: 0
VOMITING: 0
ABDOMINAL PAIN: 0
CLAUDICATION: 0
TINGLING: 0
WEIGHT LOSS: 0

## 2022-10-26 ASSESSMENT — FIBROSIS 4 INDEX: FIB4 SCORE: 0.29

## 2022-10-26 NOTE — TELEPHONE ENCOUNTER
JG        Caller: Oracio Jurado      Topic/issue: Patient was calling about the sacubitril-valsartan (ENTRESTO) 24-26 MG Tab medication. He was not able to get the medication due to the [facility] pharmacy in [place] requiring a prior authorization from us saying that he could take the medication      Callback Number: [phone]      Thank you    -Bobby BERGER

## 2022-10-26 NOTE — PROGRESS NOTES
Chief Complaint   Patient presents with    CHF (Systolic)     F/V Dx: ACC/AHA stage C systolic heart failure (HCC)       Subjective     rOacio Jurado is a 41 y.o. male who presents today as heart failure follow-up with past medical history of obesity, hypertension, alcohol dependence (in remission).  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  Patient is also followed by pharmacotherapy clinic where he was last seen 9/29/2019.    Since patient was last seen, patient reports he was admitted for pneumonia and MATTHIAS in [facility].  Patient notes he was continuing Lasix 40 mg daily after pharmacist recommended decreasing dose to 20 mg daily.    Today, patient reports he remains limited in his physical mobility due to foot pain and BUSTAMANTE.  Overall, has noted improvement except for recent hospitalization.  Patient has not completed treadmill stress test due to foot pain. Otherwise, denies chest pain, shortness of breath at rest, palpitations, dizziness/lightheadedness, orthopnea, PND or Edema.  Patient reports blood pressure at home controlled 120s-130s/70s to 80s.  Today, based on physical examination findings, patient is euvolemic.  Patient does have diminished lung sounds on auscultation.  No JVD, no pitting edema in bilateral lower extremities, no ascites. Dry weight is 315 lbs.    We will order chemical stress test today.  We will transition patient from losartan to Entresto.  We will continue other GDMT as previously prescribed.  We will obtain BMP in the next 1 to 2 weeks.  Patient to follow-up with vascular next week regarding his Eliquis for history of renal infarction.  Follow-up echocardiogram  pending for next month.  Patient to follow-up with pharmacotherapy 2 weeks.  We will collaborate and reviewed BMP results prior to escalating GDMT.    8/30/2022: Initial 6 minute walk test, patient was able to  "complete 311 m during 6 minute walk test. O2 saturation at baseline was 97% and at the end of the test, the O2 saturation was 99%. Patient reported level 0.5 of dyspnea on Son scale.  Patient was able to walk for 6 minutes.      Past Medical History:   Diagnosis Date    HF (heart failure) (HCC)     Hypertension      History reviewed. No pertinent surgical history.  History reviewed. No pertinent family history.  Social History     Socioeconomic History    Marital status: Single     Spouse name: Not on file    Number of children: Not on file    Years of education: Not on file    Highest education level: Not on file   Occupational History    Not on file   Tobacco Use    Smoking status: Never    Smokeless tobacco: Never   Vaping Use    Vaping Use: Never used   Substance and Sexual Activity    Alcohol use: Yes     Comment: \"sometimes\"    Drug use: Not Currently    Sexual activity: Not on file   Other Topics Concern    Not on file   Social History Narrative    Not on file     Social Determinants of Health     Financial Resource Strain: Not on file   Food Insecurity: Not on file   Transportation Needs: Not on file   Physical Activity: Not on file   Stress: Not on file   Social Connections: Not on file   Intimate Partner Violence: Not on file   Housing Stability: Not on file     No Known Allergies  Outpatient Encounter Medications as of 10/26/2022   Medication Sig Dispense Refill    Naloxone (NARCAN) 4 MG/0.1ML Liquid ADMINISTER A SINGLE SPRAY IN ONE NOSTRIL UPON SIGNS OF OPIOID OVERDOSE. CALL 911. REPEAT AFTER 3 MINUTES IF NO RESPONSE.      lidocaine (LIDODERM) 5 % Patch APPLY ONE PATCH TOPICALLY TO CLEAN, DRY SKIN. LEAVE ON FOR 12 HOURS THEN REMOVE. MUST WAIT AT LEAST 12 HOURS BEFORE APPLYING PATCH(ES) AGAIN.      HYDROcodone-acetaminophen (NORCO) 5-325 MG Tab per tablet TAKE 1 TABLET BY MOUTH EVERY 4 HOURS FOR 5 DAYS AS NEEDED FOR SEVERE PAIN. (7-10) DO NOT EXCEED 4000 MG OF ACETAMINOPHEN PER DAY.      cyclobenzaprine " "(FLEXERIL) 10 mg Tab TAKE 1 TABLET BY MOUTH THREE TIMES DAILY AS NEEDED FOR MUSCLE SPASM FOR 14 DAYS      sacubitril-valsartan (ENTRESTO) 24-26 MG Tab Take 1 Tablet by mouth 2 times a day. 180 Tablet 3    carvedilol (COREG) 12.5 MG Tab Take 1 Tablet by mouth 2 times a day with meals. 60 Tablet 11    furosemide (LASIX) 20 MG Tab Take 1 Tablet by mouth every day. 90 Tablet 3    dapagliflozin propanediol (FARXIGA) 10 MG Tab Take 1 Tablet by mouth every day for 90 days. 90 Tablet 3    apixaban (ELIQUIS) 5mg Tab Take 1 Tablet by mouth 2 times a day. Indications: DVT/PE 60 Tablet 0    [DISCONTINUED] cefUROXime (CEFTIN) 500 MG Tab TAKE 1 TABLET BY MOUTH TWICE DAILY FOR 5 DAYS (Patient not taking: Reported on 10/26/2022)      [DISCONTINUED] acetaminophen (TYLENOL) 325 MG Tab TAKE 2 TABLETS BY MOUTH EVERY 4 HOURS AS NEEDED FOR MILD TO MODERATE PAIN (1-6) FOR 7 DAYS . DO NOT EXCEED 4000 MG PER DAY. (Patient not taking: Reported on 10/26/2022)      [DISCONTINUED] losartan (COZAAR) 25 MG Tab Take 1 Tablet by mouth every day. 90 Tablet 3     No facility-administered encounter medications on file as of 10/26/2022.     Review of Systems   Constitutional:  Negative for fever, malaise/fatigue and weight loss.   Eyes:  Negative for blurred vision.   Respiratory:  Positive for shortness of breath. Negative for cough.    Cardiovascular:  Negative for chest pain, palpitations, orthopnea, claudication, leg swelling and PND.   Gastrointestinal:  Negative for abdominal pain, blood in stool, nausea and vomiting.   Genitourinary:  Negative for dysuria, frequency and hematuria.   Musculoskeletal:  Positive for joint pain. Negative for falls and myalgias.   Neurological:  Negative for dizziness, tingling and loss of consciousness.   Endo/Heme/Allergies:  Does not bruise/bleed easily.            Objective     BP (!) 136/90 (BP Location: Left arm, Patient Position: Sitting, BP Cuff Size: Adult)   Pulse 84   Resp 18   Ht 1.803 m (5' 11\")   Wt " (!) 143 kg (315 lb)   SpO2 95%   BMI 43.93 kg/m²     Physical Exam  Vitals reviewed.   Constitutional:       Appearance: He is well-developed.      Comments: BMI 43   HENT:      Head: Normocephalic and atraumatic.   Eyes:      Pupils: Pupils are equal, round, and reactive to light.   Neck:      Vascular: No JVD.   Cardiovascular:      Rate and Rhythm: Normal rate and regular rhythm.      Heart sounds: Normal heart sounds. No murmur heard.    No friction rub. No gallop.   Pulmonary:      Effort: Pulmonary effort is normal. No respiratory distress.      Breath sounds: Examination of the right-lower field reveals decreased breath sounds. Examination of the left-lower field reveals decreased breath sounds. Decreased breath sounds present.   Abdominal:      General: Bowel sounds are normal. There is no distension.      Palpations: Abdomen is soft.   Musculoskeletal:      Right lower leg: No edema.      Left lower leg: No edema.   Skin:     General: Skin is warm and dry.      Findings: No erythema.   Neurological:      Mental Status: He is alert and oriented to person, place, and time.   Psychiatric:         Behavior: Behavior normal.          Lab Results   Component Value Date/Time    SODIUM 136 08/19/2022 01:22 AM    POTASSIUM 4.7 08/19/2022 01:22 AM    CHLORIDE 102 08/19/2022 01:22 AM    CO2 20 08/19/2022 01:22 AM    GLUCOSE 107 (H) 08/19/2022 01:22 AM    BUN 13 08/19/2022 01:22 AM    CREATININE 1.43 (H) 08/19/2022 01:22 AM     Lab Results   Component Value Date/Time    WBC 11.3 (H) 08/19/2022 01:22 AM    RBC 4.82 08/19/2022 01:22 AM    HEMOGLOBIN 14.2 08/19/2022 01:22 AM    HEMATOCRIT 42.9 08/19/2022 01:22 AM    MCV 89.0 08/19/2022 01:22 AM    MCH 29.5 08/19/2022 01:22 AM    MCHC 33.1 (L) 08/19/2022 01:22 AM    MPV 8.8 (L) 08/19/2022 01:22 AM    NEUTSPOLYS 73.50 (H) 08/19/2022 01:22 AM    LYMPHOCYTES 15.40 (L) 08/19/2022 01:22 AM    MONOCYTES 8.70 08/19/2022 01:22 AM    EOSINOPHILS 1.30 08/19/2022 01:22 AM     BASOPHILS 0.70 08/19/2022 01:22 AM      No results found for: BNPBTYPENAT   No results found for: CRPCARDIAC  No results found for: CHOLSTRLTOT, LDL, HDL, TRIGLYCERIDE    Lab Results   Component Value Date/Time    PROTHROMBTM 16.7 (H) 08/19/2022 01:22 AM    INR 1.38 (H) 08/19/2022 01:22 AM     No results found for: TROPONINI, CKMB      Latest Reference Range & Units Most Recent   NT-proBNP 0 - 125 pg/mL 904 (H)  8/12/22 05:50     Transthoracic echocardiogram (8/14/2022):  Severely reduced left ventricular systolic function.  EF 30%  The right ventricle is dilated with reduced systolic function  Dilated inferior vena cava with inspiratory collapse.  Diastolic function is abnormal, but grade cannot be determined.  Unable to estimate pulmonary artery pressure due to an inadequate   tricuspid regurgitant jet.    Abdomen pelvis CTA (8/12/2022): 1.  No aortic aneurysm or dissection. No significant atherosclerotic changes or branch artery stenoses.  2.  Decreased/nonenhancement of the posterior inferior aspect of the right kidney with perinephric fatty stranding most consistent with renal infarction. No significant atherosclerotic changes of the right renal artery origin. Small filling defect in the   distal vessel supplying this area could represent a focal dissection or embolus.  3.  Recommend echo for evaluation of central embolic source.  4.  There is retained contrast in areas of the bilateral kidneys on precontrast imaging from prior CT concerning for some degree of renal dysfunction.  5.  Trace left pleural effusion and patchy peripheral left base consolidation again concerning for Covid pneumonia.    Assessment & Plan     1. ACC/AHA stage C systolic heart failure (HCC)  [id number] PET W/CT ATTENUATION    Basic Metabolic Panel      2. Renal infarct (HCC)  [id number] PET W/CT ATTENUATION    Basic Metabolic Panel      3. HFrEF (heart failure with reduced ejection fraction) ([facility])  [id number] PET W/CT  ATTENUATION    Basic Metabolic Panel      4. Other form of dyspnea  [id number] PET W/CT ATTENUATION    Basic Metabolic Panel      5. Chronic systolic congestive heart failure, NYHA class 3 (HCC)        6. 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  Stress test ordered.  -Discussed Heart failure trajectory and prognosis with patient. Will continue to optimize medical therapy as tolerated. Advanced HF treatment, need for remote monitoring consideration at every visit.  -ACE-I/ARB/ARNI: Transition to entresto 24/26 BID  -Evidence Based Beta-blocker: Continue Coreg 6.25 mg twice daily  -Aldosterone Antagonist: Held due to recent MATTHIAS  -SGLT2-I: Continue Farxiga 10 mg daily  -Diuretic: Continue Lasix 20 mg daily  -Will continue to closely monitor for side effects of patient's high risk medication(s) including renal function, NTproBNP, and electrolytes as needed  -Close monitoring discussed with patient. Lab work ordered with BMP in 2 weeks  -Repeat Echo next month, if LVEF not >35%, then will discuss/consider ICD for primary Prevention.  -Reinforced s/sx of worsening heart failure with patient and weight monitoring. Pt verbalizes understanding. Pt to call office if present.  -Heart Failure Education: pt to be contacted by HF nurse for further education.  In the meantime, during visit today we discussed indications and use for each medication, importance of treatment adherence, definition of heart failure and potential etiologies for current diagnosis.  -Appreciate pharmacotherapy assistance GDMT optimization.  -Patient requesting disability paperwork due to persistent BUSTAMANTE.  Paperwork completed in office today with end date of April 1, 2023.    Hypertension  -Today in office blood pressure is well controlled.    -Encouraged  home BP monitoring/log.  -Medication  recommendations per above.    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  Potential financial barrier.  We will refer to anticoagulation clinic for consideration of transition to Coumadin if needed.  -Patient referred to vascular medicine for additional recommendations.    FU in clinic in 6 weeks. Sooner if needed.    Patient verbalizes understanding and agrees with the plan of care.     I personally spent a total of 40 minutes which includes face-to-face time and non-face-to-face time spent on preparing to see the patient, reviewing prior notes and tests, obtaining history from the patient, performing a medically appropriate exam, counseling and educating the patient, ordering medications/tests/procedures/referrals as clinically indicated, and documenting information in the electronic medical record.    SERGO Pollard.R.N.   [facility]  [phone]    PLEASE NOTE: This Note was created using voice recognition Software. I have made every reasonable attempt to correct obvious errors, but I expect that there are errors of grammar and possibly content that I did not discover before finalizing the note

## 2022-11-08 ENCOUNTER — TELEPHONE (OUTPATIENT)
Dept: CARDIOLOGY | Facility: MEDICAL CENTER | Age: 42
End: 2022-11-08
Payer: COMMERCIAL

## 2022-11-08 NOTE — TELEPHONE ENCOUNTER
EVARISTO FORTE Key: [id number] - PA Case ID: [id number] - Rx #: [id number] help? Call us at [phone]  Outcome  Approvedtoday  PA has been Approved. PA End Date: 11/08/2023  Drug  Entresto 24-26MG tablets  Form  [employer] Rx Medicaid Electronic PA Form (2017 [place])  Original Claim Info  75

## 2022-11-11 ENCOUNTER — OFFICE VISIT (OUTPATIENT)
Dept: VASCULAR LAB | Facility: MEDICAL CENTER | Age: 42
End: 2022-11-11
Attending: FAMILY MEDICINE
Payer: COMMERCIAL

## 2022-11-11 VITALS
HEIGHT: 71 IN | WEIGHT: 315 LBS | SYSTOLIC BLOOD PRESSURE: 141 MMHG | DIASTOLIC BLOOD PRESSURE: 91 MMHG | BODY MASS INDEX: 44.1 KG/M2 | HEART RATE: 65 BPM

## 2022-11-11 DIAGNOSIS — E66.01 CLASS 3 SEVERE OBESITY DUE TO EXCESS CALORIES WITH SERIOUS COMORBIDITY AND BODY MASS INDEX (BMI) OF 45.0 TO 49.9 IN ADULT (HCC): ICD-10-CM

## 2022-11-11 DIAGNOSIS — I70.0 AORTIC ATHEROSCLEROSIS (HCC): ICD-10-CM

## 2022-11-11 DIAGNOSIS — I50.23 CHF (CONGESTIVE HEART FAILURE), NYHA CLASS II, ACUTE ON CHRONIC, SYSTOLIC (HCC): ICD-10-CM

## 2022-11-11 DIAGNOSIS — N28.0 KIDNEY INFARCTION (HCC): ICD-10-CM

## 2022-11-11 DIAGNOSIS — I10 PRIMARY HYPERTENSION: ICD-10-CM

## 2022-11-11 PROCEDURE — 99204 OFFICE O/P NEW MOD 45 MIN: CPT | Performed by: FAMILY MEDICINE

## 2022-11-11 PROCEDURE — 99212 OFFICE O/P EST SF 10 MIN: CPT

## 2022-11-11 RX ORDER — CARVEDILOL 25 MG/1
25 TABLET ORAL 2 TIMES DAILY WITH MEALS
Qty: 180 TABLET | Refills: 3 | Status: SHIPPED | OUTPATIENT
Start: 2022-11-11 | End: 2023-11-06

## 2022-11-11 ASSESSMENT — ENCOUNTER SYMPTOMS
CLAUDICATION: 0
PALPITATIONS: 0
NAUSEA: 0
CHILLS: 0
BRUISES/BLEEDS EASILY: 0
WEAKNESS: 0
FEVER: 0
ORTHOPNEA: 0
MYALGIAS: 0
COUGH: 0

## 2022-11-11 ASSESSMENT — FIBROSIS 4 INDEX: FIB4 SCORE: 0.29

## 2022-11-11 NOTE — PROGRESS NOTES
INITIAL VASCULAR ANTICOAGULATION VISIT  Oracio Jurado is a 42 y.o. male who presents today 11/11/22 for   Chief Complaint   Patient presents with    Other     NP Dx: ACC/AHA stage C systolic heart failure (HCC)       Initially referred by Valentine Plasencia APRN for length of therapy (LOT) determination and management of anticoagulation in context of acute venothromboembolic disease    Subjective      Renal infarction:  Current no sx of abd or flank pain.  Had eval at [facility] ED 8/2022 for acute flank pain, noted to have wedge-shaped infarct R renal.  Stable renal function.  No indications for revasc at time of admit and started on eliquis.   Partial hypercoag w/u neg  Known cardiomyopathy and active with HF clinic - had echo w/o thrombus.  Pending cardiac PET and initiation of farxiga and entresto.    Denies prior VTE, arterial thrombotic disease, Afib.    Antithrombotic therapy:   Eliquis 5mg BID, started 8/2022, no bleeding/bruising reported      HTN:  Had stopped losartan and furosemide due to MATTHIAS after d/c 8/2022.  Has improved.   Home BP log: not checking, pending purchase of BP cuff   Adherence to current HTN meds: compliant all of the time     HFrEF  Had worsening BUSTAMANTE and found to have LVEF 28% in early 2022.   No current SOB, edema.   No prior MI or ascvd reported   Seeing cardiology HF clinic and pending pharmacotherapy for GDMT maximization   Hx of remote meth, cocaine use.  Has been clean/sober x 10yrs.      Past Medical History:   Diagnosis Date    HF (heart failure) (HCC)     Hypertension      History reviewed. No pertinent surgical history.    Current Outpatient Medications:     carvedilol, 25 mg, Oral, BID WITH MEALS    apixaban, 5 mg, Oral, BID    Naloxone, ADMINISTER A SINGLE SPRAY IN ONE NOSTRIL UPON SIGNS OF OPIOID OVERDOSE. CALL 911. REPEAT AFTER 3 MINUTES IF NO RESPONSE., PRN    lidocaine, APPLY ONE PATCH TOPICALLY TO CLEAN, DRY SKIN. LEAVE ON FOR 12 HOURS THEN REMOVE. MUST WAIT AT LEAST 12  "HOURS BEFORE APPLYING PATCH(ES) AGAIN., Taking    HYDROcodone-acetaminophen, TAKE 1 TABLET BY MOUTH EVERY 4 HOURS FOR 5 DAYS AS NEEDED FOR SEVERE PAIN. (7-10) DO NOT EXCEED 4000 MG OF ACETAMINOPHEN PER DAY., Taking    cyclobenzaprine, TAKE 1 TABLET BY MOUTH THREE TIMES DAILY AS NEEDED FOR MUSCLE SPASM FOR 14 DAYS, Taking    furosemide, 20 mg, Oral, DAILY, Taking    Entresto, 1 Tablet, Oral, BID (Patient not taking: Reported on 11/11/2022), Not Taking    dapagliflozin propanediol, 10 mg, Oral, DAILY (Patient not taking: Reported on 11/11/2022), Not Taking     No Known Allergies    History reviewed. No pertinent family history.  Social History     Tobacco Use    Smoking status: Never    Smokeless tobacco: Never   Vaping Use    Vaping Use: Never used   Substance Use Topics    Alcohol use: Yes     Comment: \"sometimes\"    Drug use: Not Currently       DIET AND EXERCISE:  Weight Change:stable   BMI Readings from Last 5 Encounters:   11/11/22 46.03 kg/m²   10/26/22 43.93 kg/m²   08/30/22 43.24 kg/m²   08/18/22 42.54 kg/m²   08/12/22 43.32 kg/m²     Diet: common adult  Exercise: no regular exercise program     Review of Systems   Constitutional:  Negative for chills and fever.   Respiratory:  Negative for cough.    Cardiovascular:  Negative for chest pain, palpitations, orthopnea, claudication and leg swelling.   Gastrointestinal:  Negative for nausea.   Musculoskeletal:  Negative for myalgias.   Neurological:  Negative for weakness.   Endo/Heme/Allergies:  Does not bruise/bleed easily.         Objective    Vitals:    11/11/22 1313 11/11/22 1322   BP: (!) 145/99 (!) 141/91   BP Location: Left arm Left arm   Patient Position: Sitting Sitting   BP Cuff Size: Large adult Large adult   Pulse: 65 65   Weight: (!) 150 kg (330 lb)    Height: 1.803 m (5' 11\")       BP Readings from Last 5 Encounters:   11/11/22 (!) 141/91   10/26/22 (!) 136/90   08/30/22 (!) 122/90   08/19/22 126/83   08/15/22 (!) (P) 154/101      Body mass index " is 46.03 kg/m².  Physical Exam  Vitals reviewed.   Constitutional:       General: He is not in acute distress.     Appearance: He is well-developed. He is not diaphoretic.   HENT:      Head: Normocephalic and atraumatic.   Neck:      Thyroid: No thyromegaly.      Vascular: No JVD.   Cardiovascular:      Rate and Rhythm: Normal rate and regular rhythm.      Pulses: Normal pulses.      Heart sounds: No murmur heard.  Pulmonary:      Effort: No respiratory distress.      Breath sounds: Normal breath sounds. No wheezing or rales.   Musculoskeletal:         General: No swelling or tenderness.   Neurological:      General: No focal deficit present.      Mental Status: He is oriented to person, place, and time.      Cranial Nerves: No cranial nerve deficit.      Coordination: Coordination normal.   Psychiatric:         Mood and Affect: Mood normal.         Behavior: Behavior normal.           No results found for: LIPOPROTA   No results found for: APOB    Lab Results   Component Value Date    PROTHROMBTM 16.7 (H) 08/19/2022    INR 1.38 (H) 08/19/2022         Lab Results   Component Value Date    SODIUM 136 08/19/2022    POTASSIUM 4.7 08/19/2022    CHLORIDE 102 08/19/2022    CO2 20 08/19/2022    GLUCOSE 107 (H) 08/19/2022    BUN 13 08/19/2022    CREATININE 1.43 (H) 08/19/2022        Lab Results   Component Value Date    WBC 11.3 (H) 08/19/2022    RBC 4.82 08/19/2022    HEMOGLOBIN 14.2 08/19/2022    HEMATOCRIT 42.9 08/19/2022    MCV 89.0 08/19/2022    MCH 29.5 08/19/2022    MCHC 33.1 (L) 08/19/2022    MPV 8.8 (L) 08/19/2022       Latest Reference Range & Units Most Recent   Protein C Functional 83 - 168 % 93  8/12/22 11:15   Protein S-Functional 66 - 143 % 104  8/12/22 11:15   Antithrombin III, Functional 76 - 128 % 95  8/12/22 11:15   Factor Ii Dna Analysis Pt Gene  Negative  8/12/22 11:15   V Leiden Factor  Negative  8/12/22 11:15      Latest Reference Range & Units Most Recent   Uric Acid 2.5 - 8.3 mg/dL 8.4 (H)  8/19/22  01:22   Lipase 11 - 82 U/L 107 (H)  8/11/22 23:36   LDH Total 107 - 266 U/L 831 (H)  8/12/22 04:30   Lactic Acid 0.5 - 2.0 mmol/L 1.6  8/12/22 16:27   Iron 50 - 180 ug/dL 17 (L)  8/13/22 04:31   Total Iron Binding 250 - 450 ug/dL 167 (L)  8/13/22 04:31   % Saturation 15 - 55 % 10 (L)  8/13/22 04:31   Unsat Iron Binding 110 - 370 ug/dL 150  8/13/22 04:31       VASCULAR IMAGING:       Echo 8/2022   Estimated LV Ejection Fraction    28 %       Severely reduced left ventricular systolic function.  The right ventricle is dilated with reduced systolic function  Dilated inferior vena cava with inspiratory collapse.  Diastolic function is abnormal, but grade cannot be determined.  Unable to estimate pulmonary artery pressure due to an inadequate   tricuspid regurgitant jet.    CT abd 8/2022   1.  Irregular areas of enhancement of the right kidney with hazy peripheral fat stranding, appearance suggests changes of bilateral nephritis, correlate with urinalysis. Given somewhat wedge-shaped morphology appearance could represent small areas of   renal infarction.  2.  Peripheral patchy opacity in the left lung base, appearance compatible with infiltrates, consider Covid infiltrates as clinically appropriate.  3.  Small fat-containing right inguinal hernia  4.  Trace left pleural effusion    CTA abd 8/2022   Aorta and vasculature: No dissection or aneurysm in the visualized portion. Significant atherosclerotic changes. No significant stenoses of the major branch vessels. Small filling defect in the distal vessel supplying this area.  1.  No aortic aneurysm or dissection. No significant atherosclerotic changes or branch artery stenoses.  2.  Decreased/nonenhancement of the posterior inferior aspect of the right kidney with perinephric fatty stranding most consistent with renal infarction. No significant atherosclerotic changes of the right renal artery origin. Small filling defect in the   distal vessel supplying this area could  represent a focal dissection or embolus.  3.  Recommend echo for evaluation of central embolic source.  4.  There is retained contrast in areas of the bilateral kidneys on precontrast imaging from prior CT concerning for some degree of renal dysfunction.  5.  Trace left pleural effusion and patchy peripheral left base consolidation again concerning for Covid pneumonia.              Medical Decision Making:  Today's Assessment / Status / Plan:     1. Kidney infarction (HCC)  Renal Function Panel    CBC WITHOUT DIFFERENTIAL    MICROALBUMIN CREAT RATIO URINE    TSH WITH REFLEX TO FT4    URINALYSIS    Lipid Profile    CRP HIGH SENSITIVE (CARDIAC)    Sed Rate    HOMOCYSTEINE    CTA ABDOMEN PELVIS W & W/O POST PROCESS    apixaban (ELIQUIS) 5mg Tab    RIH ZIO PATCH MONITOR      2. Primary hypertension  carvedilol (COREG) 25 MG Tab      3. Severe obesity (BMI >= 40) (HCC)        4. Aortic atherosclerosis (HCC)  CTA ABDOMEN PELVIS W & W/O POST PROCESS      5. CHF (congestive heart failure), NYHA class II, acute on chronic, systolic (HCC)  apixaban (ELIQUIS) 5mg Tab    RIH ZIO PATCH MONITOR        PATIENT TYPE: Secondary Prevention    Etiology of Established CVD if Present:     1) Right renal infarction per CT, 8/2022   Ddx includes thromboembolic vs in situ thrombosis from dissection vs hypercoag state vs other.   Has known dilated CM with HFrEF as potential for source though echo was neg for thrombus.  Has not had Afib r/o to date and denies any other CVA/TIA or arterial disease  Focal dissection with in situ thrombosis still on ddx  Significant aortic athero on CT scan, but no indications of ulcerations or no mention of plaque thickness, but could be potential thromboembolic source  Partial hypercoag w/u neg, but not complete at this time, so will update additional labs   Not revasc candidate at this point, since no major occlusion and renal function stable, will need 6mo OAC, renal function monitoring, and interval  "surveillance x 1yr to monitor for recurrent events   - check additional hypercoag labs  - check CTA at 6mo (Feb), then 1 yr (Aug 2023) from incident event  - monitor renal function   - check zio patch to eval for afib, unlikely needs ILR placement - will work in conjunction with cardiology for monitoring ongoing   - see OAC plan below     2) chronic HFrEF, unclear etiology - stable, no fluid overload  - pending start of SGLT2i and ARNI  - pending functional eval with cardiac PET per cardiology  - ongoing GDMT and pharmacotx evaluation     3) Non-aneurysmal aortic atherosclerosis - \"significant\" per CT, no symptoms or prior known associated clinical manifestations  -Serves as general indicator of systemic atherosclerosis with higher potential atherosclerosis is other vascular beds including coronaries, carotids, cerebral aa.   -Higher overall ASCVD risk and clinical manifestations related to thromboembolic and, less commonly, atheroembolic potential of plaque if becomes complex, >4mm, or associated plaque ulceration or instrumentation of the aorta  - no further imaging surveillance, continue med mgmt      ANTITHROMBOTIC THERAPY:  Date of initiation: 8/2022  HAS-BLED bleeding risk calc ([email or web address]): 0 pts, 0.9%, low risk   Factors to consider for indefinite OAC: n/a   Expected duration: reviewed standard of care is 6mo OAC, then transition to ASA indefinitely unless there is compelling indications for indefinite therapy such as Afib or recurrent arterial vs venous TE events   Eliquis is cost-effective for him  Antithrombotic therapy plan:  - continue eliquis 5mg BID through 2/2023, then transition to ASA 81mg indefinitely   - stressed strict adherence to tx and avoid early termination due to increased risk for recurrent VTE  - Avoid hormonal therapies including estrogen or testosterone-containing meds, or raloxifene or tamoxifene (commonly used for osteoporosis)  - Avoid sedentary periods  - continue complete " avoidance of tobacco products  - if having any invasive procedure,please make sure the doctor knows of your history of blood clots and current anticoagulation status  - avoid Aspirin and anti-inflammatories (eg. Advil, ibuprofen, Aleve, naproxen, etc) while anticoagulated   - if any bleeding lasting 30min without stopping, please seek care with your PCP, urgent care, or ED  - reversal agents for most blood thinners are now available and used if you have major bleeding    LIPID MANAGEMENT:   Qualifies for Statin Therapy Based on 2018 ACC/AHA Guidelines: yes, Primary Prevention - 40-76yo, LDLc >70, <190 w/o DM  The ASCVD Risk score (Pallavi BUSTAMANTE, et al., 2019) failed to calculate., N/A  Major ASCVD events: None  High-risk conditions: N/A  Risk-enhancers: CKD  Currently on Statin: No  Tx goals: LDL-C <100 (consider non-HDL-C <130, apoB <90)  At goal? No loabs   Plan:   - reinforced ongoing TLC measures as noted   - monitor labs prior to next visit   Meds:   - low threshold to start at least moderate-intensity statin due to HF  - review at next visit     BLOOD PRESSURE MANAGEMENT:  ACC/AHA (2017) goal <130/80  Home BP at goal:  no  Office BP at goal:  no  24h ABPM: not ordered to date  Plan:   Monitoring:   - start/continue home BP monitoring, reviewed correct technique, provide BP log and instructions  - order 24h ABPM:  NO  - monitor lytes/gfr routinely   - contact office if BP consistently >140/>90 to discussion of tx adjustments   Medications:  - pending entresto initiation   - increase carvedilol to 25mg BID, verified he needs to stop metoprolol  - continue furosemide 20mg daily   - will likely need spirono in future     GLYCEMIC STATUS:  Normal    LIFESTYLE INTERVENTIONS:    SMOKING:    reports that he has never smoked. He has never used smokeless tobacco.   - continued complete avoidance of all tobacco products     PHYSICAL ACTIVITY: continue healthy activity to improve CV fitness.  In general, targeting  >150min/week of moderate-level activity or as much as tolerated in light of functional status and co-morbidities     WEIGHT MANAGEMENT AND NUTRITION: Mediterranean style dietary approach  and Weight reduction approach - reduce caloric intake by 300kcal/day to achieve 1-2lb weight loss per week       OTHER: none     Instructed to follow-up with PCP for remainder of adult medical needs: yes  We will partner with other providers in the management of established vascular disease and cardiometabolic risk factors.    Studies to Be Obtained: CTA abd/pelvis - 2/2023 - ordered, aprn to track    Labs to Be Obtained: as noted above     Follow up in: Feb    Total time: 65min - chart review/prep, review of other providers' records, imaging/lab review, face-to-face time for history/examination, ordering, prescribing,  review of results/meds/ treatment plan with patient/family/caregiver, documentation in EMR, care coordination (as needed)     Nitin Lugo M.D.  [facility]   [facility]   [phone]

## 2022-11-15 DIAGNOSIS — N28.0 RENAL INFARCT (HCC): ICD-10-CM

## 2022-11-15 DIAGNOSIS — I50.20 HFREF (HEART FAILURE WITH REDUCED EJECTION FRACTION) (HCC): ICD-10-CM

## 2022-11-15 DIAGNOSIS — N28.0 KIDNEY INFARCTION (HCC): ICD-10-CM

## 2022-11-15 DIAGNOSIS — I50.20 ACC/AHA STAGE C SYSTOLIC HEART FAILURE (HCC): ICD-10-CM

## 2022-11-15 DIAGNOSIS — R06.09 OTHER FORM OF DYSPNEA: ICD-10-CM

## 2022-11-15 DIAGNOSIS — M79.89 LEG SWELLING: ICD-10-CM

## 2022-11-16 ENCOUNTER — TELEPHONE (OUTPATIENT)
Dept: CARDIOLOGY | Facility: MEDICAL CENTER | Age: 42
End: 2022-11-16
Payer: COMMERCIAL

## 2022-11-16 DIAGNOSIS — I50.20 ACC/AHA STAGE C SYSTOLIC HEART FAILURE (HCC): ICD-10-CM

## 2022-11-16 DIAGNOSIS — N28.0 RENAL INFARCT (HCC): ICD-10-CM

## 2022-11-16 DIAGNOSIS — I50.22 CHRONIC SYSTOLIC CONGESTIVE HEART FAILURE, NYHA CLASS 3 (HCC): ICD-10-CM

## 2022-11-16 NOTE — TELEPHONE ENCOUNTER
HUSSAIN    Caller:Fatmata Vann    Topic/issue: Need the referral for the Stress test to be re-sent as:   Cristel Scan  0.4 mgs IV -1 time only.    Pt has a broken foot.     Callback Number:  - [phone]    Thank you,   Eugenie ARORA

## 2022-11-17 ENCOUNTER — NON-PROVIDER VISIT (OUTPATIENT)
Dept: CARDIOLOGY | Facility: MEDICAL CENTER | Age: 42
End: 2022-11-17
Payer: COMMERCIAL

## 2022-11-17 VITALS
BODY MASS INDEX: 46.17 KG/M2 | DIASTOLIC BLOOD PRESSURE: 92 MMHG | WEIGHT: 315 LBS | HEART RATE: 66 BPM | SYSTOLIC BLOOD PRESSURE: 138 MMHG

## 2022-11-17 DIAGNOSIS — I50.20 HFREF (HEART FAILURE WITH REDUCED EJECTION FRACTION) (HCC): ICD-10-CM

## 2022-11-17 PROCEDURE — 99211 OFF/OP EST MAY X REQ PHY/QHP: CPT | Performed by: STUDENT IN AN ORGANIZED HEALTH CARE EDUCATION/TRAINING PROGRAM

## 2022-11-17 RX ORDER — DAPAGLIFLOZIN 10 MG/1
10 TABLET, FILM COATED ORAL DAILY
Qty: 90 TABLET | Refills: 1 | Status: SHIPPED | OUTPATIENT
Start: 2022-11-17 | End: 2023-02-15

## 2022-11-17 ASSESSMENT — FIBROSIS 4 INDEX: FIB4 SCORE: 0.29

## 2022-11-17 NOTE — PROGRESS NOTES
CHF Pharmacotherapy visit - Visit    Date of Service: 11/17/22    Time In: 1100  Time Out: 1139    Informed written consent was given on: 9/29/22    Oracio Jurado is here for CHF     HPI  Pertinent Interval History since last visit:   Patient feeling better, off of crutches  Patient now on on Medi-carine    Most recent EF:  28% (8/2022)      Current Outpatient Medications:     carvedilol, 25 mg, Oral, BID WITH MEALS    apixaban, 5 mg, Oral, BID    Naloxone, ADMINISTER A SINGLE SPRAY IN ONE NOSTRIL UPON SIGNS OF OPIOID OVERDOSE. CALL 911. REPEAT AFTER 3 MINUTES IF NO RESPONSE.    lidocaine, APPLY ONE PATCH TOPICALLY TO CLEAN, DRY SKIN. LEAVE ON FOR 12 HOURS THEN REMOVE. MUST WAIT AT LEAST 12 HOURS BEFORE APPLYING PATCH(ES) AGAIN.    HYDROcodone-acetaminophen, TAKE 1 TABLET BY MOUTH EVERY 4 HOURS FOR 5 DAYS AS NEEDED FOR SEVERE PAIN. (7-10) DO NOT EXCEED 4000 MG OF ACETAMINOPHEN PER DAY.    cyclobenzaprine, TAKE 1 TABLET BY MOUTH THREE TIMES DAILY AS NEEDED FOR MUSCLE SPASM FOR 14 DAYS    Entresto, 1 Tablet, Oral, BID (Patient not taking: Reported on 11/11/2022)    furosemide, 20 mg, Oral, DAILY    dapagliflozin propanediol, 10 mg, Oral, DAILY (Patient not taking: Reported on 11/11/2022)    Current Adherence to CHF Therapies:  Complete    Current CHF Medications - including dose:   Entresto or ACE/ARB: Entresto 24/26 mg BID - started a week ago  Beta blocker: Carvedilol 25 mg bid  Diuretic: Furosemide 20 mg daily  Aldosterone antagonist: none - held due to MATTHIAS  SGLT2i: Farxiga 10 mg daily    Home BP and HR:  Does not have BP cuff    Change in weight: Stable    Exercise habits: no regular exercise program - patient starting to walking, starting exercise bike    Diet: common adult, trying to adhere to low sodium    SOCIAL HISTORY  Social History     Tobacco Use   Smoking Status Never   Smokeless Tobacco Never        DATA REVIEW  There were no vitals filed for this visit.    No results found for: HBA1C       No results  found for: CHOLSTRLTOT, LDL, HDL, TRIGLYCERIDE    Lab Results   Component Value Date/Time    SODIUM 136 08/19/2022 01:22 AM    POTASSIUM 4.7 08/19/2022 01:22 AM    CHLORIDE 102 08/19/2022 01:22 AM    CO2 20 08/19/2022 01:22 AM    GLUCOSE 107 (H) 08/19/2022 01:22 AM    BUN 13 08/19/2022 01:22 AM    CREATININE 1.43 (H) 08/19/2022 01:22 AM     Lab Results   Component Value Date/Time    ALKPHOSPHAT 118 (H) 08/19/2022 01:22 AM    ASTSGOT 21 08/19/2022 01:22 AM    ALTSGPT 34 08/19/2022 01:22 AM    TBILIRUBIN 0.5 08/19/2022 01:22 AM    INR 1.38 (H) 08/19/2022 01:22 AM    ALBUMIN 3.9 08/19/2022 01:22 AM      No components found for: MICROALBUMINCREATRATIOURINE    Renal function:  Calculated creatinine clearance: >100 ml/min     Other Pertinent Blood Work:     Other:  Immunization History   Administered Date(s) Administered    Pneumococcal Conjugate Vaccine (PCV20) 08/15/2022    Tdap Vaccine 04/11/2016     Up to date on pneumococcal vaccine? yes    Recent Imaging Studies:    Recent imaging studies, including ECHO, were available in EMR and reviewed with patient at today's visit      ASSESSMENT AND PLAN  Educated pt on basic pathophysiology and symptom management, as well as the importance of GDMT.  Pt is now on medi-carine, prescriptions are covered  He is tolerating current medications without side effects, recently changed losartan to Entresto  BP elevated in office, carvedilol recently increased and patient not checking BP at home. Patient to obtain BP monitor and begin checking BP daily.   Patient pending labs, if improvement to renal function may consider addition of spironolactone and/or increase to Entresto at next visit    Resulting CHF medications today (changes are bolded)  Entresto or ACE/ARB: Entresto 24/26 mg BID - started a week ago  Beta blocker: Carvedilol 25 mg bid  Diuretic: Furosemide 20 mg daily  Aldosterone antagonist: none - held due to MATTHIAS  SGLT2i: Farxiga 10 mg daily    Lifestyle Recommendations From  Today's Visit:   Continue to limit fluid intake to 2L/day and Na+ intake to 2gm/day  Check weight daily  Get BP cuff and check BP daily  Begin exercise 30 minutes daily    Significant changes to laboratory values since last visit that require repeat labs:  Pending bmp    Blood Work Ordered At Today's visit:   Patient will have labs done that were ordered by TODD    Studies Ordered at Todays Visit:  None     Follow-Up:   4 weeks w/ Valentine BROOKS  8 weeks w/ pharmacy    Anthony EcheverriaD      CC:  Valentine Plasencia    Medications reconciled  Flow sheets updated

## 2022-11-18 RX ORDER — BLOOD PRESSURE TEST KIT
1 KIT MISCELLANEOUS ONCE
Qty: 1 KIT | Refills: 0 | Status: SHIPPED | OUTPATIENT
Start: 2022-11-18 | End: 2022-11-18

## 2022-11-21 NOTE — TELEPHONE ENCOUNTER
JG: Ok to order NM-Stress test-Chemical? Patient unable to complete treadmill due to broken foot. Thanks!

## 2022-11-21 NOTE — TELEPHONE ENCOUNTER
Caller:Yun Vann    Topic/issue: They do no do the nuclear stress there, it will need to be done elsewhere.     Callback Number: [phone]    Thank you,    Eugenie ARORA

## 2022-11-21 NOTE — TELEPHONE ENCOUNTER
Order placed.     Routed to [place] unsure if referral is going to [facility] or [facility].

## 2022-11-22 ENCOUNTER — TELEPHONE (OUTPATIENT)
Dept: CARDIOLOGY | Facility: MEDICAL CENTER | Age: 42
End: 2022-11-22
Payer: COMMERCIAL

## 2022-11-22 DIAGNOSIS — R06.09 OTHER FORM OF DYSPNEA: ICD-10-CM

## 2022-11-22 DIAGNOSIS — I50.20 HFREF (HEART FAILURE WITH REDUCED EJECTION FRACTION) (HCC): ICD-10-CM

## 2022-11-22 DIAGNOSIS — N28.0 RENAL INFARCT (HCC): ICD-10-CM

## 2022-11-22 DIAGNOSIS — I50.20 ACC/AHA STAGE C SYSTOLIC HEART FAILURE (HCC): ICD-10-CM

## 2022-11-22 NOTE — TELEPHONE ENCOUNTER
Upon chart review, echo not scheduled at this time.  Task deferred to imaging out of net auth pool to send testing as requested.    Noted telephone note signed by RUFUS LIND 11/21/22

## 2022-11-22 NOTE — TELEPHONE ENCOUNTER
----- Message from OJSE ANGEL Pollard sent at 11/15/2022  2:15 PM PST -----  FU recommendations regarding Dr. Lugo's evaluation. Can you make sure new orders are scheduled appropriately. Echo scheduled; I updated order to complete with bubble study.     Thank you!

## 2022-12-28 ENCOUNTER — TELEPHONE (OUTPATIENT)
Dept: CARDIOLOGY | Facility: MEDICAL CENTER | Age: 42
End: 2022-12-28
Payer: COMMERCIAL

## 2022-12-28 NOTE — TELEPHONE ENCOUNTER
LVM TO SCHEDULED ECHO BUBBLE STUDY. LEFT [phone] NUMBER [facility] [phone] FOR ANY QUESTIONS

## 2022-12-29 ENCOUNTER — TELEPHONE (OUTPATIENT)
Dept: CARDIOLOGY | Facility: MEDICAL CENTER | Age: 42
End: 2022-12-29
Payer: COMMERCIAL

## 2022-12-29 NOTE — TELEPHONE ENCOUNTER
Lvm for patient to contact to schedule echo bubble study. Left [phone] and [phone] to contact.

## 2023-01-17 ENCOUNTER — TELEPHONE (OUTPATIENT)
Dept: VASCULAR LAB | Facility: MEDICAL CENTER | Age: 43
End: 2023-01-17
Payer: COMMERCIAL

## 2023-01-17 NOTE — TELEPHONE ENCOUNTER
Voicemail message sent to pt to remind that they are due for their surveillance vascular imaging.  Scheduling numbers provided.    Sondra OSULLIVAN  [facility]

## 2023-01-19 ENCOUNTER — TELEPHONE (OUTPATIENT)
Dept: VASCULAR LAB | Facility: MEDICAL CENTER | Age: 43
End: 2023-01-19
Payer: COMMERCIAL

## 2023-01-19 NOTE — TELEPHONE ENCOUNTER
Pt missed their pharmacotherapy CHF appointment on 1/19.    LVM to reschedule.     Brandy Joyner, AnthonyD

## 2023-01-20 ENCOUNTER — TELEPHONE (OUTPATIENT)
Dept: CARDIOLOGY | Facility: MEDICAL CENTER | Age: 43
End: 2023-01-20
Payer: COMMERCIAL

## 2023-01-20 NOTE — TELEPHONE ENCOUNTER
Phone Number Called: [phone]    Call outcome: Spoke to patient regarding message below.    Message: Called to inquire if patient has has echocardiogram completed.     Patient reports he had echo completed recently at another facility. RN asked patient to have results faxed to [facility] office. Fax number [phone].     No further questions or concerns at this time.

## 2023-01-20 NOTE — TELEPHONE ENCOUNTER
----- Message from Jenae ABEBE Sriram, Med Ass't sent at 1/6/2023 11:26 AM PST -----  Regarding: RE: good case - renal infarction  Wallace Franklin,    I attempted to contact patient in regard to scheduling an Echo twice now and neither have worked. Patient has a note that he is taking to [place]? Not sure if this is still true??   ----- Message -----  From: Jenae Landeros R.N.  Sent: 12/30/2022  11:50 AM PST  To: Jenae ABEBE Sriram, Med Ass't  Subject: FW: good case - renal infarction                 Wallace Franklin,     Just following up on this patient. Were we able to reach him?  ----- Message -----  From: Jenaepaula Bhatia, Med Ass't  Sent: 12/28/2022   8:15 AM PST  To: Jenaepaula Bhatia Med Ass't, #  Subject: RE: good case - renal infarction                 LVM TO SCHEDULED ECHO BUBBLE STUDY. LEFT [phone] NUMBER ALONG WITH CARDIOLOGY [phone] FOR ANY QUESTIONS    Wallace Franklin,     I noticed a note in the referral that he is taking it to [place]. Not sure if that is still the case? I contacted the patient and left a voicemail. Will attempt to contact again. Documented in the chart.      ----- Message -----  From: Jenae Landeros R.N.  Sent: 12/28/2022   7:57 AM PST  To: Jenaepaula Bhatia, Med Ass't  Subject: FW: good case - renal infarction                 To: Rigoberto    Thank you!  ----- Message -----  From: JOSE ANGEL Pollard  Sent: 12/28/2022   7:38 AM PST  To: Jenae Landeros R.N.  Subject: FW: good case - renal infarction                 I do not see his echocardiogram with bubble study scheduled.  Can you make sure it gets scheduled?  ----- Message -----  From: Nitin Lugo M.D.  Sent: 11/15/2022  10:34 AM PST  To: Michael J Bloch, M.D., #  Subject: RE: good case - renal infarction                 Duplex ordered - MA staff, please contact pt to inform he should get the venous duplex as well.  Thanks     ----- Message -----  From: Michael J Bloch, M.D.  Sent: 11/14/2022   6:53 PM PST  To: Nitin ORTIZ  DEEP Lugo, #  Subject: RE: good case - renal infarction                 Totally agree with your plan Dr. VARGAS.     One other thing to think about is paradoxical embolism especially given his presentation to the er for unexplained ankle swelling a day or two after his d/c. I think a bubble study and a venous duplex would be warranted.     Valentine - I see that you ordered a follow up echo. Can you add bubble study and make sure that his rhythm monitor gets scheduled.     Dr. VARGAS - what do you think about ordering a venous duplex.     mb      ----- Message -----  From: Nitin Lugo M.D.  Sent: 11/11/2022   2:31 PM PST  To: NARENDRA Lam, #  Subject: good case - renal infarction                     Pretty rare but has classic presentation, findings and treatment.      Interestingly LDH >3xULN with normal AST/ALT has high prob of renal infarction - he had it!     Dr. IBARRA - anything else you would recommend?

## 2023-01-25 ENCOUNTER — DOCUMENTATION (OUTPATIENT)
Dept: VASCULAR LAB | Facility: MEDICAL CENTER | Age: 43
End: 2023-01-25
Payer: COMMERCIAL

## 2023-01-25 NOTE — PROGRESS NOTES
LM with patient to c/b to reschedule no-showed pharmacotherapy appt for CHF.  Yunier Gonzalez, PharmD, BCACP

## 2023-01-31 ENCOUNTER — TELEPHONE (OUTPATIENT)
Dept: VASCULAR LAB | Facility: MEDICAL CENTER | Age: 43
End: 2023-01-31
Payer: COMMERCIAL

## 2023-01-31 NOTE — TELEPHONE ENCOUNTER
2nd VM left for pt to remind that they are due for their surveillance vascular imaging.  Scheduling numbers provided.    Sondra OSULLIVAN  [facility]

## 2023-02-08 ENCOUNTER — TELEPHONE (OUTPATIENT)
Dept: VASCULAR LAB | Facility: MEDICAL CENTER | Age: 43
End: 2023-02-08
Payer: COMMERCIAL

## 2023-02-08 NOTE — LETTER
February 8, 2023        Oracio Jurado  [address] [address]        02/08/23    Dear Oracio Jurado ,    We have been unsuccessful in our attempts to contact you regarding your Heart Failure appointments. It is important we are able to monitor your medications for safety and efficacy.     To monitor you effectively, we need to be able to communicate with you.  This is a requirement to be followed by our Service.  If we are unable to contact you on repeated occasions, you are at risk of being discharged from the Heart Failure Clinic.     Please reach out to us to make your next appointment as soon as you are able.  We are open Monday-Friday 8 am until 5 pm.  You may reach our Service at [phone].           Sincerely,           Nitin Talbot, PharmD, [facility]  Clinic Supervisor  [facility]  Outpatient Anticoagulation Service

## 2023-02-08 NOTE — TELEPHONE ENCOUNTER
Pt missed their CHF appointment on 1/19.    Last seen on 11/17/2022    3rd call  LVM to reschedule.    Pt has not responded to multiple attempts/calls to establish care. Will send letter & await pt contact.    Yumi Casas, PharmD

## 2023-02-21 ENCOUNTER — DOCUMENTATION (OUTPATIENT)
Dept: VASCULAR LAB | Facility: MEDICAL CENTER | Age: 43
End: 2023-02-21
Payer: COMMERCIAL

## 2023-02-24 ENCOUNTER — APPOINTMENT (OUTPATIENT)
Dept: VASCULAR LAB | Facility: MEDICAL CENTER | Age: 43
End: 2023-02-24
Payer: COMMERCIAL

## 2023-02-24 ENCOUNTER — TELEPHONE (OUTPATIENT)
Dept: VASCULAR LAB | Facility: MEDICAL CENTER | Age: 43
End: 2023-02-24
Payer: COMMERCIAL

## 2023-02-24 NOTE — TELEPHONE ENCOUNTER
Oracio Rileymata has made a same day cancellation for f/u appt with [facility] clinic     Scheduling staff to contact to reschedule follow-up with any available provider and remind patient to give 24h notice in the future for cancellations.     Appears has not completed imaging as previously ordered as well.     Vascular Medicine Clinic   [facility]   [phone]

## 2023-02-28 ENCOUNTER — DOCUMENTATION (OUTPATIENT)
Dept: VASCULAR LAB | Facility: MEDICAL CENTER | Age: 43
End: 2023-02-28
Payer: COMMERCIAL

## 2023-02-28 NOTE — PROGRESS NOTES
Certified letter sent to patient explaining that multiple calls have been made in attempts to contact them regarding their vascular surveillance studies. Requested patient call clinic to schedule test as failure to obtain these studies in a timely fashion can lead to significant medical consequences. Phone number to clinic provided in correspondence. We will wait for the patient to respond for any future correspondence.     Sondra OSULLIVAN  [facility]

## 2023-02-28 NOTE — LETTER
February 28, 2023        Oracio Jurado  [address] [facility] [address]    We have been unsuccessfully trying to schedule you for vascular surveillance studies. You are due to have CYA abdomen and pelvis, which has been ordered but not yet scheduled.     As we have discussed, these studies are critical in helping us to determine if your vascular disease is progressing. Failure to obtain these studies in a timely fashion can lead to significant medical consequences.     Please call the [facility] scheduling department at [phone] to schedule these tests at your earliest convenience. If for any reason you are unable to schedule, please contact our office at [phone].     Best Regards,     [facility] Vascular Medicine Providers on behalf of   Michael J. Bloch, MD, Medical Director

## 2023-05-04 ENCOUNTER — DOCUMENTATION (OUTPATIENT)
Dept: VASCULAR LAB | Facility: MEDICAL CENTER | Age: 43
End: 2023-05-04
Payer: COMMERCIAL

## 2023-05-04 NOTE — PROGRESS NOTES
Oracio Jurado  2nd consecutive no show for follow-up with the vascular medicine clinic.   Appt notes indicate our clinic is out of network     Patient to contact clinic to reschedule with any available provider.       Patient should be aware of the potential for worsening conditions without appropriate follow-up and monitoring.      Vascular Medicine Clinic   [place] [facility]   [phone]

## 2023-06-08 ENCOUNTER — APPOINTMENT (OUTPATIENT)
Dept: CARDIOLOGY | Facility: MEDICAL CENTER | Age: 43
End: 2023-06-08
Payer: COMMERCIAL

## 2023-12-06 DIAGNOSIS — I50.23 CHF (CONGESTIVE HEART FAILURE), NYHA CLASS II, ACUTE ON CHRONIC, SYSTOLIC (HCC): ICD-10-CM

## 2023-12-06 DIAGNOSIS — I10 PRIMARY HYPERTENSION: ICD-10-CM

## 2023-12-06 RX ORDER — CARVEDILOL 25 MG/1
25 TABLET ORAL 2 TIMES DAILY WITH MEALS
Qty: 180 TABLET | Refills: 0 | OUTPATIENT
Start: 2023-12-06

## 2023-12-07 RX ORDER — SACUBITRIL AND VALSARTAN 24; 26 MG/1; MG/1
1 TABLET, FILM COATED ORAL 2 TIMES DAILY
Qty: 180 TABLET | Refills: 0 | Status: SHIPPED | OUTPATIENT
Start: 2023-12-07

## 2023-12-07 NOTE — TELEPHONE ENCOUNTER
Is the patient due for a refill? Yes    Was the patient seen the past year? No    Date of last office visit: 10/26/22    Does the patient have an upcoming appointment?  No    Provider to refill:HUSSAIN    Does the patients insurance require a 100 day supply?  No

## 2025-01-01 ENCOUNTER — HOSPITAL ENCOUNTER (EMERGENCY)
Dept: HOSPITAL 94 - ER | Age: 45
LOS: 1 days | Discharge: HOME | End: 2025-01-02
Payer: MEDICAID

## 2025-01-01 VITALS — TEMPERATURE: 97.6 F

## 2025-01-01 VITALS — HEIGHT: 72 IN | WEIGHT: 315 LBS | BODY MASS INDEX: 42.66 KG/M2
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
## 2025-01-01 DIAGNOSIS — E11.9: ICD-10-CM

## 2025-01-01 DIAGNOSIS — M25.562: ICD-10-CM

## 2025-01-01 DIAGNOSIS — R60.0: ICD-10-CM

## 2025-01-01 DIAGNOSIS — I50.9: ICD-10-CM

## 2025-01-01 DIAGNOSIS — Z79.899: ICD-10-CM
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
## 2025-01-01 DIAGNOSIS — S82.152A: Primary | ICD-10-CM

## 2025-01-01 LAB
ALBUMIN SERPL BCP-MCNC: 3.6 G/DL (ref 3.4–5)
ALBUMIN/GLOB SERPL: 0.8 {RATIO} (ref 1.1–1.5)
ALP SERPL-CCNC: 87 IU/L (ref 46–116)
ALT SERPL W P-5'-P-CCNC: 23 U/L (ref 12–78)
ANION GAP SERPL CALCULATED.3IONS-SCNC: 10 MMOL/L (ref 8–16)
ANISOCYTOSIS BLD QL SMEAR: (no result)
AST SERPL W P-5'-P-CCNC: 26 U/L (ref 10–37)
BASOPHILS # BLD AUTO: 0 X10'3 (ref 0–0.2)
BASOPHILS NFR BLD AUTO: 0.5 % (ref 0–1)
BILIRUB SERPL-MCNC: 1.1 MG/DL (ref 0.1–1)
BUN SERPL-MCNC: 19 MG/DL (ref 7–18)
BUN/CREAT SERPL: 14.4 (ref 10–20)
CALCIUM SERPL-MCNC: 8.3 MG/DL (ref 8.5–10.1)
CHLORIDE SERPL-SCNC: 102 MMOL/L (ref 99–107)
CO2 SERPL-SCNC: 23.5 MMOL/L (ref 24–32)
CREAT CL PREDICTED SERPL C-G-VRATE: 77 ML/MIN
CREAT SERPL-MCNC: 1.32 MG/DL (ref 0.6–1.1)
CRP SERPL HS-MCNC: 5.4 MG/DL (ref 0–0.5)
EOSINOPHIL # BLD AUTO: 0.2 X10'3 (ref 0–0.9)
EOSINOPHIL NFR BLD AUTO: 2.1 % (ref 0–6)
ERYTHROCYTE [DISTWIDTH] IN BLOOD BY AUTOMATED COUNT: 18.6 % (ref 11.5–14.5)
GFR SERPL CREATININE-BSD FRML MDRD: 71 ML/MIN
GLOBULIN SER CALC-MCNC: 4.3 G/DL (ref 2.7–4.3)
GLUCOSE SERPL-MCNC: 144 MG/DL (ref 70–104)
HBA1C MFR BLD: 5.9 % (ref 4.5–6.2)
HCT VFR BLD AUTO: 39.4 % (ref 42–52)
HGB BLD-MCNC: 13.6 G/DL (ref 14–17.9)
LYMPHOCYTES # BLD AUTO: 1.1 X10'3 (ref 1.1–4.8)
LYMPHOCYTES NFR BLD AUTO: 12.2 % (ref 21–51)
MCH RBC QN AUTO: 32.1 PG (ref 27–31)
MCHC RBC AUTO-ENTMCNC: 34.6 G/DL (ref 33–36.5)
MCV RBC AUTO: 92.8 FL (ref 78–98)
MONOCYTES # BLD AUTO: 0.6 X10'3 (ref 0–0.9)
MONOCYTES NFR BLD AUTO: 6.8 % (ref 2–12)
NEUTROPHILS # BLD AUTO: 7.2 X10'3 (ref 1.8–7.7)
NEUTROPHILS NFR BLD AUTO: 78.4 % (ref 42–75)
NT-PROBNP SERPL-MCNC: 38 PG/ML (ref 0–125)
PLATELET # BLD AUTO: 208 X10'3 (ref 140–440)
PMV BLD AUTO: 7 FL (ref 7.4–10.4)
POTASSIUM SERPL-SCNC: 4.8 MMOL/L (ref 3.5–5.1)
PROT SERPL-MCNC: 7.9 G/DL (ref 6.4–8.2)
RBC # BLD AUTO: 4.25 X10'6 (ref 4.7–6.1)
RBC MORPH BLD: (no result)
SODIUM SERPL-SCNC: 135 MMOL/L (ref 135–145)
URATE SERPL-MCNC: 7.7 MG/DL (ref 3.5–7.2)
WBC # BLD AUTO: 9.1 X10'3 (ref 4.5–11)

## 2025-01-01 PROCEDURE — 74177 CT ABD & PELVIS W/CONTRAST: CPT

## 2025-01-01 PROCEDURE — 73564 X-RAY EXAM KNEE 4 OR MORE: CPT

## 2025-01-01 PROCEDURE — 36415 COLL VENOUS BLD VENIPUNCTURE: CPT

## 2025-01-01 PROCEDURE — 96372 THER/PROPH/DIAG INJ SC/IM: CPT

## 2025-01-01 PROCEDURE — 80053 COMPREHEN METABOLIC PANEL: CPT

## 2025-01-01 PROCEDURE — 85008 BL SMEAR W/O DIFF WBC COUNT: CPT

## 2025-01-01 PROCEDURE — 73701 CT LOWER EXTREMITY W/DYE: CPT

## 2025-01-01 PROCEDURE — 71045 X-RAY EXAM CHEST 1 VIEW: CPT

## 2025-01-01 PROCEDURE — 84484 ASSAY OF TROPONIN QUANT: CPT

## 2025-01-01 PROCEDURE — 96374 THER/PROPH/DIAG INJ IV PUSH: CPT

## 2025-01-01 PROCEDURE — 86140 C-REACTIVE PROTEIN: CPT

## 2025-01-01 PROCEDURE — 93005 ELECTROCARDIOGRAM TRACING: CPT

## 2025-01-01 PROCEDURE — 83880 ASSAY OF NATRIURETIC PEPTIDE: CPT

## 2025-01-01 PROCEDURE — 81003 URINALYSIS AUTO W/O SCOPE: CPT

## 2025-01-01 PROCEDURE — 85651 RBC SED RATE NONAUTOMATED: CPT

## 2025-01-01 PROCEDURE — 83036 HEMOGLOBIN GLYCOSYLATED A1C: CPT

## 2025-01-01 PROCEDURE — 99285 EMERGENCY DEPT VISIT HI MDM: CPT

## 2025-01-01 PROCEDURE — 85025 COMPLETE CBC W/AUTO DIFF WBC: CPT

## 2025-01-01 PROCEDURE — 84550 ASSAY OF BLOOD/URIC ACID: CPT

## 2025-01-01 RX ADMIN — KETOROLAC TROMETHAMINE ONE MG: 15 INJECTION, SOLUTION INTRAMUSCULAR; INTRAVENOUS at 22:35

## 2025-01-02 VITALS
SYSTOLIC BLOOD PRESSURE: 150 MMHG | RESPIRATION RATE: 16 BRPM | HEART RATE: 83 BPM | OXYGEN SATURATION: 99 % | DIASTOLIC BLOOD PRESSURE: 81 MMHG

## 2025-01-02 LAB
BILIRUB UR QL STRIP: NEGATIVE
CLARITY UR: CLEAR
COLOR UR: YELLOW
GLUCOSE UR STRIP-MCNC: NEGATIVE MG/DL
HGB UR QL STRIP: NEGATIVE
KETONES UR STRIP-MCNC: NEGATIVE MG/DL
LEUKOCYTE ESTERASE UR QL STRIP: NEGATIVE
NITRITE UR QL STRIP: NEGATIVE
PH UR STRIP: 6 [PH] (ref 4.8–8)
PROT UR QL STRIP: NEGATIVE MG/DL
SP GR UR STRIP: <=1.005 (ref 1–1.03)
URN COLLECT METHOD CLASS: (no result)
UROBILINOGEN UR STRIP-MCNC: 1 E.U/DL (ref 0.2–1)

## 2025-01-05 ENCOUNTER — APPOINTMENT (OUTPATIENT)
Dept: RADIOLOGY | Facility: MEDICAL CENTER | Age: 45
DRG: 176 | End: 2025-01-05
Attending: EMERGENCY MEDICINE
Payer: COMMERCIAL

## 2025-01-05 ENCOUNTER — APPOINTMENT (OUTPATIENT)
Dept: RADIOLOGY | Facility: MEDICAL CENTER | Age: 45
DRG: 176 | End: 2025-01-05
Attending: INTERNAL MEDICINE
Payer: COMMERCIAL

## 2025-01-05 ENCOUNTER — HOSPITAL ENCOUNTER (INPATIENT)
Facility: MEDICAL CENTER | Age: 45
LOS: 4 days | DRG: 176 | End: 2025-01-09
Attending: EMERGENCY MEDICINE | Admitting: INTERNAL MEDICINE
Payer: COMMERCIAL

## 2025-01-05 DIAGNOSIS — R09.02 HYPOXIA: ICD-10-CM

## 2025-01-05 DIAGNOSIS — R73.9 HYPERGLYCEMIA: ICD-10-CM

## 2025-01-05 DIAGNOSIS — M10.9 ACUTE GOUT OF RIGHT ELBOW, UNSPECIFIED CAUSE: ICD-10-CM

## 2025-01-05 DIAGNOSIS — R06.00 DYSPNEA, UNSPECIFIED TYPE: ICD-10-CM

## 2025-01-05 PROBLEM — F14.10 COCAINE ABUSE (HCC): Status: ACTIVE | Noted: 2025-01-05

## 2025-01-05 PROBLEM — I26.99 BILATERAL PULMONARY EMBOLISM (HCC): Status: ACTIVE | Noted: 2025-01-05

## 2025-01-05 LAB
ALBUMIN SERPL BCP-MCNC: 3.8 G/DL (ref 3.2–4.9)
ALBUMIN/GLOB SERPL: 1.3 G/DL
ALP SERPL-CCNC: 74 U/L (ref 30–99)
ALT SERPL-CCNC: 18 U/L (ref 2–50)
ANION GAP SERPL CALC-SCNC: 11 MMOL/L (ref 7–16)
APTT PPP: 20.1 SEC (ref 24.7–36)
AST SERPL-CCNC: 26 U/L (ref 12–45)
BASOPHILS # BLD AUTO: 0.4 % (ref 0–1.8)
BASOPHILS # BLD: 0.02 K/UL (ref 0–0.12)
BILIRUB SERPL-MCNC: 0.7 MG/DL (ref 0.1–1.5)
BUN SERPL-MCNC: 14 MG/DL (ref 8–22)
CALCIUM ALBUM COR SERPL-MCNC: 9.2 MG/DL (ref 8.5–10.5)
CALCIUM SERPL-MCNC: 9 MG/DL (ref 8.5–10.5)
CHLORIDE SERPL-SCNC: 107 MMOL/L (ref 96–112)
CK SERPL-CCNC: 324 U/L (ref 0–154)
CO2 SERPL-SCNC: 20 MMOL/L (ref 20–33)
CREAT SERPL-MCNC: 1.27 MG/DL (ref 0.5–1.4)
D DIMER PPP IA.FEU-MCNC: 6.96 UG/ML (FEU) (ref 0–0.5)
EKG IMPRESSION: NORMAL
EOSINOPHIL # BLD AUTO: 0.17 K/UL (ref 0–0.51)
EOSINOPHIL NFR BLD: 3 % (ref 0–6.9)
ERYTHROCYTE [DISTWIDTH] IN BLOOD BY AUTOMATED COUNT: 47.5 FL (ref 35.9–50)
GFR SERPLBLD CREATININE-BSD FMLA CKD-EPI: 70 ML/MIN/1.73 M 2
GLOBULIN SER CALC-MCNC: 3 G/DL (ref 1.9–3.5)
GLUCOSE BLD STRIP.AUTO-MCNC: 103 MG/DL (ref 65–99)
GLUCOSE SERPL-MCNC: 145 MG/DL (ref 65–99)
HCT VFR BLD AUTO: 36.8 % (ref 42–52)
HGB BLD-MCNC: 12.7 G/DL (ref 14–18)
IMM GRANULOCYTES # BLD AUTO: 0.02 K/UL (ref 0–0.11)
IMM GRANULOCYTES NFR BLD AUTO: 0.4 % (ref 0–0.9)
INR PPP: 1.04 (ref 0.87–1.13)
LYMPHOCYTES # BLD AUTO: 1.04 K/UL (ref 1–4.8)
LYMPHOCYTES NFR BLD: 18.5 % (ref 22–41)
MCH RBC QN AUTO: 30.5 PG (ref 27–33)
MCHC RBC AUTO-ENTMCNC: 34.5 G/DL (ref 32.3–36.5)
MCV RBC AUTO: 88.5 FL (ref 81.4–97.8)
MONOCYTES # BLD AUTO: 0.4 K/UL (ref 0–0.85)
MONOCYTES NFR BLD AUTO: 7.1 % (ref 0–13.4)
NEUTROPHILS # BLD AUTO: 3.98 K/UL (ref 1.82–7.42)
NEUTROPHILS NFR BLD: 70.6 % (ref 44–72)
NRBC # BLD AUTO: 0 K/UL
NRBC BLD-RTO: 0 /100 WBC (ref 0–0.2)
NT-PROBNP SERPL IA-MCNC: 389 PG/ML (ref 0–125)
PLATELET # BLD AUTO: 192 K/UL (ref 164–446)
PMV BLD AUTO: 9.2 FL (ref 9–12.9)
POTASSIUM SERPL-SCNC: 3.7 MMOL/L (ref 3.6–5.5)
PROT SERPL-MCNC: 6.8 G/DL (ref 6–8.2)
PROTHROMBIN TIME: 13.7 SEC (ref 12–14.6)
RBC # BLD AUTO: 4.16 M/UL (ref 4.7–6.1)
SODIUM SERPL-SCNC: 138 MMOL/L (ref 135–145)
TROPONIN T SERPL-MCNC: 9 NG/L (ref 6–19)
UFH PPP CHRO-ACNC: <0.1 IU/ML
WBC # BLD AUTO: 5.6 K/UL (ref 4.8–10.8)

## 2025-01-05 PROCEDURE — 84484 ASSAY OF TROPONIN QUANT: CPT

## 2025-01-05 PROCEDURE — 71045 X-RAY EXAM CHEST 1 VIEW: CPT

## 2025-01-05 PROCEDURE — 99223 1ST HOSP IP/OBS HIGH 75: CPT | Performed by: INTERNAL MEDICINE

## 2025-01-05 PROCEDURE — 83880 ASSAY OF NATRIURETIC PEPTIDE: CPT

## 2025-01-05 PROCEDURE — 93005 ELECTROCARDIOGRAM TRACING: CPT | Mod: TC

## 2025-01-05 PROCEDURE — 700117 HCHG RX CONTRAST REV CODE 255: Performed by: EMERGENCY MEDICINE

## 2025-01-05 PROCEDURE — 770020 HCHG ROOM/CARE - TELE (206)

## 2025-01-05 PROCEDURE — 93970 EXTREMITY STUDY: CPT

## 2025-01-05 PROCEDURE — 82550 ASSAY OF CK (CPK): CPT

## 2025-01-05 PROCEDURE — 96372 THER/PROPH/DIAG INJ SC/IM: CPT

## 2025-01-05 PROCEDURE — 71275 CT ANGIOGRAPHY CHEST: CPT

## 2025-01-05 PROCEDURE — 700111 HCHG RX REV CODE 636 W/ 250 OVERRIDE (IP): Performed by: INTERNAL MEDICINE

## 2025-01-05 PROCEDURE — 74177 CT ABD & PELVIS W/CONTRAST: CPT

## 2025-01-05 PROCEDURE — 36415 COLL VENOUS BLD VENIPUNCTURE: CPT

## 2025-01-05 PROCEDURE — 85610 PROTHROMBIN TIME: CPT

## 2025-01-05 PROCEDURE — 700102 HCHG RX REV CODE 250 W/ 637 OVERRIDE(OP): Performed by: INTERNAL MEDICINE

## 2025-01-05 PROCEDURE — 93005 ELECTROCARDIOGRAM TRACING: CPT | Mod: TC | Performed by: EMERGENCY MEDICINE

## 2025-01-05 PROCEDURE — 99285 EMERGENCY DEPT VISIT HI MDM: CPT

## 2025-01-05 PROCEDURE — 85379 FIBRIN DEGRADATION QUANT: CPT

## 2025-01-05 PROCEDURE — 85025 COMPLETE CBC W/AUTO DIFF WBC: CPT

## 2025-01-05 PROCEDURE — 80053 COMPREHEN METABOLIC PANEL: CPT

## 2025-01-05 PROCEDURE — 85730 THROMBOPLASTIN TIME PARTIAL: CPT

## 2025-01-05 PROCEDURE — 85520 HEPARIN ASSAY: CPT

## 2025-01-05 PROCEDURE — 82962 GLUCOSE BLOOD TEST: CPT

## 2025-01-05 RX ORDER — DEXTROSE MONOHYDRATE 25 G/50ML
25 INJECTION, SOLUTION INTRAVENOUS
Status: DISCONTINUED | OUTPATIENT
Start: 2025-01-05 | End: 2025-01-06

## 2025-01-05 RX ORDER — HEPARIN SODIUM 5000 [USP'U]/100ML
0-30 INJECTION, SOLUTION INTRAVENOUS CONTINUOUS
Status: DISCONTINUED | OUTPATIENT
Start: 2025-01-05 | End: 2025-01-05

## 2025-01-05 RX ORDER — HYDRALAZINE HYDROCHLORIDE 20 MG/ML
10 INJECTION INTRAMUSCULAR; INTRAVENOUS EVERY 4 HOURS PRN
Status: DISCONTINUED | OUTPATIENT
Start: 2025-01-05 | End: 2025-01-09 | Stop reason: HOSPADM

## 2025-01-05 RX ORDER — PROMETHAZINE HYDROCHLORIDE 25 MG/1
12.5-25 TABLET ORAL EVERY 4 HOURS PRN
Status: DISCONTINUED | OUTPATIENT
Start: 2025-01-05 | End: 2025-01-05

## 2025-01-05 RX ORDER — INDOMETHACIN 50 MG/1
50 CAPSULE ORAL EVERY 8 HOURS PRN
Status: ON HOLD | COMMUNITY

## 2025-01-05 RX ORDER — HEPARIN SODIUM 1000 [USP'U]/ML
40 INJECTION, SOLUTION INTRAVENOUS; SUBCUTANEOUS PRN
Status: DISCONTINUED | OUTPATIENT
Start: 2025-01-05 | End: 2025-01-05

## 2025-01-05 RX ORDER — HEPARIN SODIUM 1000 [USP'U]/ML
80 INJECTION, SOLUTION INTRAVENOUS; SUBCUTANEOUS ONCE
Status: DISCONTINUED | OUTPATIENT
Start: 2025-01-05 | End: 2025-01-05

## 2025-01-05 RX ORDER — CARVEDILOL 6.25 MG/1
6.25 TABLET ORAL 2 TIMES DAILY WITH MEALS
Status: ON HOLD | COMMUNITY

## 2025-01-05 RX ORDER — ONDANSETRON 4 MG/1
4 TABLET, ORALLY DISINTEGRATING ORAL EVERY 4 HOURS PRN
Status: DISCONTINUED | OUTPATIENT
Start: 2025-01-05 | End: 2025-01-05

## 2025-01-05 RX ORDER — PROMETHAZINE HYDROCHLORIDE 25 MG/1
12.5-25 SUPPOSITORY RECTAL EVERY 4 HOURS PRN
Status: DISCONTINUED | OUTPATIENT
Start: 2025-01-05 | End: 2025-01-05

## 2025-01-05 RX ORDER — GABAPENTIN 100 MG/1
100 CAPSULE ORAL 3 TIMES DAILY
Status: ON HOLD | COMMUNITY
Start: 2024-11-27 | End: 2025-01-09

## 2025-01-05 RX ORDER — ONDANSETRON 2 MG/ML
4 INJECTION INTRAMUSCULAR; INTRAVENOUS EVERY 4 HOURS PRN
Status: DISCONTINUED | OUTPATIENT
Start: 2025-01-05 | End: 2025-01-05

## 2025-01-05 RX ORDER — ACETAMINOPHEN 325 MG/1
650 TABLET ORAL EVERY 6 HOURS PRN
Status: DISCONTINUED | OUTPATIENT
Start: 2025-01-05 | End: 2025-01-09 | Stop reason: HOSPADM

## 2025-01-05 RX ORDER — ENOXAPARIN SODIUM 150 MG/ML
150 INJECTION SUBCUTANEOUS EVERY 12 HOURS
Status: DISCONTINUED | OUTPATIENT
Start: 2025-01-05 | End: 2025-01-06

## 2025-01-05 RX ORDER — INSULIN LISPRO 100 [IU]/ML
1-6 INJECTION, SOLUTION INTRAVENOUS; SUBCUTANEOUS
Status: DISCONTINUED | OUTPATIENT
Start: 2025-01-05 | End: 2025-01-06

## 2025-01-05 RX ORDER — PROCHLORPERAZINE EDISYLATE 5 MG/ML
5-10 INJECTION INTRAMUSCULAR; INTRAVENOUS EVERY 4 HOURS PRN
Status: DISCONTINUED | OUTPATIENT
Start: 2025-01-05 | End: 2025-01-05

## 2025-01-05 RX ADMIN — IOHEXOL 100 ML: 350 INJECTION, SOLUTION INTRAVENOUS at 16:15

## 2025-01-05 RX ADMIN — ENOXAPARIN SODIUM 150 MG: 150 INJECTION SUBCUTANEOUS at 18:01

## 2025-01-05 ASSESSMENT — LIFESTYLE VARIABLES
ALCOHOL_USE: NO
ON A TYPICAL DAY WHEN YOU DRINK ALCOHOL HOW MANY DRINKS DO YOU HAVE: 0
HOW MANY TIMES IN THE PAST YEAR HAVE YOU HAD 5 OR MORE DRINKS IN A DAY: 0
TOTAL SCORE: 0
TOTAL SCORE: 0
HAVE PEOPLE ANNOYED YOU BY CRITICIZING YOUR DRINKING: NO
SUBSTANCE_ABUSE: 0
CONSUMPTION TOTAL: NEGATIVE
TOTAL SCORE: 0
EVER HAD A DRINK FIRST THING IN THE MORNING TO STEADY YOUR NERVES TO GET RID OF A HANGOVER: NO
AVERAGE NUMBER OF DAYS PER WEEK YOU HAVE A DRINK CONTAINING ALCOHOL: 0
HAVE YOU EVER FELT YOU SHOULD CUT DOWN ON YOUR DRINKING: NO
EVER FELT BAD OR GUILTY ABOUT YOUR DRINKING: NO

## 2025-01-05 ASSESSMENT — COGNITIVE AND FUNCTIONAL STATUS - GENERAL
STANDING UP FROM CHAIR USING ARMS: A LOT
CLIMB 3 TO 5 STEPS WITH RAILING: A LOT
WALKING IN HOSPITAL ROOM: A LOT
MOVING FROM LYING ON BACK TO SITTING ON SIDE OF FLAT BED: A LOT
DAILY ACTIVITIY SCORE: 19
SUGGESTED CMS G CODE MODIFIER DAILY ACTIVITY: CK
EATING MEALS: A LITTLE
MOVING TO AND FROM BED TO CHAIR: A LOT
DRESSING REGULAR UPPER BODY CLOTHING: A LITTLE
TURNING FROM BACK TO SIDE WHILE IN FLAT BAD: A LOT
HELP NEEDED FOR BATHING: A LITTLE
PERSONAL GROOMING: A LITTLE
SUGGESTED CMS G CODE MODIFIER MOBILITY: CL
TOILETING: A LITTLE
MOBILITY SCORE: 12

## 2025-01-05 ASSESSMENT — ENCOUNTER SYMPTOMS
CONSTIPATION: 0
VOMITING: 0
WEAKNESS: 1
SPEECH CHANGE: 0
TINGLING: 0
PALPITATIONS: 0
COUGH: 0
PHOTOPHOBIA: 0
ABDOMINAL PAIN: 0
TREMORS: 0
WEIGHT LOSS: 0
BACK PAIN: 0
SPUTUM PRODUCTION: 0
HEADACHES: 0
FOCAL WEAKNESS: 0
BLURRED VISION: 0
SENSORY CHANGE: 0
DIARRHEA: 0
DIZZINESS: 0
MYALGIAS: 0
ORTHOPNEA: 0
NAUSEA: 0
FEVER: 0
CHILLS: 0
NECK PAIN: 0
SHORTNESS OF BREATH: 1
DOUBLE VISION: 0
HALLUCINATIONS: 0
EYE PAIN: 0

## 2025-01-05 ASSESSMENT — PATIENT HEALTH QUESTIONNAIRE - PHQ9
SUM OF ALL RESPONSES TO PHQ9 QUESTIONS 1 AND 2: 0
1. LITTLE INTEREST OR PLEASURE IN DOING THINGS: NOT AT ALL
2. FEELING DOWN, DEPRESSED, IRRITABLE, OR HOPELESS: NOT AT ALL

## 2025-01-05 ASSESSMENT — PAIN DESCRIPTION - PAIN TYPE: TYPE: ACUTE PAIN

## 2025-01-05 NOTE — ED PROVIDER NOTES
"  CHIEF COMPLAINT  Chief Complaint   Patient presents with    Leg Swelling     BLE swelling X 2 wks  Hx: CHF    Shortness of Breath     Pt states, \"It feels like my chest is being crushed.     LIMITATION TO HISTORY   None    HPI    Oracio Jurado is a 44 y.o. male who presents to the ED for leg swelling and shortness of breath onset prior to arrival. He endorses chest pressure, leg and feet swelling and difficulty walking.     This has all happened over the past two weeks.  Despite admission to [facility] overnight and another ER visit the patient is still continues have pain.    Patient is here with friends.  Patient is brought from [facility].  They state that that this point they are not sure what the etiology is.    Friend at bedside states she has a friend who is a nurse and thinks the patient may have Carol-[place] syndrome.    Friend states that this all happened once patient took an Ozempic.    His chest pain is described as \"my chest is being crushed\". In his legs he has sensations of tightness and that he \"cannot move, my legs are squeezed\". Due to this he has been completely unable to walk, cannot even make it to the bathroom. This first started with numbness and tingling in his toes. He has been seen at a few other hospitals for his symptoms but he has not been diagnosed. He was admitted at an ER in [place] but no diagnosis was given, was told he possibly had gout. He has been taking Gabapentin and indomethacin for pain. He has a history of heart failure and takes Lasix as prescribed for this. Friend notes that recently he started taking Ozempic.     OUTSIDE HISTORIAN(S):  Friends at bedside    EXTERNAL RECORDS REVIEWED  Reviewed admission in 2022, reviewed last clinic note where patient missed appointment for pharmacotherapy.     REVIEW OF SYSTEMS  Negative    PAST MEDICAL HISTORY  Past Medical History:   Diagnosis Date    HF (heart failure) ([facility])     Hypertension      FAMILY HISTORY  None " "noted    SOCIAL HISTORY  Social History     Tobacco Use    Smoking status: Never    Smokeless tobacco: Never   Vaping Use    Vaping status: Never Used   Substance Use Topics    Alcohol use: Yes     Comment: \"sometimes\"    Drug use: Not Currently     Social History     Substance and Sexual Activity   Drug Use Not Currently       SURGICAL HISTORY  No past surgical history on file.    CURRENT MEDICATIONS  No current facility-administered medications for this encounter.    Current Outpatient Medications:     ENTRESTO 24-26 MG Tab, Take 1 tablet by mouth twice daily, Disp: 180 Tablet, Rfl: 0    cyclobenzaprine (FLEXERIL) 10 mg Tab, TAKE 1 TABLET BY MOUTH THREE TIMES DAILY AS NEEDED FOR MUSCLE SPASM FOR 14 DAYS, Disp: , Rfl:     furosemide (LASIX) 20 MG Tab, Take 1 Tablet by mouth every day., Disp: 90 Tablet, Rfl: 3    ALLERGIES  No Known Allergies    PHYSICAL EXAM  VITAL SIGNS: /76   Pulse 71   Temp 36.3 °C (97.4 °F) (Temporal)   Resp 12   Ht 1.829 m (6')   Wt (!) 150 kg (330 lb)   SpO2 91%   BMI 44.76 kg/m²   Reviewed and reassuring  Constitutional: Well developed, Well nourished.  HENT: Normocephalic, atraumatic, bilateral external ears normal, No intraoral erythema, edema, exudate  Eyes: PERRLA, conjunctiva pink, no scleral icterus.   Cardiovascular: Regular rate and rhythm. No murmurs, rubs or gallops.  +2 pitting edema in the lower extremities.  Respiratory: Lungs clear to auscultation bilaterally. No wheezes, rales, or rhonchi.  Abdominal:  Abdomen soft, non-tender, non distended. No rebound, or guarding.    Skin: No erythema, no rash. No wounds or bruising.  Genitourinary: No costovertebral angle tenderness.   Musculoskeletal: no deformities. 1+ pitting edema to the bilateral ankles  Neurologic: Alert, no facial droop noted. All extra ocular muscles intact. Moves all extremities with out weakness noted. Can lift both knees against gravity. Hyperreflexia of the right knee, left knee hard to determine " secondary to pain.   Psychiatric: Affect normal, Judgment normal, Mood normal.     MEDICAL DECISION MAKING:  PROBLEMS EVALUATED THIS VISIT:  Abdominal leg body swelling.  Duration 2 weeks.  Recent hospitalization overnight with unknown etiology.  Patient was started recently on indomethacin and gabapentin for gout and also has been on Ozempic for shot when this all started.  Here the patient had an episode where he was hypoxic.  Not on oxygen as it is resolved.  He is not tachycardic.  Signs mild signs of failure with some pitting edema but no rales.    Medical decision making at this point is quite wide as the patient has been admitted before this could be simply CHF exacerbation.  Patient has reflexes bilaterally in the knees and so Guillain-Barré less likely.  Pneumonia, angioedema secondary to Ozempic, rhabdomyolysis, myositis among other issues are possible.     PLAN:  CBC  Metabolic panel  CMP  BNP  Chest x-ray  Troponin  CPK      12:46 PM - Patient seen and examined at bedside. He presents for leg swelling/tightness and shortness of breath. He also has been unable to walk for the last two weeks as his legs are so weak. Workup initiated with basic labs, chest x-ray, EKG. Added d-dimer, BNP, CPK and troponin.     Differential diagnoses include but not limited to: CHF, medication side effect, Carol-[place] unlikely due to brisk reflex    1:29 PM - CTA chest w/ ordered as D-dimer extremely elevated at 6.96. CT-abdomen also ordered due to distension and pain.     RISK:  High risk admitted to hospital for further workup.      Barriers to care at this time, including but not limited to: Select: Patient does not have established PCP.     RESULTS    LABS Ordered and Reviewed by Me:  Results for orders placed or performed during the hospital encounter of 01/05/25   EKG    Collection Time: 01/05/25 11:42 AM   Result Value Ref Range    Report       [facility] Emergency Dept.    Test Date:   2025  Pt Name:    EVARISTO FORTE               Department: ER  MRN:        [id number]                      Room:  Gender:     Male                         Technician: [id number]  :        1980                   Requested By:ER TRIAGE PROTOCOL  Order #:    [id number]                    Reading MD:    Measurements  Intervals                                Axis  Rate:       79                           P:          39  IL:         167                          QRS:        -28  QRSD:       97                           T:          -30  QT:         445  QTc:        511    Interpretive Statements  Sinus rhythm  Borderline left axis deviation  Abnormal T, consider ischemia, diffuse leads  Prolonged QT interval  Baseline wander in lead(s) I,III,aVL,V4,V6  Compared to ECG 2022 11:52:24  Possible ischemia now present  Prolonged QT interval now present  Sinus tachycardia no longer present  T-wave abnormality still present     CBC with Differential    Collection Time: 25 11:59 AM   Result Value Ref Range    WBC 5.6 4.8 - 10.8 K/uL    RBC 4.16 (L) 4.70 - 6.10 M/uL    Hemoglobin 12.7 (L) 14.0 - 18.0 g/dL    Hematocrit 36.8 (L) 42.0 - 52.0 %    MCV 88.5 81.4 - 97.8 fL    MCH 30.5 27.0 - 33.0 pg    MCHC 34.5 32.3 - 36.5 g/dL    RDW 47.5 35.9 - 50.0 fL    Platelet Count 192 164 - 446 K/uL    MPV 9.2 9.0 - 12.9 fL    Neutrophils-Polys 70.60 44.00 - 72.00 %    Lymphocytes 18.50 (L) 22.00 - 41.00 %    Monocytes 7.10 0.00 - 13.40 %    Eosinophils 3.00 0.00 - 6.90 %    Basophils 0.40 0.00 - 1.80 %    Immature Granulocytes 0.40 0.00 - 0.90 %    Nucleated RBC 0.00 0.00 - 0.20 /100 WBC    Neutrophils (Absolute) 3.98 1.82 - 7.42 K/uL    Lymphs (Absolute) 1.04 1.00 - 4.80 K/uL    Monos (Absolute) 0.40 0.00 - 0.85 K/uL    Eos (Absolute) 0.17 0.00 - 0.51 K/uL    Baso (Absolute) 0.02 0.00 - 0.12 K/uL    Immature Granulocytes (abs) 0.02 0.00 - 0.11 K/uL    NRBC (Absolute) 0.00 K/uL   Comp Metabolic Panel    Collection Time:  01/05/25 11:59 AM   Result Value Ref Range    Sodium 138 135 - 145 mmol/L    Potassium 3.7 3.6 - 5.5 mmol/L    Chloride 107 96 - 112 mmol/L    Co2 20 20 - 33 mmol/L    Anion Gap 11.0 7.0 - 16.0    Glucose 145 (H) 65 - 99 mg/dL    Bun 14 8 - 22 mg/dL    Creatinine 1.27 0.50 - 1.40 mg/dL    Calcium 9.0 8.5 - 10.5 mg/dL    Correct Calcium 9.2 8.5 - 10.5 mg/dL    AST(SGOT) 26 12 - 45 U/L    ALT(SGPT) 18 2 - 50 U/L    Alkaline Phosphatase 74 30 - 99 U/L    Total Bilirubin 0.7 0.1 - 1.5 mg/dL    Albumin 3.8 3.2 - 4.9 g/dL    Total Protein 6.8 6.0 - 8.2 g/dL    Globulin 3.0 1.9 - 3.5 g/dL    A-G Ratio 1.3 g/dL   proBrain Natriuretic Peptide, NT    Collection Time: 01/05/25 11:59 AM   Result Value Ref Range    NT-proBNP 389 (H) 0 - 125 pg/mL   Troponin    Collection Time: 01/05/25 11:59 AM   Result Value Ref Range    Troponin T 9 6 - 19 ng/L   CREATINE KINASE    Collection Time: 01/05/25 11:59 AM   Result Value Ref Range    CPK Total 324 (H) 0 - 154 U/L   D-DIMER    Collection Time: 01/05/25 11:59 AM   Result Value Ref Range    D-Dimer 6.96 (H) 0.00 - 0.50 ug/mL (FEU)   ESTIMATED GFR    Collection Time: 01/05/25 11:59 AM   Result Value Ref Range    GFR (CKD-EPI) 70 >60 mL/min/1.73 m 2     RADIOLOGY    I have independently interpreted the diagnostic imaging associated with this visit and am waiting the final reading from the radiologist.   My preliminary interpretation is a follows: No obvious infiltrates or effusion, questionable loss of right cardiac border  Radiologist interpretation:   DX-CHEST-PORTABLE (1 VIEW)   Final Result      No acute cardiac or pulmonary abnormalities are identified.      CT-CTA CHEST PULMONARY ARTERY W/ RECONS    (Results Pending)   CT-ABDOMEN-PELVIS WITH    (Results Pending)      ED COURSE:    ED Observation Status? No   No noted need for observation for developing issue    INTERVENTIONS BY ME:  Has been updated.  CONSULTANTS/OTHER GROUPS CONTACTED    At this point we are calling the hospitalist  for admission.    FINAL DISPO PLAN   My final recommendation patient be admitted where I last the hospitalist take look at the patient as for further workup which appears not be done as an outpatient.  In addition patient continues to have this body swelling at this point unknown etiology.  It is angioedema from Ozempic?  This is PE.  It is also perhaps some sort of compartment syndrome the abdomen regardless patient be admitted to the hospital for further workup      FINAL IMPRESSION  1. Hypoxia    2. Dyspnea, unspecified type          I, Clovis Rios (Paty), am scribing for, and in the presence of, Tushar Gray, *.    Electronically signed by: Clovis Rios (Paty), 1/5/2025    I, Tushar Gray, * personally performed the services described in this documentation, as scribed by Clovis Rios in my presence, and it is both accurate and complete.    The note accurately reflects work and decisions made by me.  Tushar Gray M.D.  1/5/2025  3:33 PM

## 2025-01-05 NOTE — ED NOTES
Pt placed on 3L nc- pt was sleeping in the room- states he normally wears CPAP at night at home for sleep apnea

## 2025-01-05 NOTE — ED NOTES
Pt wheeled to [facility] [address]. Two person assist when transferring. Pt with numerous complaints. Placed on monitors, ERP to see.

## 2025-01-06 ENCOUNTER — APPOINTMENT (OUTPATIENT)
Dept: CARDIOLOGY | Facility: MEDICAL CENTER | Age: 45
DRG: 176 | End: 2025-01-06
Attending: INTERNAL MEDICINE
Payer: COMMERCIAL

## 2025-01-06 LAB
ALBUMIN SERPL BCP-MCNC: 3.7 G/DL (ref 3.2–4.9)
ALBUMIN/GLOB SERPL: 1.3 G/DL
ALP SERPL-CCNC: 70 U/L (ref 30–99)
ALT SERPL-CCNC: 18 U/L (ref 2–50)
ANION GAP SERPL CALC-SCNC: 10 MMOL/L (ref 7–16)
AST SERPL-CCNC: 15 U/L (ref 12–45)
BASOPHILS # BLD AUTO: 0.6 % (ref 0–1.8)
BASOPHILS # BLD: 0.04 K/UL (ref 0–0.12)
BILIRUB SERPL-MCNC: 0.5 MG/DL (ref 0.1–1.5)
BUN SERPL-MCNC: 14 MG/DL (ref 8–22)
CALCIUM ALBUM COR SERPL-MCNC: 9 MG/DL (ref 8.5–10.5)
CALCIUM SERPL-MCNC: 8.8 MG/DL (ref 8.5–10.5)
CHLORIDE SERPL-SCNC: 108 MMOL/L (ref 96–112)
CHOLEST SERPL-MCNC: 139 MG/DL (ref 100–199)
CO2 SERPL-SCNC: 23 MMOL/L (ref 20–33)
CREAT SERPL-MCNC: 1.27 MG/DL (ref 0.5–1.4)
EKG IMPRESSION: NORMAL
EOSINOPHIL # BLD AUTO: 0.2 K/UL (ref 0–0.51)
EOSINOPHIL NFR BLD: 3.2 % (ref 0–6.9)
ERYTHROCYTE [DISTWIDTH] IN BLOOD BY AUTOMATED COUNT: 52 FL (ref 35.9–50)
EST. AVERAGE GLUCOSE BLD GHB EST-MCNC: 108 MG/DL
GFR SERPLBLD CREATININE-BSD FMLA CKD-EPI: 70 ML/MIN/1.73 M 2
GLOBULIN SER CALC-MCNC: 2.9 G/DL (ref 1.9–3.5)
GLUCOSE BLD STRIP.AUTO-MCNC: 93 MG/DL (ref 65–99)
GLUCOSE SERPL-MCNC: 95 MG/DL (ref 65–99)
HBA1C MFR BLD: 5.4 % (ref 4–5.6)
HCT VFR BLD AUTO: 36.2 % (ref 42–52)
HDLC SERPL-MCNC: 41 MG/DL
HGB BLD-MCNC: 11.8 G/DL (ref 14–18)
IMM GRANULOCYTES # BLD AUTO: 0.02 K/UL (ref 0–0.11)
IMM GRANULOCYTES NFR BLD AUTO: 0.3 % (ref 0–0.9)
LDLC SERPL CALC-MCNC: 84 MG/DL
LV EJECT FRACT  99904: 65
LV EJECT FRACT MOD 2C 99903: 56.33
LV EJECT FRACT MOD 4C 99902: 64.8
LV EJECT FRACT MOD BP 99901: 63.78
LYMPHOCYTES # BLD AUTO: 1.77 K/UL (ref 1–4.8)
LYMPHOCYTES NFR BLD: 28.5 % (ref 22–41)
MAGNESIUM SERPL-MCNC: 2.2 MG/DL (ref 1.5–2.5)
MCH RBC QN AUTO: 30.6 PG (ref 27–33)
MCHC RBC AUTO-ENTMCNC: 32.6 G/DL (ref 32.3–36.5)
MCV RBC AUTO: 93.8 FL (ref 81.4–97.8)
MONOCYTES # BLD AUTO: 0.66 K/UL (ref 0–0.85)
MONOCYTES NFR BLD AUTO: 10.6 % (ref 0–13.4)
NEUTROPHILS # BLD AUTO: 3.53 K/UL (ref 1.82–7.42)
NEUTROPHILS NFR BLD: 56.8 % (ref 44–72)
NRBC # BLD AUTO: 0 K/UL
NRBC BLD-RTO: 0 /100 WBC (ref 0–0.2)
PLATELET # BLD AUTO: 172 K/UL (ref 164–446)
PMV BLD AUTO: 9 FL (ref 9–12.9)
POTASSIUM SERPL-SCNC: 4.3 MMOL/L (ref 3.6–5.5)
PROT SERPL-MCNC: 6.6 G/DL (ref 6–8.2)
RBC # BLD AUTO: 3.86 M/UL (ref 4.7–6.1)
SODIUM SERPL-SCNC: 141 MMOL/L (ref 135–145)
TRIGL SERPL-MCNC: 70 MG/DL (ref 0–149)
TROPONIN T SERPL-MCNC: 9 NG/L (ref 6–19)
WBC # BLD AUTO: 6.2 K/UL (ref 4.8–10.8)

## 2025-01-06 PROCEDURE — 83036 HEMOGLOBIN GLYCOSYLATED A1C: CPT

## 2025-01-06 PROCEDURE — 80061 LIPID PANEL: CPT

## 2025-01-06 PROCEDURE — 700102 HCHG RX REV CODE 250 W/ 637 OVERRIDE(OP): Performed by: INTERNAL MEDICINE

## 2025-01-06 PROCEDURE — 99291 CRITICAL CARE FIRST HOUR: CPT | Performed by: INTERNAL MEDICINE

## 2025-01-06 PROCEDURE — 93005 ELECTROCARDIOGRAM TRACING: CPT | Mod: TC

## 2025-01-06 PROCEDURE — 700111 HCHG RX REV CODE 636 W/ 250 OVERRIDE (IP): Mod: JZ

## 2025-01-06 PROCEDURE — 93306 TTE W/DOPPLER COMPLETE: CPT

## 2025-01-06 PROCEDURE — 83735 ASSAY OF MAGNESIUM: CPT

## 2025-01-06 PROCEDURE — 97535 SELF CARE MNGMENT TRAINING: CPT

## 2025-01-06 PROCEDURE — 80053 COMPREHEN METABOLIC PANEL: CPT

## 2025-01-06 PROCEDURE — A9270 NON-COVERED ITEM OR SERVICE: HCPCS | Performed by: INTERNAL MEDICINE

## 2025-01-06 PROCEDURE — 770020 HCHG ROOM/CARE - TELE (206)

## 2025-01-06 PROCEDURE — 97166 OT EVAL MOD COMPLEX 45 MIN: CPT

## 2025-01-06 PROCEDURE — 82962 GLUCOSE BLOOD TEST: CPT

## 2025-01-06 PROCEDURE — 700111 HCHG RX REV CODE 636 W/ 250 OVERRIDE (IP): Performed by: INTERNAL MEDICINE

## 2025-01-06 PROCEDURE — 36415 COLL VENOUS BLD VENIPUNCTURE: CPT

## 2025-01-06 PROCEDURE — 84484 ASSAY OF TROPONIN QUANT: CPT

## 2025-01-06 PROCEDURE — 85025 COMPLETE CBC W/AUTO DIFF WBC: CPT

## 2025-01-06 PROCEDURE — 93306 TTE W/DOPPLER COMPLETE: CPT | Mod: 26 | Performed by: INTERNAL MEDICINE

## 2025-01-06 RX ORDER — FUROSEMIDE 20 MG/1
20 TABLET ORAL DAILY
Status: DISCONTINUED | OUTPATIENT
Start: 2025-01-06 | End: 2025-01-09 | Stop reason: HOSPADM

## 2025-01-06 RX ORDER — HEPARIN SODIUM 5000 [USP'U]/100ML
0-30 INJECTION, SOLUTION INTRAVENOUS CONTINUOUS
Status: DISCONTINUED | OUTPATIENT
Start: 2025-01-07 | End: 2025-01-08

## 2025-01-06 RX ORDER — MORPHINE SULFATE 4 MG/ML
2 INJECTION INTRAVENOUS ONCE
Status: COMPLETED | OUTPATIENT
Start: 2025-01-06 | End: 2025-01-06

## 2025-01-06 RX ORDER — HEPARIN SODIUM 1000 [USP'U]/ML
40 INJECTION, SOLUTION INTRAVENOUS; SUBCUTANEOUS PRN
Status: DISCONTINUED | OUTPATIENT
Start: 2025-01-07 | End: 2025-01-08

## 2025-01-06 RX ORDER — CARVEDILOL 6.25 MG/1
6.25 TABLET ORAL 2 TIMES DAILY WITH MEALS
Status: DISCONTINUED | OUTPATIENT
Start: 2025-01-06 | End: 2025-01-07

## 2025-01-06 RX ADMIN — ENOXAPARIN SODIUM 150 MG: 150 INJECTION SUBCUTANEOUS at 16:31

## 2025-01-06 RX ADMIN — SACUBITRIL AND VALSARTAN 1 TABLET: 24; 26 TABLET, FILM COATED ORAL at 16:31

## 2025-01-06 RX ADMIN — MORPHINE SULFATE 2 MG: 4 INJECTION, SOLUTION INTRAMUSCULAR; INTRAVENOUS at 05:26

## 2025-01-06 RX ADMIN — ACETAMINOPHEN 650 MG: 325 TABLET ORAL at 09:36

## 2025-01-06 RX ADMIN — CARVEDILOL 6.25 MG: 6.25 TABLET, FILM COATED ORAL at 09:27

## 2025-01-06 RX ADMIN — HYDRALAZINE HYDROCHLORIDE 10 MG: 20 INJECTION, SOLUTION INTRAMUSCULAR; INTRAVENOUS at 04:18

## 2025-01-06 RX ADMIN — ACETAMINOPHEN 650 MG: 325 TABLET ORAL at 19:43

## 2025-01-06 RX ADMIN — ENOXAPARIN SODIUM 150 MG: 150 INJECTION SUBCUTANEOUS at 05:27

## 2025-01-06 RX ADMIN — CARVEDILOL 6.25 MG: 6.25 TABLET, FILM COATED ORAL at 16:31

## 2025-01-06 RX ADMIN — SACUBITRIL AND VALSARTAN 1 TABLET: 24; 26 TABLET, FILM COATED ORAL at 09:27

## 2025-01-06 RX ADMIN — FUROSEMIDE 20 MG: 20 TABLET ORAL at 09:27

## 2025-01-06 ASSESSMENT — COGNITIVE AND FUNCTIONAL STATUS - GENERAL
DRESSING REGULAR UPPER BODY CLOTHING: A LITTLE
HELP NEEDED FOR BATHING: A LOT
DRESSING REGULAR LOWER BODY CLOTHING: A LOT
DAILY ACTIVITIY SCORE: 17
SUGGESTED CMS G CODE MODIFIER DAILY ACTIVITY: CK
PERSONAL GROOMING: A LITTLE
TOILETING: A LITTLE

## 2025-01-06 ASSESSMENT — PAIN DESCRIPTION - PAIN TYPE
TYPE: ACUTE PAIN

## 2025-01-06 ASSESSMENT — ENCOUNTER SYMPTOMS
SHORTNESS OF BREATH: 1
MYALGIAS: 1
ABDOMINAL PAIN: 1

## 2025-01-06 ASSESSMENT — ACTIVITIES OF DAILY LIVING (ADL): TOILETING: INDEPENDENT

## 2025-01-06 NOTE — PROGRESS NOTES
"Hospital Medicine Daily Progress Note    Date of Service  1/6/2025    Chief Complaint  Oracio Jurado is a 44 y.o. male admitted 1/5/2025 with   Chief Complaint   Patient presents with    Leg Swelling     BLE swelling X 2 wks  Hx: CHF    Shortness of Breath     Pt states, \"It feels like my chest is being crushed.         Hospital Course  No notes on file    Oracio Jurado is a 44 y.o. male past medical history of congestive heart failure and hypertension who presented to the hospital on 1/5/2025 with bilateral lower extremities edema abdominal tightness and shortness of breath for approximately 2 weeks.  He reported he has been noticing leg edema and tightness around his abdomen.  He reported that his shortness of breath getting worse and he is unable to talk fast and he has to take a deep breath and before talking and completing sentences.  He tried to increase his dose of Lasix for his lower extremities edema but it did not help any symptoms of edema.  He expressed that he used cocaine on Natan but he does not use it on regular basis.  He does not drink alcohol and excessive amount.  He denies any other acute complaints or associated symptoms.  There is no specific aggravating or alleviating factors.     Patient reported he traveled frequently to [place] and sometime it takes several hours due to construction from [place] and his last reports approximately 2 weeks ago.     ER course: Patient found to have bilateral pulmonary emboli.  I requested evaluation from critical care Dr. Pavon for possible intervention for his pulmonary emboli.  Troponin is normal limits elevated BNP  Patient was noted to have a Jeovany score of 2, mortality is 4.4%    Lower extremity ultrasound showed acute-subacute partially occlusive thrombus in the right distal popliteal vein.  Acute-subacute occlusive thrombus in one of the 2 right proximal posterior tibial veins.  Extensive acute to subacute DVT in the left lower " extremity.    Echocardiogram show ejection fraction 60 to 65%.  No pericardial effusion.  Normal IVC size.  Normal biventricular systolic functions.  Ascending aortic diameter is 4 cm    Interval Problem Update  Patient was seen and examined at bedside.  I have personally reviewed and interpreted vitals, labs, and imaging.    1/6.  Afebrile.  Bradycardic this morning.  Has been hypertensive.  On 2-3 L nasal cannula.  Denies fever, chills.  Patient is short of breath.  Complains of pain across his abdomen and suprapubic area feels like he is being squished/crushed when like a strap across his belly.  Complains of bilateral lower extremity pain.  I reviewed echo results with him.  No right heart strain, low Jeovany score.  No need for thrombectomy.  He has asked about possible thrombectomy for clot in his lower extremities.  I did discuss this with IR.  Was recommended to switch from Lovenox to heparin drip.  N.p.o. after midnight.  Hgb 11.8  LDL 84  HDL 41  Trop 9      I have discussed this patient's plan of care and discharge plan at IDT rounds today with Case Management, Nursing, Nursing leadership, and other members of the IDT team.    Consultants/Specialty  None    Code Status  Full Code    Disposition  The patient is not medically cleared for discharge to home or a post-acute facility.  Anticipate discharge to: home with organized home healthcare and close outpatient follow-up    I have placed the appropriate orders for post-discharge needs.    Review of Systems  Review of Systems   Respiratory:  Positive for shortness of breath.    Cardiovascular:  Positive for leg swelling. Negative for chest pain.   Gastrointestinal:  Positive for abdominal pain.   Musculoskeletal:  Positive for joint pain and myalgias.        Physical Exam  Temp:  [36.3 °C (97.4 °F)-36.5 °C (97.7 °F)] 36.5 °C (97.7 °F)  Pulse:  [58-81] 58  Resp:  [12-20] 18  BP: (113-184)/() 138/77  SpO2:  [86 %-96 %] 96 %    Physical Exam  Vitals  and nursing note reviewed.   Constitutional:       Appearance: Normal appearance. He is obese. He is ill-appearing.   HENT:      Head: Normocephalic and atraumatic.      Nose: Nose normal.      Mouth/Throat:      Mouth: Mucous membranes are moist.      Pharynx: Oropharynx is clear.   Eyes:      Extraocular Movements: Extraocular movements intact.      Conjunctiva/sclera: Conjunctivae normal.   Cardiovascular:      Rate and Rhythm: Normal rate and regular rhythm.      Pulses: Normal pulses.      Heart sounds: Normal heart sounds. No murmur heard.     No friction rub. No gallop.   Pulmonary:      Effort: Pulmonary effort is normal. No respiratory distress.      Breath sounds: Normal breath sounds. No wheezing or rales.   Chest:      Chest wall: No tenderness.   Abdominal:      General: Abdomen is flat. Bowel sounds are normal. There is no distension.      Palpations: Abdomen is soft. There is no mass.      Tenderness: There is abdominal tenderness. There is no guarding.   Musculoskeletal:         General: Normal range of motion.      Cervical back: Normal range of motion and neck supple.   Skin:     General: Skin is warm.      Capillary Refill: Capillary refill takes less than 2 seconds.   Neurological:      General: No focal deficit present.      Mental Status: He is alert and oriented to person, place, and time. Mental status is at baseline.      Cranial Nerves: No cranial nerve deficit.      Motor: No weakness.   Psychiatric:         Mood and Affect: Mood normal.         Behavior: Behavior normal.         Fluids    Intake/Output Summary (Last 24 hours) at 1/6/2025 0711  Last data filed at 1/6/2025 0400  Gross per 24 hour   Intake 800 ml   Output 1175 ml   Net -375 ml        Laboratory  Recent Labs     01/05/25  1159 01/06/25  0145   WBC 5.6 6.2   RBC 4.16* 3.86*   HEMOGLOBIN 12.7* 11.8*   HEMATOCRIT 36.8* 36.2*   MCV 88.5 93.8   MCH 30.5 30.6   MCHC 34.5 32.6   RDW 47.5 52.0*   PLATELETCT 192 172   MPV 9.2 9.0      Recent Labs     01/05/25  1159 01/06/25  0145   SODIUM 138 141   POTASSIUM 3.7 4.3   CHLORIDE 107 108   CO2 20 23   GLUCOSE 145* 95   BUN 14 14   CREATININE 1.27 1.27   CALCIUM 9.0 8.8     Recent Labs     01/05/25  1656   APTT 20.1*   INR 1.04         Recent Labs     01/06/25  0145   TRIGLYCERIDE 70   HDL 41   LDL 84       Imaging  EC-ECHOCARDIOGRAM COMPLETE W/O CONT   Final Result      US-EXTREMITY VENOUS LOWER BILAT   Final Result      CT-ABDOMEN-PELVIS WITH   Final Result      1.  There is splenomegaly. Spleen measures 18.5 cm in diameter.      2.  Otherwise no acute abnormalities are identified in the abdomen or pelvis.      CT-CTA CHEST PULMONARY ARTERY W/ RECONS   Final Result         1. Extensive bilateral pulmonary embolism including a saddle embolus. There is mild right heart strain.   2. Splenomegaly.   3. Probable fatty infiltration of the liver.      Findings were communicated to the ordering provider at the time of dictation via Voalte.            DX-CHEST-PORTABLE (1 VIEW)   Final Result      No acute cardiac or pulmonary abnormalities are identified.      IR-CONSULT AND TREAT    (Results Pending)        Assessment/Plan  * Bilateral pulmonary embolism (HCC)- (present on admission)  Assessment & Plan  1/6/2025  Patient found to have bilateral pulmonary emboli and mild right heart strain with  Patient Jeovany score is low.  Troponin was normal limits  Elevated BNP could be secondary to history of congestive heart failure.  I ordered urgent echocardiogram.  Initially plan was to place him on heparin but due to extra volume of heparin in the setting of congestive heart failure I started him on therapeutic Lovenox.  Admit to telemetry  I ordered ultrasound DVT for bilateral lower extremities.  I discussed plan of care with intensivist Dr. Pavon.    Switch to heparin drip for DVT/PE.  Evaluation by IR tomorrow    Cocaine abuse (HCC)  Assessment & Plan  1/6/2025  Provided him counseling regarding complete  cessation of cocaine in the setting of congestive heart failure.  He expressed understanding.    Hypoxia  Assessment & Plan  1/6/2025  He found to hypoxia on room air.  I started him on he does not use oxygen at home  I started him on oxygen supplementation.    Hyperglycemia  Assessment & Plan  1/6/2025  He found to hyperglycemia.  I started him on some sliding scale with hypoglycemia protocol.  I ordered HbA1c for tomorrow.  I ordered lipid profile for tomorrow as well.    History of chronic CHF- (present on admission)  Assessment & Plan  1/6/2025  Continue outpatient medications pain  Cardiac diet.    Severe obesity (BMI >= 40) ([facility])- (present on admission)  Assessment & Plan  Body mass index is 44.76 kg/m².  Counseled about diet and exercise         VTE prophylaxis: Heparin drip    I have performed a physical exam and reviewed and updated ROS and Plan today (1/6/2025). In review of yesterday's note (1/5/2025), there are no changes except as documented above.    Patient is critically ill.   The patient continues to have: Venous thromboembolism  The vital organ system that is affected is the: Hematologic  If untreated there is a high chance of deterioration into: Emboli  And eventually death.   The critical care that I am providing today is: Heparin drip  The critical that has been undertaken is medically complex.   There has been no overlap in critical care time.   Critical Care Time not including procedures: 37

## 2025-01-06 NOTE — ED NOTES
Pt transported to T721 with ACT via gurney.  Pt transported with all belongings, on monitor and 3L nc oxygen. Pt awake and oriented.

## 2025-01-06 NOTE — PROGRESS NOTES
Bedside report received from NOC RN. Assumed care of pt. Pt awake, laying in bed. A/Ox4, VSS. No concerns, complaints or distress. Pt educated to call before getting out of bed. POC reviewed and white board updated. Tele box on. SR 65 on the monitor. Call light in reach. Bed locked in lowest position with 2 upper bed rails up. Bed alarm on.

## 2025-01-06 NOTE — H&P
"Hospital Medicine History & Physical Note    Date of Service  1/5/2025    Primary Care Physician  Pcp Pt States None    Consultants  None    Specialist Names: N/A    Code Status  Full Code    Chief Complaint  Chief Complaint   Patient presents with    Leg Swelling     BLE swelling X 2 wks  Hx: CHF    Shortness of Breath     Pt states, \"It feels like my chest is being crushed.       History of Presenting Illness    Oracio Jurado is a 44 y.o. male past ministry of congestive heart failure and hypertension who presented to the hospital on 1/5/2025 with bilateral lower extremities edema abdominal tightness and shortness of breath for approximately 2 weeks.  He reported he has been noticing leg edema and tightness around his abdomen.  He reported that his shortness of breath getting worse and he is unable to talk fast and he has to take a deep breath and before talking and completing sentences.  He tried to increase his dose of Lasix for his lower extremities edema but it did not help any symptoms of edema.  He expressed that he used cocaine on Natan but he does not use it on regular basis.  He does not drink alcohol and excessive amount.  He denies any.  Any other acute complaints or associated symptoms.  There is no specific aggravating or alleviating factors.    Patient reported he traveled frequently to [place] and sometime it takes several hours due to construction from [place] and his last reports approximately 2 weeks ago.    ER course: Patient found to have bilateral pulmonary emboli.  I requested evaluation from critical care Dr. Pavon for possible intervention for his pulmonary emboli.  Troponin is normal limits elevated BNP    I discussed about his admission with ER physician Dr. Gray     I discussed the plan of care with patient and bedside RN.    Review of Systems  Review of Systems   Constitutional:  Positive for malaise/fatigue. Negative for chills, fever and weight loss.   HENT:  " Negative for hearing loss and tinnitus.    Eyes:  Negative for blurred vision, double vision, photophobia and pain.   Respiratory:  Positive for shortness of breath. Negative for cough and sputum production.    Cardiovascular:  Negative for chest pain, palpitations, orthopnea and leg swelling.   Gastrointestinal:  Negative for abdominal pain, constipation, diarrhea, nausea and vomiting.   Genitourinary:  Negative for dysuria, frequency and urgency.   Musculoskeletal:  Negative for back pain, joint pain, myalgias and neck pain.   Skin:  Negative for rash.   Neurological:  Positive for weakness. Negative for dizziness, tingling, tremors, sensory change, speech change, focal weakness and headaches.   Psychiatric/Behavioral:  Negative for hallucinations and substance abuse.    All other systems reviewed and are negative.      Past Medical History   has a past medical history of HF (heart failure) (HCC) and Hypertension.    Surgical History   has no past surgical history on file.     Family History  family history is not on file.   Family history reviewed with patient. There is no family history that is pertinent to the chief complaint.     Social History   reports that he has never smoked. He has never used smokeless tobacco. He reports current alcohol use. He reports that he does not currently use drugs.    Allergies  No Known Allergies    Medications  Prior to Admission Medications   Prescriptions Last Dose Informant Patient Reported? Taking?   ENTRESTO 24-26 MG Tab 1/5/2025 Morning Rx Bottle (For Med Information) No Yes   Sig: Take 1 tablet by mouth twice daily   Semaglutide (OZEMPIC, 0.25 OR 0.5 MG/DOSE, SC) 1/1/2025 Patient's Home Pharmacy Yes Yes   Sig: Inject 0.25 mg under the skin every 7 days.   carvedilol (COREG) 6.25 MG Tab 1/5/2025 Rx Bottle (For Med Information) Yes Yes   Sig: Take 6.25 mg by mouth 2 times a day with meals.   furosemide (LASIX) 20 MG Tab 1/5/2025 Morning Rx Bottle (For Med Information) No  Yes   Sig: Take 1 Tablet by mouth every day.   gabapentin (NEURONTIN) 100 MG Cap 1/5/2025 Morning Rx Bottle (For Med Information) Yes Yes   Sig: Take 100 mg by mouth 3 times a day. X 21 days   indomethacin (INDOCIN) 50 MG Cap 1/4/2025 Evening Rx Bottle (For Med Information) Yes Yes   Sig: Take 50 mg by mouth every 8 hours as needed (gout).      Facility-Administered Medications: None       Physical Exam  Temp:  [36.3 °C (97.4 °F)] 36.3 °C (97.4 °F)  Pulse:  [65-81] 81  Resp:  [12-20] 19  BP: (113-165)/() 157/103  SpO2:  [86 %-96 %] 95 %  Blood Pressure: (!) 157/103   Temperature: 36.3 °C (97.4 °F)   Pulse: 81   Respiration: 19   Pulse Oximetry: 95 %       Physical Exam  Vitals reviewed.   Constitutional:       General: He is not in acute distress.     Appearance: He is obese. He is ill-appearing.   HENT:      Head: Normocephalic and atraumatic. No contusion.      Right Ear: External ear normal.      Left Ear: External ear normal.      Nose: Nose normal.      Comments: Oxygen nasal cannula     Mouth/Throat:      Pharynx: No oropharyngeal exudate.   Eyes:      General:         Right eye: No discharge.         Left eye: No discharge.      Pupils: Pupils are equal, round, and reactive to light.   Cardiovascular:      Rate and Rhythm: Normal rate and regular rhythm.      Heart sounds: No murmur heard.     No friction rub. No gallop.   Pulmonary:      Effort: Pulmonary effort is normal.      Breath sounds: No wheezing or rhonchi.   Abdominal:      General: Bowel sounds are normal. There is no distension.      Palpations: Abdomen is soft.      Tenderness: There is no abdominal tenderness. There is no rebound.   Musculoskeletal:         General: No swelling or tenderness. Normal range of motion.      Cervical back: No rigidity. No muscular tenderness.   Skin:     General: Skin is warm and dry.      Coloration: Skin is not jaundiced.   Neurological:      General: No focal deficit present.      Mental Status: He is  alert and oriented to person, place, and time.      Cranial Nerves: No cranial nerve deficit.      Sensory: No sensory deficit.      Comments: He is following commands and moving his extremities   Psychiatric:         Mood and Affect: Mood normal.         Laboratory:  Recent Labs     01/05/25  1159   WBC 5.6   RBC 4.16*   HEMOGLOBIN 12.7*   HEMATOCRIT 36.8*   MCV 88.5   MCH 30.5   MCHC 34.5   RDW 47.5   PLATELETCT 192   MPV 9.2     Recent Labs     01/05/25  1159   SODIUM 138   POTASSIUM 3.7   CHLORIDE 107   CO2 20   GLUCOSE 145*   BUN 14   CREATININE 1.27   CALCIUM 9.0     Recent Labs     01/05/25  1159   ALTSGPT 18   ASTSGOT 26   ALKPHOSPHAT 74   TBILIRUBIN 0.7   GLUCOSE 145*         Recent Labs     01/05/25  1159   NTPROBNP 389*         Recent Labs     01/05/25  1159   TROPONINT 9       Imaging:  CT-ABDOMEN-PELVIS WITH   Final Result      1.  There is splenomegaly. Spleen measures 18.5 cm in diameter.      2.  Otherwise no acute abnormalities are identified in the abdomen or pelvis.      CT-CTA CHEST PULMONARY ARTERY W/ RECONS   Final Result         1. Extensive bilateral pulmonary embolism including a saddle embolus. There is mild right heart strain.   2. Splenomegaly.   3. Probable fatty infiltration of the liver.      Findings were communicated to the ordering provider at the time of dictation via Voalte.            DX-CHEST-PORTABLE (1 VIEW)   Final Result      No acute cardiac or pulmonary abnormalities are identified.      EC-ECHOCARDIOGRAM COMPLETE W/O CONT    (Results Pending)            Assessment/Plan:  Justification for Admission Status  I anticipate this patient will require at least two midnights for appropriate medical management, necessitating inpatient admission because patient found to have extensive bilateral pulmonary embolism including a saddle embolism and he also found to have mild right heart strain that will require anticoagulation and echocardiogram that will determine that whether he will  need any intervention.    Patient will need a Telemetry bed on MEDICAL service .  The need is secondary to bilateral pulmonary emboli with hypoxia.    * Bilateral pulmonary embolism (HCC)- (present on admission)  Assessment & Plan  Patient found to have bilateral pulmonary emboli and mild right heart strain with  Patient Jeovany score is low.  Troponin was normal limits  Elevated BNP could be secondary to history of congestive heart failure.  I ordered urgent echocardiogram.  Initially plan was to place him on heparin but due to extra volume of heparin in the setting of congestive heart failure I started him on therapeutic Lovenox.  Admit to telemetry  I ordered ultrasound DVT for bilateral lower extremities.  I discussed plan of care with intensivist Dr. Pavon.    Cocaine abuse (HCC)  Assessment & Plan  Provided him counseling regarding complete cessation of cocaine in the setting of congestive heart failure.  He expressed understanding.    Hypoxia  Assessment & Plan  He found to hypoxia on room air.  I started him on he does not use oxygen at home  I started him on oxygen supplementation.    Hyperglycemia  Assessment & Plan  He found to hyperglycemia.  I started him on some sliding scale with hypoglycemia protocol.  I ordered HbA1c for tomorrow.  I ordered lipid profile for tomorrow as well.    History of chronic CHF- (present on admission)  Assessment & Plan  Continue outpatient medications pain  Cardiac diet.      Severe obesity (BMI >= 40) (HCC)- (present on admission)  Assessment & Plan  Lifestyle modifications  Outpatient follow-up      I discussed with intensivist Dr. Pavon regarding Mr. Jurado findings of CTA pulmonary    I discussed with the ER physician Dr. Gray     VTE prophylaxis: therapeutic anticoagulation with Lovenox

## 2025-01-06 NOTE — CARE PLAN
Problem: Knowledge Deficit - Standard  Goal: Patient and family/care givers will demonstrate understanding of plan of care, disease process/condition, diagnostic tests and medications  Outcome: Progressing  Note: Discussed POC and medications with patient.  Patient verbalized understanding.      Problem: Pain - Standard  Goal: Alleviation of pain or a reduction in pain to the patient’s comfort goal  Outcome: Progressing  Note: POC regarding pain management discussed with pt.  Pt will be medicated for pain per MAR    The patient is Stable - Low risk of patient condition declining or worsening    Shift Goals  Clinical Goals: anticoagulate  Patient Goals: Rest

## 2025-01-06 NOTE — ASSESSMENT & PLAN NOTE
He found to hyperglycemia.  I started him on some sliding scale with hypoglycemia protocol.  I ordered HbA1c for tomorrow.  I ordered lipid profile for tomorrow as well.

## 2025-01-06 NOTE — ED NOTES
Med Rec complete per patient with RX bottles at bedside (returned to patient)   Allergies reviewed  Antibiotics in the past 30 days:no  Anticoagulant in past 14 days:no  Pharmacy patient utilizes:[facility] in [place], [place]    Pt states he thinks the Ozempic is causing his symptoms

## 2025-01-06 NOTE — PROGRESS NOTES
4 Eyes Skin Assessment Completed by RUFUS Nagy and RUFUS Nava.    Head WDL  Ears WDL  Nose WDL  Mouth WDL  Neck WDL  Breast/Chest WDL  Shoulder Blades WDL  Spine WDL  (R) Arm/Elbow/Hand WDL  (L) Arm/Elbow/Hand WDL  Abdomen WDL  Groin WDL  Scrotum/Coccyx/Buttocks WDL  (R) Leg Swelling and Edema scar on the shin  (L) Leg Swelling and Edema  (R) Heel/Foot/Toe Swelling and Edema  (L) Heel/Foot/Toe Swelling and Edema          Devices In Places Tele Box, Blood Pressure Cuff, and Pulse Ox      Interventions In Place Pillows and Pressure Redistribution Mattress    Possible Skin Injury No    Pictures Uploaded Into Epic No, needs to be completed  Wound Consult Placed N/A  RN Wound Prevention Protocol Ordered No

## 2025-01-06 NOTE — ASSESSMENT & PLAN NOTE
Patient found to have bilateral pulmonary emboli and mild right heart strain with  Patient Jeovany score is low.  Troponin was normal limits  Elevated BNP could be secondary to history of congestive heart failure.  I ordered urgent echocardiogram.  Initially plan was to place him on heparin but due to extra volume of heparin in the setting of congestive heart failure I started him on therapeutic Lovenox.  Admit to telemetry  Lower extremity DVT on ultrasound  No intervention necessary per IR  Transitioned to eliquis

## 2025-01-06 NOTE — DIETARY
NUTRITION SERVICES: BMI - Pt with BMI >40 (=Body mass index is 44.76 kg/m².), morbid obesity. Weight loss counseling not appropriate in acute care setting.     RECOMMEND - If appropriate at DC please refer to outpatient nutrition services for weight management.

## 2025-01-06 NOTE — CARE PLAN
The patient is Stable - Low risk of patient condition declining or worsening    Shift Goals  Clinical Goals: vs, pain control,  Patient Goals: comfort, rest  Family Goals: maria teresa    Progress made toward(s) clinical / shift goals:    Problem: Pain - Standard  Goal: Alleviation of pain or a reduction in pain to the patient’s comfort goal  Description: Target End Date:  Prior to discharge or change in level of care    Document on Vitals flowsheet    1.  Document pain using the appropriate pain scale per order or unit policy  2.  Educate and implement non-pharmacologic comfort measures (i.e. relaxation, distraction, massage, cold/heat therapy, etc.)  3.  Pain management medications as ordered  4.  Reassess pain after pain med administration per policy  5.  If opiods administered assess patient's response to pain medication is appropriate per POSS sedation scale  6.  Follow pain management plan developed in collaboration with patient and interdisciplinary team (including palliative care or pain specialists if applicable)  Outcome: Progressing     Problem: Hemodynamics  Goal: Patient's hemodynamics, fluid balance and neurologic status will be stable or improve  Description: Target End Date:  Prior to discharge or change in level of care    Document on Assessment and I/O flowsheet templates    1.  Monitor vital signs, pulse oximetry and cardiac monitor per provider order and/or policy  2.  Maintain blood pressure per provider order  3.  Hemodynamic monitoring per provider order  4.  Manage IV fluids and IV infusions  5.  Monitor intake and output  6.  Daily weights per unit policy or provider order  7.  Assess peripheral pulses and capillary refill  8.  Assess color and body temperature  9.  Position patient for maximum circulation/cardiac output  10. Monitor for signs/symptoms of excessive bleeding  11. Assess mental status, restlessness and changes in level of consciousness  12. Monitor temperature and report fever or  hypothermia to provider immediately. Consideration of targeted temperature management.  Outcome: Progressing     Problem: Respiratory  Goal: Patient will achieve/maintain optimum respiratory ventilation and gas exchange  Description: Target End Date:  Prior to discharge or change in level of care    Document on Assessment flowsheet    1.  Assess and monitor rate, rhythm, depth and effort of respiration  2.  Breath sounds assessed qshift and/or as needed  3.  Assess O2 saturation, administer/titrate oxygen as ordered  4.  Position patient for maximum ventilatory efficiency  5.  Turn, cough, and deep breath with splinting to improve effectiveness  6.  Collaborate with RT to administer medication/treatments per order  7.  Encourage use of incentive spirometer and encourage patient to cough after use and utilize splinting techniques if applicable  8.  Airway suctioning  9.  Monitor sputum production for changes in color, consistency and frequency  10. Perform frequent oral hygiene  11. Alternate physical activity with rest periods  Outcome: Progressing       Patient is not progressing towards the following goals:

## 2025-01-06 NOTE — PROGRESS NOTES
Called for evaluation for saddle pulmonary emboli by Dr Haddad.     44y M HTN, alcohol use disorder, obesity BMI 45, CHF. That presented for chest pain, leg pain and swelling. His vitals HR 60-80 -157 on 3l n/c. He has normal labs but , trop negative, CT scan with saddle PE with mild RV to LV ratio. His LESLEY score is 2 stage I mortality is 4.4%. Recommend Echo, lower ext doppler. But if no significant clot burden or significant dysfunction as I already scored this in would continue with just full dose anticoagulation w/ rx dose lovenox. His etiology seems likely due to sedantary lifestyle use a front walker and frequent trips to [place]. Exam no distress warm ext, no mottling or pallor, no cyanosis or respiratory distress talking in full sentences.     Call for change in status.     Damien Pavon MD  Critical Care Medicine

## 2025-01-06 NOTE — PROGRESS NOTES
NOC HOSPITALIST CROSS COVER    Notified by RN regarding crushing chest and leg pain.  Order placed for EKG, troponin, pain medication.  Troponin is pending, EKG largely unremarkable.  Patient remains hemodynamically stable with a blood pressure 138/77 heart rate of 67.      -----------------------------------------------------------------------------------------------------------    Electronically signed by:  LUIS Agarwal PA-C  Hospitalist Services

## 2025-01-07 LAB
ANION GAP SERPL CALC-SCNC: 10 MMOL/L (ref 7–16)
APTT PPP: 31.2 SEC (ref 24.7–36)
BUN SERPL-MCNC: 12 MG/DL (ref 8–22)
CALCIUM SERPL-MCNC: 9.2 MG/DL (ref 8.5–10.5)
CHLORIDE SERPL-SCNC: 107 MMOL/L (ref 96–112)
CO2 SERPL-SCNC: 22 MMOL/L (ref 20–33)
CREAT SERPL-MCNC: 1.25 MG/DL (ref 0.5–1.4)
ERYTHROCYTE [DISTWIDTH] IN BLOOD BY AUTOMATED COUNT: 51 FL (ref 35.9–50)
GFR SERPLBLD CREATININE-BSD FMLA CKD-EPI: 72 ML/MIN/1.73 M 2
GLUCOSE SERPL-MCNC: 92 MG/DL (ref 65–99)
HCT VFR BLD AUTO: 36.7 % (ref 42–52)
HGB BLD-MCNC: 12.1 G/DL (ref 14–18)
INR PPP: 1.1 (ref 0.87–1.13)
MAGNESIUM SERPL-MCNC: 2.1 MG/DL (ref 1.5–2.5)
MCH RBC QN AUTO: 30.5 PG (ref 27–33)
MCHC RBC AUTO-ENTMCNC: 33 G/DL (ref 32.3–36.5)
MCV RBC AUTO: 92.4 FL (ref 81.4–97.8)
PHOSPHATE SERPL-MCNC: 3.4 MG/DL (ref 2.5–4.5)
PLATELET # BLD AUTO: 182 K/UL (ref 164–446)
PMV BLD AUTO: 9.3 FL (ref 9–12.9)
POTASSIUM SERPL-SCNC: 4.4 MMOL/L (ref 3.6–5.5)
PROTHROMBIN TIME: 14.2 SEC (ref 12–14.6)
RBC # BLD AUTO: 3.97 M/UL (ref 4.7–6.1)
SODIUM SERPL-SCNC: 139 MMOL/L (ref 135–145)
UFH PPP CHRO-ACNC: 0.77 IU/ML
UFH PPP CHRO-ACNC: 0.88 IU/ML
UFH PPP CHRO-ACNC: 1.04 IU/ML
WBC # BLD AUTO: 6 K/UL (ref 4.8–10.8)

## 2025-01-07 PROCEDURE — 80048 BASIC METABOLIC PNL TOTAL CA: CPT

## 2025-01-07 PROCEDURE — 700102 HCHG RX REV CODE 250 W/ 637 OVERRIDE(OP): Performed by: INTERNAL MEDICINE

## 2025-01-07 PROCEDURE — A9270 NON-COVERED ITEM OR SERVICE: HCPCS | Performed by: INTERNAL MEDICINE

## 2025-01-07 PROCEDURE — 85027 COMPLETE CBC AUTOMATED: CPT

## 2025-01-07 PROCEDURE — 83735 ASSAY OF MAGNESIUM: CPT

## 2025-01-07 PROCEDURE — 700111 HCHG RX REV CODE 636 W/ 250 OVERRIDE (IP): Performed by: INTERNAL MEDICINE

## 2025-01-07 PROCEDURE — 84100 ASSAY OF PHOSPHORUS: CPT

## 2025-01-07 PROCEDURE — 85520 HEPARIN ASSAY: CPT | Mod: 91

## 2025-01-07 PROCEDURE — 85730 THROMBOPLASTIN TIME PARTIAL: CPT

## 2025-01-07 PROCEDURE — 770020 HCHG ROOM/CARE - TELE (206)

## 2025-01-07 PROCEDURE — 36415 COLL VENOUS BLD VENIPUNCTURE: CPT

## 2025-01-07 PROCEDURE — 85610 PROTHROMBIN TIME: CPT

## 2025-01-07 PROCEDURE — 99233 SBSQ HOSP IP/OBS HIGH 50: CPT | Performed by: INTERNAL MEDICINE

## 2025-01-07 RX ORDER — HYDROMORPHONE HYDROCHLORIDE 1 MG/ML
0.25 INJECTION, SOLUTION INTRAMUSCULAR; INTRAVENOUS; SUBCUTANEOUS
Status: DISCONTINUED | OUTPATIENT
Start: 2025-01-07 | End: 2025-01-09 | Stop reason: HOSPADM

## 2025-01-07 RX ORDER — OXYCODONE HYDROCHLORIDE 5 MG/1
5 TABLET ORAL
Status: DISCONTINUED | OUTPATIENT
Start: 2025-01-07 | End: 2025-01-09 | Stop reason: HOSPADM

## 2025-01-07 RX ORDER — OXYCODONE HYDROCHLORIDE 5 MG/1
2.5 TABLET ORAL
Status: DISCONTINUED | OUTPATIENT
Start: 2025-01-07 | End: 2025-01-09 | Stop reason: HOSPADM

## 2025-01-07 RX ORDER — GABAPENTIN 100 MG/1
100 CAPSULE ORAL 3 TIMES DAILY
Status: DISCONTINUED | OUTPATIENT
Start: 2025-01-08 | End: 2025-01-08

## 2025-01-07 RX ORDER — CARVEDILOL 12.5 MG/1
12.5 TABLET ORAL 2 TIMES DAILY WITH MEALS
Status: DISCONTINUED | OUTPATIENT
Start: 2025-01-07 | End: 2025-01-09 | Stop reason: HOSPADM

## 2025-01-07 RX ADMIN — CARVEDILOL 12.5 MG: 12.5 TABLET, FILM COATED ORAL at 16:48

## 2025-01-07 RX ADMIN — HEPARIN SODIUM 18 UNITS/KG/HR: 5000 INJECTION, SOLUTION INTRAVENOUS at 04:09

## 2025-01-07 RX ADMIN — SACUBITRIL AND VALSARTAN 1 TABLET: 24; 26 TABLET, FILM COATED ORAL at 04:57

## 2025-01-07 RX ADMIN — HEPARIN SODIUM 13 UNITS/KG/HR: 5000 INJECTION, SOLUTION INTRAVENOUS at 18:59

## 2025-01-07 RX ADMIN — FUROSEMIDE 20 MG: 20 TABLET ORAL at 04:57

## 2025-01-07 RX ADMIN — CARVEDILOL 6.25 MG: 6.25 TABLET, FILM COATED ORAL at 07:25

## 2025-01-07 RX ADMIN — SACUBITRIL AND VALSARTAN 1 TABLET: 24; 26 TABLET, FILM COATED ORAL at 16:47

## 2025-01-07 RX ADMIN — OXYCODONE 5 MG: 5 TABLET ORAL at 16:46

## 2025-01-07 RX ADMIN — OXYCODONE 5 MG: 5 TABLET ORAL at 19:54

## 2025-01-07 ASSESSMENT — ENCOUNTER SYMPTOMS
MYALGIAS: 1
HEADACHES: 0
MYALGIAS: 0
NAUSEA: 0
CONSTIPATION: 0
CHILLS: 0
PSYCHIATRIC NEGATIVE: 1
SPUTUM PRODUCTION: 0
FEVER: 0
DOUBLE VISION: 0
SHORTNESS OF BREATH: 0
BACK PAIN: 1
HALLUCINATIONS: 0
COUGH: 0
ABDOMINAL PAIN: 1
HEMOPTYSIS: 0
SEIZURES: 0
VOMITING: 0
BLURRED VISION: 0
SHORTNESS OF BREATH: 1

## 2025-01-07 ASSESSMENT — PAIN DESCRIPTION - PAIN TYPE
TYPE: ACUTE PAIN

## 2025-01-07 ASSESSMENT — LIFESTYLE VARIABLES: SUBSTANCE_ABUSE: 0

## 2025-01-07 ASSESSMENT — FIBROSIS 4 INDEX: FIB4 SCORE: 0.89

## 2025-01-07 NOTE — PROGRESS NOTES
"Hospital Medicine Daily Progress Note    Date of Service  1/7/2025    Chief Complaint  Oracio Jurado is a 44 y.o. male admitted 1/5/2025 with   Chief Complaint   Patient presents with    Leg Swelling     BLE swelling X 2 wks  Hx: CHF    Shortness of Breath     Pt states, \"It feels like my chest is being crushed.         Hospital Course  Oracio Jurado is a 44 y.o. male past medical history of congestive heart failure and hypertension who presented to the hospital on 1/5/2025 with bilateral lower extremities edema abdominal tightness and shortness of breath for approximately 2 weeks.  He reported he has been noticing leg edema and tightness around his abdomen.  He reported that his shortness of breath getting worse and he is unable to talk fast and he has to take a deep breath and before talking and completing sentences.  He tried to increase his dose of Lasix for his lower extremities edema but it did not help any symptoms of edema.  He expressed that he used cocaine on [place] but he does not use it on regular basis.  He does not drink alcohol and excessive amount.  He denies any other acute complaints or associated symptoms.  There is no specific aggravating or alleviating factors.     Patient reported he traveled frequently to [place] and sometime it takes several hours due to construction from [place] and his last reports approximately 2 weeks ago.     ER course: Patient found to have bilateral pulmonary emboli.  I requested evaluation from critical care Dr. Pavon for possible intervention for his pulmonary emboli.  Troponin is normal limits elevated BNP  Patient was noted to have a Jeovany score of 2, mortality is 4.4%    Lower extremity ultrasound showed acute-subacute partially occlusive thrombus in the right distal popliteal vein.  Acute-subacute occlusive thrombus in one of the 2 right proximal posterior tibial veins.  Extensive acute to subacute DVT in the left lower extremity.    Echocardiogram " show ejection fraction 60 to 65%.  No pericardial effusion.  Normal IVC size.  Normal biventricular systolic functions.  Ascending aortic diameter is 4 cm      Interval Problem Update  Patient was seen and examined at bedside.  No acute events overnight. Patient is resting comfortably in bed and in no acute distress.     Heparin drip   IR assessment for possible intervention  EF has recovered on echo    I have discussed this patient's plan of care and discharge plan at IDT rounds today with Case Management, Nursing, Nursing leadership, and other members of the IDT team.    Consultants/Specialty  None    Code Status  Full Code    Disposition  The patient is not medically cleared for discharge to home or a post-acute facility.      I have placed the appropriate orders for post-discharge needs.    Review of Systems  Review of Systems   HENT:  Negative for congestion.    Eyes:  Negative for blurred vision and double vision.   Respiratory:  Positive for shortness of breath.    Cardiovascular:  Positive for leg swelling. Negative for chest pain.   Gastrointestinal:  Positive for abdominal pain.   Musculoskeletal:  Positive for joint pain and myalgias.   Skin:  Negative for rash.   Neurological:  Negative for seizures and headaches.   Psychiatric/Behavioral:  Negative for hallucinations and substance abuse.         Physical Exam  Temp:  [36.3 °C (97.3 °F)-36.7 °C (98 °F)] 36.7 °C (98 °F)  Pulse:  [64-90] 90  Resp:  [14-17] 17  BP: (133-175)/() 163/94  SpO2:  [93 %-98 %] 94 %    Physical Exam  Vitals and nursing note reviewed.   Constitutional:       General: He is not in acute distress.     Appearance: Normal appearance. He is not ill-appearing.   HENT:      Head: Normocephalic and atraumatic.      Right Ear: External ear normal.      Left Ear: External ear normal.      Nose: Nose normal. No congestion.      Mouth/Throat:      Mouth: Mucous membranes are moist.      Pharynx: No oropharyngeal exudate.   Eyes:       General: No scleral icterus.     Extraocular Movements: Extraocular movements intact.   Cardiovascular:      Rate and Rhythm: Normal rate and regular rhythm.      Pulses: Normal pulses.      Heart sounds: Normal heart sounds. No murmur heard.  Pulmonary:      Effort: Pulmonary effort is normal.      Breath sounds: Normal breath sounds. No wheezing.   Abdominal:      General: Abdomen is flat.      Palpations: Abdomen is soft.      Tenderness: There is abdominal tenderness.      Comments: Protuberant abdomen   Musculoskeletal:         General: Normal range of motion.      Cervical back: Normal range of motion and neck supple.   Skin:     General: Skin is warm.      Capillary Refill: Capillary refill takes less than 2 seconds.   Neurological:      General: No focal deficit present.      Mental Status: He is alert and oriented to person, place, and time. Mental status is at baseline.   Psychiatric:         Mood and Affect: Mood normal.         Behavior: Behavior normal.         Fluids    Intake/Output Summary (Last 24 hours) at 1/7/2025 1424  Last data filed at 1/7/2025 0620  Gross per 24 hour   Intake 400 ml   Output 1225 ml   Net -825 ml        Laboratory  Recent Labs     01/05/25  1159 01/06/25  0145 01/07/25  0306   WBC 5.6 6.2 6.0   RBC 4.16* 3.86* 3.97*   HEMOGLOBIN 12.7* 11.8* 12.1*   HEMATOCRIT 36.8* 36.2* 36.7*   MCV 88.5 93.8 92.4   MCH 30.5 30.6 30.5   MCHC 34.5 32.6 33.0   RDW 47.5 52.0* 51.0*   PLATELETCT 192 172 182   MPV 9.2 9.0 9.3     Recent Labs     01/05/25  1159 01/06/25  0145 01/07/25  0306   SODIUM 138 141 139   POTASSIUM 3.7 4.3 4.4   CHLORIDE 107 108 107   CO2 20 23 22   GLUCOSE 145* 95 92   BUN 14 14 12   CREATININE 1.27 1.27 1.25   CALCIUM 9.0 8.8 9.2     Recent Labs     01/05/25  1656 01/07/25  0306   APTT 20.1* 31.2   INR 1.04 1.10         Recent Labs     01/06/25  0145   TRIGLYCERIDE 70   HDL 41   LDL 84       Imaging  EC-ECHOCARDIOGRAM COMPLETE W/O CONT   Final Result      US-EXTREMITY  VENOUS LOWER BILAT   Final Result      CT-ABDOMEN-PELVIS WITH   Final Result      1.  There is splenomegaly. Spleen measures 18.5 cm in diameter.      2.  Otherwise no acute abnormalities are identified in the abdomen or pelvis.      CT-CTA CHEST PULMONARY ARTERY W/ RECONS   Final Result         1. Extensive bilateral pulmonary embolism including a saddle embolus. There is mild right heart strain.   2. Splenomegaly.   3. Probable fatty infiltration of the liver.      Findings were communicated to the ordering provider at the time of dictation via Voalte.            DX-CHEST-PORTABLE (1 VIEW)   Final Result      No acute cardiac or pulmonary abnormalities are identified.      IR-CONSULT AND TREAT    (Results Pending)        Assessment/Plan  * Bilateral pulmonary embolism (HCC)- (present on admission)  Assessment & Plan  Patient found to have bilateral pulmonary emboli and mild right heart strain with  Patient Jeovany score is low.  Troponin was normal limits  Elevated BNP could be secondary to history of congestive heart failure.  I ordered urgent echocardiogram.  Initially plan was to place him on heparin but due to extra volume of heparin in the setting of congestive heart failure I started him on therapeutic Lovenox.  Admit to telemetry  Lower extremity DVT on ultrasound    Heparin drip  Await IR assessment for possible intervention    Cocaine abuse (HCC)  Assessment & Plan  Cocaine abuse    Hypoxia  Assessment & Plan  On room air    Hyperglycemia  Assessment & Plan  He found to hyperglycemia.  I started him on some sliding scale with hypoglycemia protocol.  I ordered HbA1c for tomorrow.  I ordered lipid profile for tomorrow as well.    History of chronic CHF- (present on admission)  Assessment & Plan  Continue outpatient medications pain  Cardiac diet.  Recovered EF on echo    Severe obesity (BMI >= 40) (HCC)- (present on admission)  Assessment & Plan  Body mass index is 44.76 kg/m².  Counseled about diet and  exercise         VTE prophylaxis: Heparin drip    I have performed a physical exam and reviewed and updated ROS and Plan today (1/7/2025). In review of yesterday's note (1/6/2025), there are no changes except as documented above.    Greater than 51 minutes spent prepping to see patient (e.g. review of tests) obtaining and/or reviewing separately obtained history. Performing a medically appropriate examination and/ evaluation.  Counseling and educating the patient/family/caregiver.  Ordering medications, tests, or procedures.  Referring and communicating with other health care professionals.  Documenting clinical information in EPIC.  Independently interpreting results and communicating results to patient/family/caregiver.  Care coordination.

## 2025-01-07 NOTE — DISCHARGE PLANNING
Care Transition Team Assessment    Patient, Oracio Jurado, is 44-year-old admitted for leg swelling. Patient is alert and oriented x4; information was given by the patient. Please see pt's H&P for prior medical history. Patient reports living in a H, one step to enter, with his two young children; [address] [facility] [address]. Pt confirmed contacts on facesheet, sisters Cherise Gibson [phone], Bárbara Gibson [phone], and Jerica Alexis [phone]. Patient does have a PCP, Dr. Ferguson. Patient reports good support from friends. Patient does have transportation once medically cleared for discharge, reports friends can . Patient reports no concerns with food insecurity. Confirmed medical insurance is [employer]. Preferred pharmacy is [facility] in [place]. Patient is independent. Patient utilizes walker. Patient reports history with ETOH, states he quit years ago.     Patient states not believing he'll be needing post-acute placement.     Identified DC needs at this time:    None    Information Source  Orientation Level: Oriented X4  Information Given By: Patient  Who is responsible for making decisions for patient? : Patient    Readmission Evaluation  Is this a readmission?: No    Elopement Risk  Legal Hold: No  Ambulatory or Self Mobile in Wheelchair: Yes  Disoriented: No  Psychiatric Symptoms: None  History of Wandering: No  Elopement this Admit: No  Vocalizing Wanting to Leave: No  Displays Behaviors, Body Language Wanting to Leave: No-Not at Risk for Elopement  Elopement Risk: Not at Risk for Elopement    Interdisciplinary Discharge Planning  Primary Care Physician: Dr. Ferguson  Lives with - Patient's Self Care Capacity: Child Less than 18 Years of Age  Patient or legal guardian wants to designate a caregiver: No  Housing / Facility: [address]  Prior Services: Home-Independent    Discharge Preparedness  What is your plan after discharge?: Other (comment) (home)  What are  "your discharge supports?: Other (comment) (Friends)  Prior Functional Level: Ambulatory, Independent with Activities of Daily Living, Independent with Medication Management, Uses Walker    Functional Assesment  Prior Functional Level: Ambulatory, Independent with Activities of Daily Living, Independent with Medication Management, Uses Walker    Domestic Abuse  Have you ever been the victim of abuse or violence?: No  Possible Abuse/Neglect Reported to:: Not Applicable    Psychological Assessment  History of Substance Abuse: Alcohol  Date Last Used - Alcohol: Unk, patient states he quit a \"long time ago\"  History of Psychiatric Problems: No    Anticipated Discharge Information  Discharge Disposition: Discharged to home/self care (01)  "

## 2025-01-07 NOTE — PROGRESS NOTES
Received bedside report from RN Lilo, pt care assumed. VS WDL, pt assessment complete. Pt A&Ox4, no c/o pain at this time. POC discussed with pt and verbalizes no questions. Pt denies any additional needs at this time. Bed locked and in lowest position, bed/chair alarm on. Pt educated on fall risk and verbalized understanding, call light within reach, hourly rounding initiated.

## 2025-01-07 NOTE — CARE PLAN
"The patient is Watcher - Medium risk of patient condition declining or worsening    Shift Goals  Clinical Goals: Patient will verbalize understanding of POC. Patient will be NPO for possible procedure by IR tomorrow 1/7/2025. Patient's pain will remain at or below mutually defined goal; 6/10.  Patient Goals: \"Get procedure done tomorrow.\"  Family Goals: JOSE    Progress made toward(s) clinical / shift goals:  Patient provided a verbal discussion related to POC to include a verbal discussion and printed hand out on pulmonary embolism / DVT to include standard methods of treatment options for PE. Patient verbalized understanding. Patient also understands treatment options will be determined today after consult with IR. Patient has been NPO since midnight in preparation for any procedures recommended by IR. Discussed plan to initiate a heparin infusion at or around 0430 this morning as part of patient's treatment plan for PE/DVT. Provide emotional support, therapeutic presence and communication to patient for tearfulness and anxiety. Pt is able to verbalize pain on a scale of 1-10 and is able to verbalize comfort goal. Pain management plan followed and pt educated on nonpharmacologic and pharmacological comfort measures. Pt remains free from falls at this time. Safety precautions in place. Pt educated on calling for assistance when needed. Proper hand hygiene before and after patient care to ensure patient will remain free from infection.     Patient is not progressing towards the following goals: Patient at risk for impaired activity tolerance due to baseline generalized weakness and fatigue. Educated client to be out of bed and up to chair while awake and increasing ambulation distance as much as possible.       Problem: Mobility  Goal: Patient's capacity to carry out activities will improve  Outcome: Not Progressing     "

## 2025-01-07 NOTE — DOCUMENTATION QUERY
[facility]                                                                       Query Response Note      PATIENT:               EVARISTO FORTE  ACCT #:                  [id number]  MRN:                     [id number]  :                      1980  ADMIT DATE:       2025 12:10 PM  DISCH DATE:          RESPONDING  PROVIDER #:        [id number]           QUERY TEXT:    Can the diagnosis of heart failure be further clarified based on the clinical indicators and treatment??     *Note:  If you agree with a diagnosis provided, please remember to include it in your daily concurrent documentation and onto the Discharge Summary    The patient's Clinical Indicators include:  Findings:  --Patient admitted for bilateral pulmonary embolisms with saddle pulmonary embolism, hypoxia, hyperglycemia and LLE DVT  --Per H&P, pt has a past medical hx of CHF  --CXR on  admission:  Negative acute findings  --Chest CT on  admission:  Extensive bilateral pulmonary embolism including a saddle embolus, there is mild right heart      strain  --Echocardiogram :  EF 65% with normal left ventricular systolic function, normal diastolic function  --BNP on  admission:  389    Treatment:  --CXR  --Echocardiogram  --Coreg tablet 12.5mg oral bid  --Lasix tablet 20mg oral daily  --Entresto 24-26mg 1 tablet oral bid    Risk Factors:  --Bilateral PEs including saddle PE  --Acute DVT of LLE  --Hypoxemia  --ETOH abuse    Thank you,  Megan KIM, RN  Clinical   Connect via [employer]  Options provided:   -- Acute Systolic Congestive Heart Failure   -- Acute on Chronic Systolic Congestive Heart Failure   -- Chronic Systolic Congestive Heart Failure   -- Acute Diastolic Congestive Heart Failure   -- Acute on Chronic Diastolic Congestive Heart Failure   -- Chronic Diastolic Congestive Heart Failure   -- Acute Systolic &  Diastolic Congestive Heart Failure   -- Acute on Chronic Systolic & Diastolic Congestive Heart Failure   -- Chronic Systolic & Diastolic Congestive Heart Failure   -- Other explanation, (please specify other explanation)   -- Unable to Determine      Query created by: Megan Saunders on 1/7/2025 12:04 PM    RESPONSE TEXT:    Chronic Systolic Congestive Heart Failure          Electronically signed by:  APRIL PARIKH MD 1/7/2025 2:17 PM

## 2025-01-07 NOTE — THERAPY
"Occupational Therapy   Initial Evaluation     Patient Name: Oracio Jurado  Age:  44 y.o., Sex:  male  Medical Record #: [id number]  Today's Date: 1/6/2025     Precautions  Precautions: Fall Risk  Comments: Generalized weakness with bilateral foot drag, pt reports severe gout in both knees    Assessment  Patient is a 44 y.o. male with a diagnosis of bilateral PE with mild right heart strain and CHF managed with Lovenox. Pt reports BLE edema and abdominal tightness for the last 2 weeks and worsening shortness of breath. Additional factors influencing patient status / progress: PMHx includes congestive heart failure, hypertension, substance abuse, CHF, BMI 44, and pt reports gout.     During OT eval, pt presented with impaired strength, activity tolerance, balance, coordination, mobility and pain affecting pt's ADL function. Pt needed Nely for all STS, mobility and transfers with the FWW but was unsteady and intermittently had a bilateral foot drag which pt attributes to general weakness and a self-report of gout flare in both knees. Pt needed modA to adjust socks and CGA standing at the sink for g/h. Pt was receptive to education detailed below on DME recommendations. Recommend post-acute placement.     Plan    Occupational Therapy Initial Treatment Plan   Treatment Interventions: Self Care / Activities of Daily Living, Adaptive Equipment, Neuro Re-Education / Balance, Therapeutic Exercises, Therapeutic Activity  Treatment Frequency: 4 Times per Week  Duration: Until Therapy Goals Met    DC Equipment Recommendations: Unable to determine at this time  Discharge Recommendations: Recommend post-acute placement for additional occupational therapy services prior to discharge home     Subjective    \"I've been having a hard time since about before [place].\"      Objective     01/06/25 1605   Initial Contact Note    Initial Contact Note Order Received and Verified, Occupational Therapy Evaluation in Progress with Full Report " to Follow.   Prior Living Situation   Prior Services Home-Independent   Housing / Facility 1 Story House   Steps Into Home 2   Bathroom Set up Bathtub / Shower Combination;Shower Curtain   Equipment Owned Front-Wheel Walker;4-Wheel Walker   Lives with - Patient's Self Care Capacity Child Less than 18 Years of Age   Comments Pt reports he lives with his 10 and 14 y/o children who are being taken care of by their mom currently. Pt reports a significant functional decline and increased weakness since just prior to [place].   Prior Level of ADL Function   Self Feeding Independent   Grooming / Hygiene Independent   Bathing Independent   Dressing Independent   Toileting Independent   Comments Reports increased difficulty recently and a friend has been coming over almost daily to assist as needed. Was fully independent without issue prior to recent functional decline   Prior Level of IADL Function   Medication Management Independent   Laundry Independent   Kitchen Mobility Independent   Finances Independent   Home Management Independent   Shopping Independent   Prior Level Of Mobility Independent With Device in Home;Independent Without Device in Home   Driving / Transportation Relatives / Others Provide Transportation   Occupation (Pre-Hospital Vocational) Not Employed   Comments Has needed increased assistance recently   History of Falls   History of Falls No   Precautions   Precautions Fall Risk   Comments Generalized weakness with bilateral foot drag, pt reports severe gout in both knees   Vitals   Pulse Oximetry 98 %   O2 (LPM) 2   O2 Delivery Device None - Room Air   Pain   Intervention Repositioned;Rest   Pain 0 - 10 Group   Location Knee   Location Orientation Right;Left   Pain Rating Scale (NPRS)   (did not quantify)   Description Aching   Cognition    Cognition / Consciousness WDL   Level of Consciousness Alert   Comments Pleasant and cooperative   Active ROM Upper Body   Active ROM Upper Body  WDL   Strength  Upper Body   Upper Body Strength  WDL   Sensation Upper Body   Upper Extremity Sensation  Not Tested   Upper Body Muscle Tone   Upper Body Muscle Tone  WDL   Neurological Concerns   Neurological Concerns No   Coordination Upper Body   Coordination WDL   Balance Assessment   Sitting Balance (Static) Good   Sitting Balance (Dynamic) Good   Standing Balance (Static) Fair -   Standing Balance (Dynamic) Poor +   Weight Shift Sitting Fair   Weight Shift Standing Fair   Comments FWW, intermittent bilateral foot drag which pt reports is recent and he thinks due to gout   Bed Mobility    Supine to Sit Standby Assist   Scooting Standby Assist   Rolling Standby Assist   Comments HOB flat, use of bed rail   ADL Assessment   Grooming Contact Guard Assist;Standing  (oral care at sink)   Lower Body Dressing Moderate Assist  (adjust socks)   Toileting   (declined need, using urinal independently)   How much help from another person does the patient currently need...   Putting on and taking off regular lower body clothing? 2   Bathing (including washing, rinsing, and drying)? 2   Toileting, which includes using a toilet, bedpan, or urinal? 3   Putting on and taking off regular upper body clothing? 3   Taking care of personal grooming such as brushing teeth? 3   Eating meals? 4   6 Clicks Daily Activity Score 17   Functional Mobility   Sit to Stand Minimal Assist   Bed, Chair, Wheelchair Transfer Minimal Assist   Toilet Transfers Minimal Assist   Transfer Method Stand Step   Mobility EOB>toilet>sink>EOB>chair   Activity Tolerance   Sitting in Chair post   Sitting Edge of Bed 5 min   Standing 5 min total   Patient / Family Goals   Patient / Family Goal #1 To get stronger   Short Term Goals   Short Term Goal # 1 Pt will perform ADL transfers with SPV   Short Term Goal # 2 Pt will perform LBD with SPV and use of AE PRN   Short Term Goal # 3 Pt will bathe seated on shower bench with SBA   Short Term Goal # 4 Pt will perform standing g/h  with SPV   Education Group   Education Provided Adaptive Equipment   Role of Occupational Therapist Patient Response Patient;Acceptance;Explanation;Verbal Demonstration   Adaptive Equipment Patient Response Patient;Acceptance;Explanation;Handout;Verbal Demonstration;Reinforcement Needed  (education on recommendation for a shower chair, tub transfer bench and handheld shower head and potentially LBD AE if continued difficulty with distal reach, bedrail recommended as pt reports he regularly uses his nightstand for support; handout provided)   Occupational Therapy Initial Treatment Plan    Treatment Interventions Self Care / Activities of Daily Living;Adaptive Equipment;Neuro Re-Education / Balance;Therapeutic Exercises;Therapeutic Activity   Treatment Frequency 4 Times per Week   Duration Until Therapy Goals Met   Problem List   Problem List Decreased Active Daily Living Skills;Decreased Homemaking Skills;Decreased Functional Mobility;Decreased Activity Tolerance;Impaired Postural Control / Balance   Anticipated Discharge Equipment and Recommendations   DC Equipment Recommendations Unable to determine at this time   Discharge Recommendations Recommend post-acute placement for additional occupational therapy services prior to discharge home   Interdisciplinary Plan of Care Collaboration   IDT Collaboration with  Nursing   Patient Position at End of Therapy Seated;Chair Alarm On;Call Light within Reach;Tray Table within Reach;Phone within Reach   Collaboration Comments RN updated   Session Information   Date / Session Number  1/6 #1 (1/4, 1/12)

## 2025-01-07 NOTE — CARE PLAN
The patient is Stable - Low risk of patient condition declining or worsening    Shift Goals  Clinical Goals: IR consult, education, VSS  Patient Goals: IR, updates  Family Goals: maria teresa    Progress made toward(s) clinical / shift goals:      Problem: Knowledge Deficit - Standard  Goal: Patient and family/care givers will demonstrate understanding of plan of care, disease process/condition, diagnostic tests and medications  Outcome: Progressing  Note: Patient educated on all tests, medications, and procedures. All questions addressed in regards to plan of care.        Problem: Pain - Standard  Goal: Alleviation of pain or a reduction in pain to the patient’s comfort goal  Outcome: Progressing     Problem: Hemodynamics  Goal: Patient's hemodynamics, fluid balance and neurologic status will be stable or improve  Outcome: Progressing  Note: Vital signs and neurological status remain stable throughout shift.        Problem: Respiratory  Goal: Patient will achieve/maintain optimum respiratory ventilation and gas exchange  Outcome: Progressing  Note: Patient maintain oxygenation greater than 90% on room air.

## 2025-01-07 NOTE — THERAPY
Physical Therapy Contact Note    Patient Name: Oracio Jurado  Age:  44 y.o., Sex:  male  Medical Record #: [id number]  Today's Date: 1/7/2025 01/07/25 0922   Precautions   Precautions Fall Risk   Interdisciplinary Plan of Care Collaboration   IDT Collaboration with  Nursing   Collaboration Comments Chart reviewed. Pt to have IR consult due to bilat PEs and DVTs. Will hold until after consult and monitor EMR and assess when appropriate.

## 2025-01-07 NOTE — PROGRESS NOTES
Monitor summary:        Rhythm: SR   Rate: 67-80  Ectopy: (r)PAC  Measurements: .12/.08/.33        12hr chart check

## 2025-01-07 NOTE — CONSULTS
"Radiology Consult    Author: Lilian Razo D.N.P. Date & Time created: 1/7/2025  1:26 PM   Date of admission  1/5/2025  Note to reader: this note follows the APSO format rather than the historical SOAP format. Assessment and plan located at the top of the note for ease of use.    Chief Complaint  44 y.o. male admitted 1/5/2025 with   Chief Complaint   Patient presents with    Leg Swelling     BLE swelling X 2 wks  Hx: CHF    Shortness of Breath     Pt states, \"It feels like my chest is being crushed.     HPI  Oracio Jurado is a 44-year-old male with PMH significant for systolic heart failure type II, aortic atherosclerosis, HTN, gout, renal infarction, and cocaine abuse who presented for swelling of his legs and feet, shortness of breath, and chest pressure increasing over the last 2 weeks with positive cocaine use over that timeframe.  1/5/2025 CTA demonstrates\" extensive bilateral pulmonary embolism including saddle embolus with mild heart strain (RV/LV ratio 0.93).\"  1/5/2025 bilateral lower extremity ultrasound demonstrates \"acute to subacute partially occlusive thrombus in right distal popliteal, acute to subacute occlusive thrombus in 1 of 2 proximal posterior tibial veins, and extensive acute to subacute DVT in the left lower extremity. \" Patient treated with heparin infusion.  IR consulted for possible lower extremity DVT thrombectomy.-IR Dr. Clement reviewed case and imaging.    Interval History IR  1/7/2025: Patient is [facility] to [facility], seated in hospital bed, reporting \"tightness, feeling like swelling\" in his lower back and across his lower abdomen.  SpO2 94% on room air.  He denies any shortness of breath, dyspnea, or chest pain.  Absence of tenderness, tightness, or phlegmasia in the right and left thigh.  Patient notes left knee swelling x 1 to 2 weeks attributed to pre-existing gout, with pain and swelling improved with his \"gout medication\".  Right and left calves are supple, without pain, and approximately " the same diameter.  No edema, discoloration, or signs of poor perfusion noted to right or left foot.  Bilateral dorsalis pedis pulses +1.  I reviewed patient's most recent labs including WBC 6.0, Hgb 12.1, CR 1.25<1.27, GFR 72.  Coordinated IR consult with patient, interventional radiologist, hospitalist, and hospital nursing staff.    Assessment/Plan     Principal Problem:    Bilateral pulmonary embolism (HCC)  Active Problems:    Severe obesity (BMI >= 40) (HCC)    History of chronic CHF    Hyperglycemia    Hypoxia    Cocaine abuse (HCC)      Plan IR  - In the absence of massive proximal acute DVT, phlegmasia, thigh swelling, lower extremity pain (not attributed to gout), and cyanosis or other signs of circulatory compromise, this patient is not a candidate for lower extremity DVT thrombectomy at this time.  - Should clinical picture, imaging, or lab values change, please feel free to consult IR.  - Continue heparin infusion per hospitalist recommendation.  - Should patient develop contraindications to anticoagulation or antiplatelet therapy, he may be a candidate for inferior vena cava filter placement.    Thank you for allowing Interventional Radiology team to participate in the patients care, if any additonal care or requests are needed in the future please do not hesitate call or place IR order           Review of Systems  Physical Exam   Review of Systems   Constitutional:  Negative for chills, fever and malaise/fatigue.   HENT:  Negative for hearing loss.    Eyes:  Negative for blurred vision and double vision.   Respiratory:  Negative for cough, hemoptysis, sputum production and shortness of breath.    Cardiovascular:  Positive for leg swelling (Left knee). Negative for chest pain.   Gastrointestinal:  Positive for abdominal pain (Tightness across lower abdomen). Negative for constipation, nausea and vomiting.   Musculoskeletal:  Positive for back pain (Low back). Negative for myalgias.   Neurological:   Negative for headaches.   Psychiatric/Behavioral: Negative.        Vitals:    01/07/25 0752   BP: (!) 163/94   Pulse: 90   Resp: 17   Temp: 36.7 °C (98 °F)   SpO2: 94%      Physical Exam  Vitals and nursing note reviewed.   Constitutional:       General: He is not in acute distress.     Appearance: Normal appearance. He is not ill-appearing.   HENT:      Head: Atraumatic.   Cardiovascular:      Rate and Rhythm: Normal rate.   Pulmonary:      Effort: Pulmonary effort is normal. No respiratory distress.   Abdominal:      General: There is no distension.      Tenderness: There is no abdominal tenderness. There is no guarding.   Musculoskeletal:         General: Swelling (Left knee) present.      Right lower leg: No edema.      Left lower leg: No edema.   Skin:     General: Skin is warm and dry.      Coloration: Skin is not pale.   Neurological:      General: No focal deficit present.      Mental Status: He is alert and oriented to person, place, and time.   Psychiatric:         Mood and Affect: Mood normal.         Behavior: Behavior normal.             Labs reviewed by me today    Recent Labs     01/05/25  1159 01/06/25  0145 01/07/25  0306   WBC 5.6 6.2 6.0   RBC 4.16* 3.86* 3.97*   HEMOGLOBIN 12.7* 11.8* 12.1*   HEMATOCRIT 36.8* 36.2* 36.7*   MCV 88.5 93.8 92.4   MCH 30.5 30.6 30.5   MCHC 34.5 32.6 33.0   RDW 47.5 52.0* 51.0*   PLATELETCT 192 172 182   MPV 9.2 9.0 9.3     Recent Labs     01/05/25  1159 01/06/25  0145 01/07/25  0306   SODIUM 138 141 139   POTASSIUM 3.7 4.3 4.4   CHLORIDE 107 108 107   CO2 20 23 22   GLUCOSE 145* 95 92   BUN 14 14 12   CREATININE 1.27 1.27 1.25   CALCIUM 9.0 8.8 9.2     EC-ECHOCARDIOGRAM COMPLETE W/O CONT   Final Result      US-EXTREMITY VENOUS LOWER BILAT   Final Result      CT-ABDOMEN-PELVIS WITH   Final Result      1.  There is splenomegaly. Spleen measures 18.5 cm in diameter.      2.  Otherwise no acute abnormalities are identified in the abdomen or pelvis.      CT-CTA CHEST  "PULMONARY ARTERY W/ RECONS   Final Result         1. Extensive bilateral pulmonary embolism including a saddle embolus. There is mild right heart strain.   2. Splenomegaly.   3. Probable fatty infiltration of the liver.      Findings were communicated to the ordering provider at the time of dictation via Voalte.            DX-CHEST-PORTABLE (1 VIEW)   Final Result      No acute cardiac or pulmonary abnormalities are identified.      IR-CONSULT AND TREAT    (Results Pending)     Recent Labs     01/05/25  1159 01/06/25  0145 01/07/25  0306   SODIUM 138 141 139   POTASSIUM 3.7 4.3 4.4   CHLORIDE 107 108 107   CO2 20 23 22   GLUCOSE 145* 95 92   BUN 14 14 12   CPKTOTAL 324*  --   --      INR   Date Value Ref Range Status   01/07/2025 1.10 0.87 - 1.13 Final     Comment:     INR - Non-therapeutic Reference Range: 0.87-1.13  INR - Therapeutic Reference Range: 2.0-4.0       No results found for: \"POCINR\"     Intake/Output Summary (Last 24 hours) at 1/7/2025 1326  Last data filed at 1/7/2025 0620  Gross per 24 hour   Intake 400 ml   Output 1225 ml   Net -825 ml      Labs not explicitly included in this progress note were reviewed by the author. Radiology/imaging not explicitly included in this progress note was reviewed by the author.     Past Medical History:   Diagnosis Date    HF (heart failure) (HCC)     Hypertension         Home Medications    Medication Sig Taking? Last Dose Authorizing Provider   gabapentin (NEURONTIN) 100 MG Cap Take 100 mg by mouth 3 times a day. X 21 days Yes 1/5/2025 Morning Physician Outpatient   indomethacin (INDOCIN) 50 MG Cap Take 50 mg by mouth every 8 hours as needed (gout). Yes 1/4/2025 Evening Physician Outpatient   carvedilol (COREG) 6.25 MG Tab Take 6.25 mg by mouth 2 times a day with meals. Yes 1/5/2025 Physician Outpatient   Semaglutide (OZEMPIC, 0.25 OR 0.5 MG/DOSE, SC) Inject 0.25 mg under the skin every 7 days. Yes 1/1/2025 Physician Outpatient   ENTRESTO 24-26 MG Tab Take 1 tablet " by mouth twice daily Yes 1/5/2025 Morning ANDRAE Pollard.P.RSharifN.   furosemide (LASIX) 20 MG Tab Take 1 Tablet by mouth every day. Yes 1/5/2025 Morning ANDRAE Pollard.P.RHOANG.       I have performed a physical exam and reviewed and updated ROS and Plan today (1/7/2025).     59 minutes in directly providing and coordinating care and extensive data review.  No time overlap and excludes procedures.

## 2025-01-07 NOTE — HOSPITAL COURSE
Oracio Jurado is a 44 y.o. male past medical history of congestive heart failure and hypertension who presented to the hospital on 1/5/2025 with bilateral lower extremities edema abdominal tightness and shortness of breath for approximately 2 weeks. Patient reported he traveled frequently to [place] and sometime it takes several hours due to construction from [place] and his last reports approximately 2 weeks ago. CTA chest demonstrated  bilateral pulmonary emboli. Troponin is normal limits elevated BNP. Jeovany score of 2, mortality is 4.4%. No intervention necessary per critical care assessment. Lower extremity ultrasound showed acute-subacute partially occlusive thrombus in the right distal popliteal vein.  Acute-subacute occlusive thrombus in one of the 2 right proximal posterior tibial veins.  Extensive acute to subacute DVT in the left lower extremity. IR assessment found this to not be amendable to intervention. Echocardiogram show ejection fraction 60 to 65%.  No pericardial effusion.  Normal IVC size.  Normal biventricular systolic functions.  Ascending aortic diameter is 4 cm. Started on heparin drip. Transitioned to eliquis after 48 hours. PT/OT recommended post acute placement, patient refused SNF placement. Patient improved clinically and was agreeable to discharge. Patient was determined satisfactory for discharge with appropriate follow up.

## 2025-01-07 NOTE — PROGRESS NOTES
Lab called with critical Xa result of 1.04 at 1104. Critical lab result read back.   Dr. Valverde notified of critical lab result at 1110.  Critical lab result read back by Dr. Valverde. Heparin drip adjusted per protocol.

## 2025-01-08 PROBLEM — M10.9 GOUT FLARE: Status: ACTIVE | Noted: 2025-01-08

## 2025-01-08 LAB
ALBUMIN SERPL BCP-MCNC: 3.8 G/DL (ref 3.2–4.9)
BASOPHILS # BLD AUTO: 0.7 % (ref 0–1.8)
BASOPHILS # BLD: 0.04 K/UL (ref 0–0.12)
BUN SERPL-MCNC: 13 MG/DL (ref 8–22)
CALCIUM ALBUM COR SERPL-MCNC: 8.7 MG/DL (ref 8.5–10.5)
CALCIUM SERPL-MCNC: 8.5 MG/DL (ref 8.5–10.5)
CHLORIDE SERPL-SCNC: 104 MMOL/L (ref 96–112)
CO2 SERPL-SCNC: 25 MMOL/L (ref 20–33)
CREAT SERPL-MCNC: 1.18 MG/DL (ref 0.5–1.4)
EOSINOPHIL # BLD AUTO: 0.17 K/UL (ref 0–0.51)
EOSINOPHIL NFR BLD: 3 % (ref 0–6.9)
ERYTHROCYTE [DISTWIDTH] IN BLOOD BY AUTOMATED COUNT: 50.3 FL (ref 35.9–50)
GFR SERPLBLD CREATININE-BSD FMLA CKD-EPI: 77 ML/MIN/1.73 M 2
GLUCOSE SERPL-MCNC: 92 MG/DL (ref 65–99)
HCT VFR BLD AUTO: 34.9 % (ref 42–52)
HGB BLD-MCNC: 11.4 G/DL (ref 14–18)
IMM GRANULOCYTES # BLD AUTO: 0.03 K/UL (ref 0–0.11)
IMM GRANULOCYTES NFR BLD AUTO: 0.5 % (ref 0–0.9)
LYMPHOCYTES # BLD AUTO: 1.58 K/UL (ref 1–4.8)
LYMPHOCYTES NFR BLD: 27.5 % (ref 22–41)
MAGNESIUM SERPL-MCNC: 2.1 MG/DL (ref 1.5–2.5)
MCH RBC QN AUTO: 29.5 PG (ref 27–33)
MCHC RBC AUTO-ENTMCNC: 32.7 G/DL (ref 32.3–36.5)
MCV RBC AUTO: 90.2 FL (ref 81.4–97.8)
MONOCYTES # BLD AUTO: 0.59 K/UL (ref 0–0.85)
MONOCYTES NFR BLD AUTO: 10.3 % (ref 0–13.4)
NEUTROPHILS # BLD AUTO: 3.34 K/UL (ref 1.82–7.42)
NEUTROPHILS NFR BLD: 58 % (ref 44–72)
NRBC # BLD AUTO: 0 K/UL
NRBC BLD-RTO: 0 /100 WBC (ref 0–0.2)
PHOSPHATE SERPL-MCNC: 3.6 MG/DL (ref 2.5–4.5)
PLATELET # BLD AUTO: 186 K/UL (ref 164–446)
PMV BLD AUTO: 8.8 FL (ref 9–12.9)
POTASSIUM SERPL-SCNC: 4.3 MMOL/L (ref 3.6–5.5)
RBC # BLD AUTO: 3.87 M/UL (ref 4.7–6.1)
SODIUM SERPL-SCNC: 139 MMOL/L (ref 135–145)
UFH PPP CHRO-ACNC: 0.37 IU/ML
UFH PPP CHRO-ACNC: 0.44 IU/ML
URATE SERPL-MCNC: 8.3 MG/DL (ref 2.5–8.3)
WBC # BLD AUTO: 5.8 K/UL (ref 4.8–10.8)

## 2025-01-08 PROCEDURE — 80069 RENAL FUNCTION PANEL: CPT

## 2025-01-08 PROCEDURE — 97116 GAIT TRAINING THERAPY: CPT

## 2025-01-08 PROCEDURE — 700102 HCHG RX REV CODE 250 W/ 637 OVERRIDE(OP): Performed by: INTERNAL MEDICINE

## 2025-01-08 PROCEDURE — 84550 ASSAY OF BLOOD/URIC ACID: CPT

## 2025-01-08 PROCEDURE — 99233 SBSQ HOSP IP/OBS HIGH 50: CPT | Performed by: INTERNAL MEDICINE

## 2025-01-08 PROCEDURE — 700102 HCHG RX REV CODE 250 W/ 637 OVERRIDE(OP)

## 2025-01-08 PROCEDURE — 700111 HCHG RX REV CODE 636 W/ 250 OVERRIDE (IP): Performed by: INTERNAL MEDICINE

## 2025-01-08 PROCEDURE — 85025 COMPLETE CBC W/AUTO DIFF WBC: CPT

## 2025-01-08 PROCEDURE — 85520 HEPARIN ASSAY: CPT

## 2025-01-08 PROCEDURE — A9270 NON-COVERED ITEM OR SERVICE: HCPCS

## 2025-01-08 PROCEDURE — A9270 NON-COVERED ITEM OR SERVICE: HCPCS | Performed by: INTERNAL MEDICINE

## 2025-01-08 PROCEDURE — 36415 COLL VENOUS BLD VENIPUNCTURE: CPT

## 2025-01-08 PROCEDURE — 97163 PT EVAL HIGH COMPLEX 45 MIN: CPT

## 2025-01-08 PROCEDURE — 770020 HCHG ROOM/CARE - TELE (206)

## 2025-01-08 PROCEDURE — 83735 ASSAY OF MAGNESIUM: CPT

## 2025-01-08 RX ORDER — POLYETHYLENE GLYCOL 3350 17 G/17G
1 POWDER, FOR SOLUTION ORAL
Status: DISCONTINUED | OUTPATIENT
Start: 2025-01-08 | End: 2025-01-09 | Stop reason: HOSPADM

## 2025-01-08 RX ORDER — GABAPENTIN 100 MG/1
200 CAPSULE ORAL 3 TIMES DAILY
Status: DISCONTINUED | OUTPATIENT
Start: 2025-01-08 | End: 2025-01-09 | Stop reason: HOSPADM

## 2025-01-08 RX ORDER — AMOXICILLIN 250 MG
2 CAPSULE ORAL EVERY EVENING
Status: DISCONTINUED | OUTPATIENT
Start: 2025-01-08 | End: 2025-01-09 | Stop reason: HOSPADM

## 2025-01-08 RX ADMIN — SACUBITRIL AND VALSARTAN 1 TABLET: 24; 26 TABLET, FILM COATED ORAL at 17:32

## 2025-01-08 RX ADMIN — SACUBITRIL AND VALSARTAN 1 TABLET: 24; 26 TABLET, FILM COATED ORAL at 05:48

## 2025-01-08 RX ADMIN — POLYETHYLENE GLYCOL 3350 1 PACKET: 17 POWDER, FOR SOLUTION ORAL at 10:30

## 2025-01-08 RX ADMIN — OXYCODONE 5 MG: 5 TABLET ORAL at 20:21

## 2025-01-08 RX ADMIN — PREDNISONE 30 MG: 10 TABLET ORAL at 12:55

## 2025-01-08 RX ADMIN — SENNOSIDES AND DOCUSATE SODIUM 2 TABLET: 50; 8.6 TABLET ORAL at 17:33

## 2025-01-08 RX ADMIN — APIXABAN 10 MG: 5 TABLET, FILM COATED ORAL at 08:21

## 2025-01-08 RX ADMIN — CARVEDILOL 12.5 MG: 12.5 TABLET, FILM COATED ORAL at 08:21

## 2025-01-08 RX ADMIN — GABAPENTIN 100 MG: 100 CAPSULE ORAL at 05:49

## 2025-01-08 RX ADMIN — FUROSEMIDE 20 MG: 20 TABLET ORAL at 05:49

## 2025-01-08 RX ADMIN — GABAPENTIN 200 MG: 100 CAPSULE ORAL at 12:07

## 2025-01-08 RX ADMIN — GABAPENTIN 200 MG: 100 CAPSULE ORAL at 17:32

## 2025-01-08 RX ADMIN — OXYCODONE 5 MG: 5 TABLET ORAL at 05:46

## 2025-01-08 RX ADMIN — APIXABAN 10 MG: 5 TABLET, FILM COATED ORAL at 17:32

## 2025-01-08 RX ADMIN — OXYCODONE 5 MG: 5 TABLET ORAL at 14:14

## 2025-01-08 ASSESSMENT — COGNITIVE AND FUNCTIONAL STATUS - GENERAL
DRESSING REGULAR LOWER BODY CLOTHING: A LOT
DAILY ACTIVITIY SCORE: 17
SUGGESTED CMS G CODE MODIFIER MOBILITY: CK
STANDING UP FROM CHAIR USING ARMS: A LITTLE
TURNING FROM BACK TO SIDE WHILE IN FLAT BAD: A LOT
MOVING FROM LYING ON BACK TO SITTING ON SIDE OF FLAT BED: A LOT
MOBILITY SCORE: 11
WALKING IN HOSPITAL ROOM: A LOT
TOILETING: A LOT
HELP NEEDED FOR BATHING: A LOT
DRESSING REGULAR UPPER BODY CLOTHING: A LITTLE
CLIMB 3 TO 5 STEPS WITH RAILING: TOTAL
TURNING FROM BACK TO SIDE WHILE IN FLAT BAD: A LITTLE
MOVING TO AND FROM BED TO CHAIR: A LITTLE
WALKING IN HOSPITAL ROOM: A LITTLE
SUGGESTED CMS G CODE MODIFIER DAILY ACTIVITY: CK
STANDING UP FROM CHAIR USING ARMS: A LOT
CLIMB 3 TO 5 STEPS WITH RAILING: TOTAL
MOBILITY SCORE: 16
MOVING FROM LYING ON BACK TO SITTING ON SIDE OF FLAT BED: A LITTLE
SUGGESTED CMS G CODE MODIFIER MOBILITY: CL
MOVING TO AND FROM BED TO CHAIR: A LOT

## 2025-01-08 ASSESSMENT — ENCOUNTER SYMPTOMS
HALLUCINATIONS: 0
BLURRED VISION: 0
ABDOMINAL PAIN: 0
HEADACHES: 0
DOUBLE VISION: 0
SEIZURES: 0
CONSTIPATION: 1
SHORTNESS OF BREATH: 1
MYALGIAS: 0

## 2025-01-08 ASSESSMENT — PAIN DESCRIPTION - PAIN TYPE
TYPE: ACUTE PAIN
TYPE: ACUTE PAIN
TYPE: CHRONIC PAIN
TYPE: ACUTE PAIN

## 2025-01-08 ASSESSMENT — LIFESTYLE VARIABLES: SUBSTANCE_ABUSE: 0

## 2025-01-08 ASSESSMENT — GAIT ASSESSMENTS
DISTANCE (FEET): 10
GAIT LEVEL OF ASSIST: MINIMAL ASSIST
ASSISTIVE DEVICE: FRONT WHEEL WALKER

## 2025-01-08 ASSESSMENT — FIBROSIS 4 INDEX: FIB4 SCORE: 0.87

## 2025-01-08 NOTE — THERAPY
Physical Therapy   Initial Evaluation     Patient Name: Oracio Jurado  Age:  44 y.o., Sex:  male  Medical Record #: [id number]  Today's Date: 1/8/2025     Precautions  Precautions: Fall Risk  Comments: Gout L knee and R ankle today    Assessment  Patient is 44 y.o. male with BLE swelling x2 wks, SOB. Dx'd with bilateral pulmonary emboli and mild right heart strain, hypoxia, bilat DVTs.  Hx of CHF, HTN, cocaine abuse, severe obesity.    Pt presents with the following Problems: Pain, Impaired Bed Mobility, Impaired Transfers, Impaired Ambulation, Decreased Activity Tolerance, Functional Strength Deficit, Impaired Balance, Functional ROM Deficit. Please see flowsheet below for information. Pt is currently below their functional baseline and anticipate that pt will benefit from post acute placement upon DC from hospital. Pt wanting to go home, provided education on benefits of post acute and pt stated he will think about it. Updated RN and CM. Pt will also benefit from nursing to assist pt with mobility to include the following: up to chair for meals and ambulate to bathroom as needed.      Plan    Physical Therapy Initial Treatment Plan   Treatment Plan : Bed Mobility, Gait Training, Neuro Re-Education / Balance, Therapeutic Activities, Therapeutic Exercise, Stair Training, Self Care / Home Evaluation  Treatment Frequency: 4 Times per Week  Duration: Until Therapy Goals Met    DC Equipment Recommendations: None  Discharge Recommendations: Recommend post-acute placement for additional physical therapy services prior to discharge home       Subjective    Pt agreed to PT.      Objective       01/08/25 1031   Time In/Time Out   Therapy Start Time 0953   Therapy End Time 1031   Total Therapy Time 38   Initial Contact Note    Initial Contact Note Order Received and Verified, Physical Therapy Evaluation in Progress with Full Report to Follow.   Precautions   Precautions Fall Risk   Comments Gout L knee and R ankle today    Vitals   Room Air Oximetry 95  (with activity)   Pain 0 - 10 Group   Location Ankle;Knee   Location Orientation Left;Right  (R ankle (new today), L knee, pt stated it feels like gout exacerbation)   Therapist Pain Assessment Nurse Notified;Post Activity Pain Same as Prior to Activity;During Activity  (Rn stated she will talk to MD about possible medication for gout. His normal medication for gout was held due to interaction with heparin.)   Prior Living Situation   Prior Services Home-Independent   Housing / Facility 1 Story House   Steps Into Home 1  (threshold)   Bathroom Set up Bathtub / Shower Combination;Shower Curtain   Equipment Owned Front-Wheel Walker;4-Wheel Walker   Lives with - Patient's Self Care Capacity Child Less than 18 Years of Age   Comments Pt reports he lives with his 10 and 14 y/o children who are being taken care of by their mom currently. Pt reports a significant functional decline and increased weakness since just prior to Christmas. Has a lot of friends who can assist him at home, including a nurse that works at the SNF in his area.   Prior Level of Functional Mobility   Comments was using walker recently   History of Falls   History of Falls No   Cognition    Cognition / Consciousness WDL   Level of Consciousness Alert   Comments Pleasant, cooperative   Active ROM Lower Body    Active ROM Lower Body  X   Comments limited knee flexion bilat to approximately 90 degrees, L ankle dorsiflexion limited to neutral- due to possible gout flare up   Strength Lower Body   Lower Body Strength  X   Gross Strength Generalized Weakness, Equal Bilaterally   Comments due to pain   Sensation Lower Body   Lower Extremity Sensation   X   Comments c/o N/T when in one position too long at lower extremities   Coordination Lower Body    Coordination Lower Body  WDL   Other Treatments   Other Treatments Provided used bariatric walker during session   Balance Assessment   Sitting Balance (Static) Good   Sitting  Balance (Dynamic) Good   Standing Balance (Static) Fair -   Standing Balance (Dynamic) Poor +   Weight Shift Sitting Good   Weight Shift Standing Fair   Comments heavy reliance on FWW with standing   Bed Mobility    Supine to Sit Supervised   Scooting Supervised   Rolling Supervised   Comments HOB up, extra time   Gait Analysis   Gait Level Of Assist Minimal Assist   Assistive Device Front Wheel Walker   Distance (Feet) 10   # of Times Distance was Traveled 2   Deviation Bradykinetic;Shuffled Gait;Decreased Heel Strike;Decreased Toe Off;Increased Base Of Support;Step To  (step to leaning with R LE, heavy reliance on FWW, dragging LEs at times)   # of Stairs Climbed 0  (attempted up 1 3inch step with bilat rails- pt u/a to do due to pain)   Comments education to breathe with mobility   Functional Mobility   Sit to Stand Minimal Assist  (from EOB, pt uses folded up pillow to push off with R hand and rocking technique)   Bed, Chair, Wheelchair Transfer Contact Guard Assist  (to recliner)   Comments pt forward flexes at hips to initiate stand and then pushes up through walker to stand fully upright   6 Clicks Assessment - How much HELP from from another person do you currently need... (If the patient hasn't done an activity recently, how much help from another person do you think he/she would need if he/she tried?)   Turning from your back to your side while in a flat bed without using bedrails? 3   Moving from lying on your back to sitting on the side of a flat bed without using bedrails? 3   Moving to and from a bed to a chair (including a wheelchair)? 3   Standing up from a chair using your arms (e.g., wheelchair, or bedside chair)? 3   Walking in hospital room? 3   Climbing 3-5 steps with a railing? 1   6 clicks Mobility Score 16   Activity Tolerance   Sitting in Chair post session   Standing 8 minutes total   Comments limited by pain and fatigue   Edema / Skin Assessment   Edema / Skin  X   Comments bilat LE edema    Patient / Family Goals    Patient / Family Goal #1 To go home   Short Term Goals    Short Term Goal # 1 Pt will perform supine to/from sit with flat bed and no features supervised in 6 visits to progress bed mobility   Short Term Goal # 2 Pt will perform sit to/from stand with FWW supervised in 6 visits to progress transfers   Short Term Goal # 3 Pt will amb with FWW 200ft supervised in 6 visits to progress with functional household mobility.   Short Term Goal # 4 Pt will be able to ascend/descend 1 step with FWW and CGA in 6 visits to access home/community   Education Group   Education Provided Role of Physical Therapist;Gait Training;Stair Training;Use of Assistive Device   Role of Physical Therapist Patient Response Patient;Acceptance;Explanation;Verbal Demonstration   Gait Training Patient Response Patient;Acceptance;Explanation;Action Demonstration   Stair Training Patient Response Patient;Acceptance;Explanation;Verbal Demonstration   Use of Assistive Device Patient Response Patient;Acceptance;Explanation;Action Demonstration   Additional Comments Education on benefits of post acute rehab   Physical Therapy Initial Treatment Plan    Treatment Plan  Bed Mobility;Gait Training;Neuro Re-Education / Balance;Therapeutic Activities;Therapeutic Exercise;Stair Training;Self Care / Home Evaluation   Treatment Frequency 4 Times per Week   Duration Until Therapy Goals Met   Problem List    Problems Pain;Impaired Bed Mobility;Impaired Transfers;Impaired Ambulation;Decreased Activity Tolerance;Functional Strength Deficit;Impaired Balance;Functional ROM Deficit   Anticipated Discharge Equipment and Recommendations   DC Equipment Recommendations None   Discharge Recommendations Recommend post-acute placement for additional physical therapy services prior to discharge home   Interdisciplinary Plan of Care Collaboration   IDT Collaboration with  Nursing   Patient Position at End of Therapy Seated;Chair Alarm On;Call Light  within Reach;Tray Table within Reach;Phone within Reach   Collaboration Comments RN updated

## 2025-01-08 NOTE — CARE PLAN
"The patient is Stable - Low risk of patient condition declining or worsening    Shift Goals  Clinical Goals: Patient will verbalize understanding of POC. Patient's pain will remain well controlled at mutually defined goal of 6/10.  Patient Goals: \"Get my home medication added to my list of meds for my knee pain.\"\  Family Goals: JOSE    Progress made toward(s) clinical / shift goals:  Patient provided a verbal discussion related to POC and verbalized understanding. Pt is able to verbalize pain on a scale of 1-10 and is able to verbalize comfort goal. Pain management plan followed and pt educated on nonpharmacologic and pharmacological comfort measures. Pt remains free from falls at this time. Safety precautions in place. Pt educated on calling for assistance when needed. Provided turns as requested by patient for repositioning PRN. Proper hand hygiene before and after patient care to ensure patient will remain free from infection. Encouraged patient to be EOB or up to chair during waking hours and increasing activity tolerance as tolerated. Patient's main difficulty with ambulation is due to pain.     Patient is not progressing towards the following goals: Patient is a max 3-person assist from bed to chair using a FWW. Recommend patient not be be moved from bed to chair/toilet and back by using a Lian vs a stand pivot.       "

## 2025-01-08 NOTE — PROGRESS NOTES
Received bedside report from RUFUS Means, pt care assumed. VS WDL, pt assessment complete. Pt A&Ox4, c/o 7/10 pain at this time. POC discussed with pt and verbalizes no questions. Pt denies any additional needs at this time. Bed locked and in lowest position, bed alarm on. Pt educated on fall risk and verbalized understanding, call light within reach, hourly rounding initiated.

## 2025-01-08 NOTE — DISCHARGE PLANNING
LOKESH met with patient at bedside to discuss DCP. Per patient, understands PT/OT rec. Verbalizes preference is to go home but is willing to see if [facility] is able to accept him. Patient signed choice form. LOKESH faxed choice form to [facility].    Addendum @ 2435: LOKESH reached out to [facility] to ensure referral had been received. On hold for 5 minutes. Was advised by [facility] staff to reach out to admissions coordinator directly. LOKESH called [facility] [phone]. Per Clary, patient's referral has been received. Will begin reviewing shortly.    Addendum @ 5825: LOKESH attempted to reach out to Clary with [facility] regarding pending status. LOKESH left a  requesting call back.

## 2025-01-09 VITALS
BODY MASS INDEX: 42.66 KG/M2 | HEART RATE: 60 BPM | SYSTOLIC BLOOD PRESSURE: 172 MMHG | RESPIRATION RATE: 20 BRPM | DIASTOLIC BLOOD PRESSURE: 92 MMHG | TEMPERATURE: 97.7 F | OXYGEN SATURATION: 90 % | WEIGHT: 315 LBS | HEIGHT: 72 IN

## 2025-01-09 PROCEDURE — 700102 HCHG RX REV CODE 250 W/ 637 OVERRIDE(OP): Performed by: INTERNAL MEDICINE

## 2025-01-09 PROCEDURE — 700111 HCHG RX REV CODE 636 W/ 250 OVERRIDE (IP): Performed by: INTERNAL MEDICINE

## 2025-01-09 PROCEDURE — 99239 HOSP IP/OBS DSCHRG MGMT >30: CPT | Performed by: INTERNAL MEDICINE

## 2025-01-09 PROCEDURE — A9270 NON-COVERED ITEM OR SERVICE: HCPCS | Performed by: INTERNAL MEDICINE

## 2025-01-09 RX ORDER — GABAPENTIN 100 MG/1
200 CAPSULE ORAL 3 TIMES DAILY
Qty: 90 CAPSULE | Refills: 1 | Status: ON HOLD | OUTPATIENT
Start: 2025-01-09

## 2025-01-09 RX ORDER — PREDNISONE 1 MG/1
TABLET ORAL
Qty: 3 TABLET | Refills: 0 | Status: SHIPPED | OUTPATIENT
Start: 2025-01-10 | End: 2025-01-12

## 2025-01-09 RX ADMIN — FUROSEMIDE 20 MG: 20 TABLET ORAL at 04:52

## 2025-01-09 RX ADMIN — GABAPENTIN 200 MG: 100 CAPSULE ORAL at 04:52

## 2025-01-09 RX ADMIN — CARVEDILOL 12.5 MG: 12.5 TABLET, FILM COATED ORAL at 07:49

## 2025-01-09 RX ADMIN — PREDNISONE 30 MG: 10 TABLET ORAL at 04:52

## 2025-01-09 RX ADMIN — OXYCODONE 5 MG: 5 TABLET ORAL at 07:52

## 2025-01-09 RX ADMIN — OXYCODONE 5 MG: 5 TABLET ORAL at 04:51

## 2025-01-09 RX ADMIN — APIXABAN 10 MG: 5 TABLET, FILM COATED ORAL at 04:52

## 2025-01-09 RX ADMIN — SACUBITRIL AND VALSARTAN 1 TABLET: 24; 26 TABLET, FILM COATED ORAL at 04:52

## 2025-01-09 SDOH — ECONOMIC STABILITY: TRANSPORTATION INSECURITY
IN THE PAST 12 MONTHS, HAS LACK OF RELIABLE TRANSPORTATION KEPT YOU FROM MEDICAL APPOINTMENTS, MEETINGS, WORK OR FROM GETTING THINGS NEEDED FOR DAILY LIVING?: NO

## 2025-01-09 SDOH — ECONOMIC STABILITY: TRANSPORTATION INSECURITY
IN THE PAST 12 MONTHS, HAS THE LACK OF TRANSPORTATION KEPT YOU FROM MEDICAL APPOINTMENTS OR FROM GETTING MEDICATIONS?: NO

## 2025-01-09 ASSESSMENT — SOCIAL DETERMINANTS OF HEALTH (SDOH)
WITHIN THE PAST 12 MONTHS, THE FOOD YOU BOUGHT JUST DIDN'T LAST AND YOU DIDN'T HAVE MONEY TO GET MORE: NEVER TRUE
WITHIN THE PAST 12 MONTHS, YOU WORRIED THAT YOUR FOOD WOULD RUN OUT BEFORE YOU GOT THE MONEY TO BUY MORE: NEVER TRUE
IN THE PAST 12 MONTHS, HAS THE ELECTRIC, GAS, OIL, OR WATER COMPANY THREATENED TO SHUT OFF SERVICE IN YOUR HOME?: NO

## 2025-01-09 ASSESSMENT — PAIN DESCRIPTION - PAIN TYPE
TYPE: ACUTE PAIN

## 2025-01-09 ASSESSMENT — FIBROSIS 4 INDEX: FIB4 SCORE: 0.87

## 2025-01-09 NOTE — CARE PLAN
The patient is Stable - Low risk of patient condition declining or worsening    Shift Goals  Clinical Goals: pain control, anticoags,  Patient Goals: comfort,  Family Goals: JOSE    Progress made toward(s) clinical / shift goals:        Problem: Knowledge Deficit - Standard  Goal: Patient and family/care givers will demonstrate understanding of plan of care, disease process/condition, diagnostic tests and medications  Outcome: Progressing  Note: Patient verbalizes understanding of plan of care. Asking pertinenet questions and actively participating in care.      Problem: Pain - Standard  Goal: Alleviation of pain or a reduction in pain to the patient’s comfort goal  Outcome: Progressing  Note: Pain is variable throughout the shift, between 3-10. Will use non-pharmacological pain interventions ; will use pharmacological interventions per MAR. Will reassess in 2 hours.      Problem: Fall Risk  Goal: Patient will remain free from falls  Outcome: Progressing  Note: Pt safety precautions in place; bed in lowest lock position, 2 side rails up, non slip socks on, call light within reach- educated on when and how to use call light.        Patient is not progressing towards the following goals:

## 2025-01-09 NOTE — PROGRESS NOTES
Recieved report from RN. Assumed pt care. Patient on tele box, RA, and spO2 is at 91%. Patient A&O x4. Fall precautions in place, call light and belongings within reach, bed in lowest position. No signs of distress.

## 2025-01-09 NOTE — PROGRESS NOTES
"Hospital Medicine Daily Progress Note    Date of Service  1/8/2025    Chief Complaint  Oracio Jurado is a 44 y.o. male admitted 1/5/2025 with   Chief Complaint   Patient presents with    Leg Swelling     BLE swelling X 2 wks  Hx: CHF    Shortness of Breath     Pt states, \"It feels like my chest is being crushed.         Hospital Course  Oracio Jurado is a 44 y.o. male past medical history of congestive heart failure and hypertension who presented to the hospital on 1/5/2025 with bilateral lower extremities edema abdominal tightness and shortness of breath for approximately 2 weeks.  He reported he has been noticing leg edema and tightness around his abdomen.  He reported that his shortness of breath getting worse and he is unable to talk fast and he has to take a deep breath and before talking and completing sentences.  He tried to increase his dose of Lasix for his lower extremities edema but it did not help any symptoms of edema.  He expressed that he used cocaine on [place] but he does not use it on regular basis.  He does not drink alcohol and excessive amount.  He denies any other acute complaints or associated symptoms.  There is no specific aggravating or alleviating factors.     Patient reported he traveled frequently to [place] and sometime it takes several hours due to construction from [place] and his last reports approximately 2 weeks ago.     ER course: Patient found to have bilateral pulmonary emboli.  I requested evaluation from critical care Dr. Pavon for possible intervention for his pulmonary emboli.  Troponin is normal limits elevated BNP  Patient was noted to have a Jeovany score of 2, mortality is 4.4%    Lower extremity ultrasound showed acute-subacute partially occlusive thrombus in the right distal popliteal vein.  Acute-subacute occlusive thrombus in one of the 2 right proximal posterior tibial veins.  Extensive acute to subacute DVT in the left lower extremity.    Echocardiogram " show ejection fraction 60 to 65%.  No pericardial effusion.  Normal IVC size.  Normal biventricular systolic functions.  Ascending aortic diameter is 4 cm      Interval Problem Update  Patient was seen and examined at bedside.  No acute events overnight. Patient is resting comfortably in bed and in no acute distress.     EF has recovered on echo  Transitioned to eliquis  Uric acid ordered  Started prednisone for presumed gout flare    I have discussed this patient's plan of care and discharge plan at IDT rounds today with Case Management, Nursing, Nursing leadership, and other members of the IDT team.    Consultants/Specialty  None    Code Status  Full Code    Disposition  The patient is not medically cleared for discharge to home or a post-acute facility.      I have placed the appropriate orders for post-discharge needs.    Review of Systems  Review of Systems   HENT:  Negative for congestion.    Eyes:  Negative for blurred vision and double vision.   Respiratory:  Positive for shortness of breath.    Cardiovascular:  Positive for leg swelling. Negative for chest pain.   Gastrointestinal:  Positive for constipation. Negative for abdominal pain.   Musculoskeletal:  Positive for joint pain. Negative for myalgias.   Skin:  Negative for rash.   Neurological:  Negative for seizures and headaches.   Psychiatric/Behavioral:  Negative for hallucinations and substance abuse.         Physical Exam  Temp:  [36.3 °C (97.3 °F)-36.6 °C (97.9 °F)] 36.3 °C (97.3 °F)  Pulse:  [62-71] 66  Resp:  [16-18] 16  BP: (125-167)/() 125/83  SpO2:  [90 %-97 %] 92 %    Physical Exam  Vitals and nursing note reviewed.   Constitutional:       General: He is not in acute distress.     Appearance: Normal appearance. He is not ill-appearing.   HENT:      Head: Normocephalic and atraumatic.      Right Ear: External ear normal.      Left Ear: External ear normal.      Nose: Nose normal. No congestion.      Mouth/Throat:      Mouth: Mucous  membranes are moist.      Pharynx: No oropharyngeal exudate.   Eyes:      General: No scleral icterus.     Extraocular Movements: Extraocular movements intact.   Cardiovascular:      Rate and Rhythm: Normal rate and regular rhythm.      Pulses: Normal pulses.      Heart sounds: Normal heart sounds. No murmur heard.  Pulmonary:      Effort: Pulmonary effort is normal.      Breath sounds: Normal breath sounds. No wheezing.   Abdominal:      General: Abdomen is flat.      Palpations: Abdomen is soft.      Tenderness: There is abdominal tenderness.      Comments: Protuberant abdomen   Musculoskeletal:         General: Normal range of motion.      Cervical back: Normal range of motion and neck supple.   Skin:     General: Skin is warm.      Capillary Refill: Capillary refill takes less than 2 seconds.   Neurological:      General: No focal deficit present.      Mental Status: He is alert and oriented to person, place, and time. Mental status is at baseline.   Psychiatric:         Mood and Affect: Mood normal.         Behavior: Behavior normal.         Fluids    Intake/Output Summary (Last 24 hours) at 1/8/2025 1602  Last data filed at 1/8/2025 1300  Gross per 24 hour   Intake 1040 ml   Output 1800 ml   Net -760 ml        Laboratory  Recent Labs     01/06/25  0145 01/07/25  0306 01/08/25  0059   WBC 6.2 6.0 5.8   RBC 3.86* 3.97* 3.87*   HEMOGLOBIN 11.8* 12.1* 11.4*   HEMATOCRIT 36.2* 36.7* 34.9*   MCV 93.8 92.4 90.2   MCH 30.6 30.5 29.5   MCHC 32.6 33.0 32.7   RDW 52.0* 51.0* 50.3*   PLATELETCT 172 182 186   MPV 9.0 9.3 8.8*     Recent Labs     01/06/25  0145 01/07/25  0306 01/08/25  0059   SODIUM 141 139 139   POTASSIUM 4.3 4.4 4.3   CHLORIDE 108 107 104   CO2 23 22 25   GLUCOSE 95 92 92   BUN 14 12 13   CREATININE 1.27 1.25 1.18   CALCIUM 8.8 9.2 8.5     Recent Labs     01/05/25  1656 01/07/25  0306   APTT 20.1* 31.2   INR 1.04 1.10         Recent Labs     01/06/25  0145   TRIGLYCERIDE 70   HDL 41   LDL 84        Imaging  EC-ECHOCARDIOGRAM COMPLETE W/O CONT   Final Result      US-EXTREMITY VENOUS LOWER BILAT   Final Result      CT-ABDOMEN-PELVIS WITH   Final Result      1.  There is splenomegaly. Spleen measures 18.5 cm in diameter.      2.  Otherwise no acute abnormalities are identified in the abdomen or pelvis.      CT-CTA CHEST PULMONARY ARTERY W/ RECONS   Final Result         1. Extensive bilateral pulmonary embolism including a saddle embolus. There is mild right heart strain.   2. Splenomegaly.   3. Probable fatty infiltration of the liver.      Findings were communicated to the ordering provider at the time of dictation via Voalte.            DX-CHEST-PORTABLE (1 VIEW)   Final Result      No acute cardiac or pulmonary abnormalities are identified.           Assessment/Plan  * Bilateral pulmonary embolism (HCC)- (present on admission)  Assessment & Plan  Patient found to have bilateral pulmonary emboli and mild right heart strain with  Patient Jeovany score is low.  Troponin was normal limits  Elevated BNP could be secondary to history of congestive heart failure.  I ordered urgent echocardiogram.  Initially plan was to place him on heparin but due to extra volume of heparin in the setting of congestive heart failure I started him on therapeutic Lovenox.  Admit to telemetry  Lower extremity DVT on ultrasound  No intervention necessary per IR  Transitioned to eliquis    Gout flare  Assessment & Plan  Uric acid ordered  Started prednisone for presumed gout flare    Cocaine abuse (HCC)  Assessment & Plan  Cocaine abuse    Hypoxia  Assessment & Plan  On room air    Hyperglycemia  Assessment & Plan  He found to hyperglycemia.  I started him on some sliding scale with hypoglycemia protocol.  I ordered HbA1c for tomorrow.  I ordered lipid profile for tomorrow as well.    History of chronic CHF- (present on admission)  Assessment & Plan  Continue outpatient medications pain  Cardiac diet.  Recovered EF on echo    Severe  obesity (BMI >= 40) ([facility])- (present on admission)  Assessment & Plan  Body mass index is 44.76 kg/m².  Counseled about diet and exercise         VTE prophylaxis: Heparin drip    I have performed a physical exam and reviewed and updated ROS and Plan today (1/8/2025). In review of yesterday's note (1/7/2025), there are no changes except as documented above.    Greater than 53 minutes spent prepping to see patient (e.g. review of tests) obtaining and/or reviewing separately obtained history. Performing a medically appropriate examination and/ evaluation.  Counseling and educating the patient/family/caregiver.  Ordering medications, tests, or procedures.  Referring and communicating with other health care professionals.  Documenting clinical information in EPIC.  Independently interpreting results and communicating results to patient/family/caregiver.  Care coordination.

## 2025-01-09 NOTE — DISCHARGE PLANNING
SW met with patient at bedside to discuss [facility] declining patient due no bed availability (by payor type). SW offered patient other SNFs that could possibly accept as [facility] has now declined. Patient instantly said that he would rather go home. Patient expresses frustration with his health and not being able to do something about it. Patient reports he has no grievances or complaints over anything. He states he knows medical team and staff are trying to help him but states he believes he will either remain the same or get worse at any other facilities. Reported if he was going anywhere, it would be home with the support of his friends. Per patient, he has 3-4 friends coming in today to visit him and they may be able to provide transport home. LOKESH informed patient that if they cannot provide transport, SW is able to assist.

## 2025-01-09 NOTE — PROGRESS NOTES
Patient discharging home with family and friend support. DC education provided, patient verbalizes understanding. Medications sent to pts home pharmacy due to cost, educated on importance of taking all prescribed medications. Also discussed following up with primary care provider and discussing new meds and weight loss options. Home medications picked up from inpatient pharmacy and delivered bedside. IV removed, tele box removed, monitor room notified. Patient to be wheeled down to friends car upon arrival.

## 2025-01-09 NOTE — DISCHARGE SUMMARY
"Discharge Summary    CHIEF COMPLAINT ON ADMISSION  Chief Complaint   Patient presents with    Leg Swelling     BLE swelling X 2 wks  Hx: CHF    Shortness of Breath     Pt states, \"It feels like my chest is being crushed.       Reason for Admission  Abd Pain     Admission Date  1/5/2025    CODE STATUS  Prior    HPI & HOSPITAL COURSE  Oracio Jurado is a 44 y.o. male past medical history of congestive heart failure and hypertension who presented to the hospital on 1/5/2025 with bilateral lower extremities edema abdominal tightness and shortness of breath for approximately 2 weeks. Patient reported he traveled frequently to [place] and sometime it takes several hours due to construction from [place] and his last reports approximately 2 weeks ago. CTA chest demonstrated  bilateral pulmonary emboli. Troponin is normal limits elevated BNP. Jeovany score of 2, mortality is 4.4%. No intervention necessary per critical care assessment. Lower extremity ultrasound showed acute-subacute partially occlusive thrombus in the right distal popliteal vein.  Acute-subacute occlusive thrombus in one of the 2 right proximal posterior tibial veins.  Extensive acute to subacute DVT in the left lower extremity. IR assessment found this to not be amendable to intervention. Echocardiogram show ejection fraction 60 to 65%.  No pericardial effusion.  Normal IVC size.  Normal biventricular systolic functions.  Ascending aortic diameter is 4 cm. Started on heparin drip. Transitioned to eliquis after 48 hours. PT/OT recommended post acute placement, patient refused SNF placement. Patient improved clinically and was agreeable to discharge. Patient was determined satisfactory for discharge with appropriate follow up.    Therefore, he is discharged in fair and stable condition to home with close outpatient follow-up.    The patient met 2-midnight criteria for an inpatient stay at the time of discharge.    Discharge Date  1/9/2025    FOLLOW UP " ITEMS POST DISCHARGE  Please follow up with PCP in 3-5 days for post hospitalization follow up and medication reconciliation.     DISCHARGE DIAGNOSES  Principal Problem:    Bilateral pulmonary embolism (HCC) (POA: Yes)  Active Problems:    Gout flare (POA: Unknown)    Severe obesity (BMI >= 40) (HCC) (POA: Yes)    History of chronic CHF (POA: Yes)    Hyperglycemia (POA: Unknown)    Hypoxia (POA: Unknown)    Cocaine abuse (HCC) (POA: Unknown)  Resolved Problems:    * No resolved hospital problems. *      FOLLOW UP  No future appointments.  Primary Care Provider  Olivia Maurer  Schedule an appointment as soon as possible for a visit      [id number] [place] [address] [phone]  Go on 1/16/2025  Appts and Walk-Ins M-F 8am-5pm  Sliding Scale      MEDICATIONS ON DISCHARGE     Medication List        START taking these medications        Instructions   * apixaban 5mg Tabs  Commonly known as: Eliquis   Take 2 Tablets by mouth 2 times a day for 5 days.  Dose: 10 mg     * apixaban 5mg Tabs  Start taking on: January 15, 2025  Commonly known as: Eliquis   Take 1 Tablet by mouth 2 times a day for 60 days.  Dose: 5 mg     predniSONE 1 MG Tabs  Start taking on: January 10, 2025  Commonly known as: Deltasone   30mg daily           * This list has 2 medication(s) that are the same as other medications prescribed for you. Read the directions carefully, and ask your doctor or other care provider to review them with you.                CHANGE how you take these medications        Instructions   gabapentin 100 MG Caps  What changed:   how much to take  when to take this  additional instructions  Commonly known as: Neurontin   Take 2 Capsules by mouth 3 times a day.  Dose: 200 mg            CONTINUE taking these medications        Instructions   carvedilol 6.25 MG Tabs  Commonly known as: Coreg   Take 6.25 mg by mouth 2 times a day with meals.  Dose: 6.25 mg     Entresto 24-26 MG  Tabs  Generic drug: sacubitril-valsartan   Doctor's comments: Please call to schedule follow up appointment for further refills. Please call [phone]. Thank you.  Take 1 tablet by mouth twice daily  Dose: 1 Tablet     furosemide 20 MG Tabs  Commonly known as: Lasix   Take 1 Tablet by mouth every day.  Dose: 20 mg     indomethacin 50 MG Caps  Commonly known as: Indocin   Take 50 mg by mouth every 8 hours as needed (gout).  Dose: 50 mg     OZEMPIC (0.25 OR 0.5 MG/DOSE) SC   Inject 0.25 mg under the skin every 7 days.  Dose: 0.25 mg              Allergies  No Known Allergies    DIET  No orders of the defined types were placed in this encounter.      LABORATORY  Lab Results   Component Value Date    SODIUM 139 01/08/2025    POTASSIUM 4.3 01/08/2025    CHLORIDE 104 01/08/2025    CO2 25 01/08/2025    GLUCOSE 92 01/08/2025    BUN 13 01/08/2025    CREATININE 1.18 01/08/2025        Lab Results   Component Value Date    WBC 5.8 01/08/2025    HEMOGLOBIN 11.4 (L) 01/08/2025    HEMATOCRIT 34.9 (L) 01/08/2025    PLATELETCT 186 01/08/2025        Total time of the discharge process exceeds 34 minutes.

## 2025-01-09 NOTE — PROGRESS NOTES
Received bedside report from RN Brandy, pt care assumed. VS WDL, pt assessment complete. Pt A&Ox4, c/o 9/10 pain at this time. POC discussed with pt and verbalizes no questions. Pt denies any additional needs at this time. Bed locked and in lowest position, bed alarm on. Pt educated on fall risk and verbalized understanding, call light within reach, hourly rounding initiated.

## 2025-01-09 NOTE — CARE PLAN
"The patient is Stable - Low risk of patient condition declining or worsening    Shift Goals  Clinical Goals: Patient will verbalize understanding of POC. Patient's pain will remain well controlled throughout shift. Patient's level of anxiety will decrease.  Patient Goals: \"Be able to move around more.\"  Family Goals: JOSE    Progress made toward(s) clinical / shift goals:  Patient provided a verbal discussion related to POC to include discharge plans. Provided education on Elliquis and plans to D/C to [facility] pending acceptance and bed. Patient verbalized understanding. Patient re-educated on current diagnoses and a review of the treatment plan that occurred over the past 72 hours. Provided a discussion on the pathology and etiology of PE and DVT and led a discussion on why these can be life threatening. Client educated on the need to be EOB / up to chair during the day and to be in bed only for rest and sleep. Patient is able to verbalize pain on a scale of 1-10 and is able to verbalize comfort goal. Pain management plan followed and pt educated on nonpharmacologic and pharmacological comfort measures. Pt remains free from falls at this time. Safety precautions in place. Pt educated on calling for assistance when needed.      Patient is not progressing towards the following goals: Patient at risk for poor outcome due to impaired knowledge AEB patient's statements \"I'm still just unsure what's going on and what I have.\" Provided re-enforcement of education provided to client related to PE/DVT diagnosis via verbal discussion and printed handout given 48 hours ago. Patient verbalized understanding. Patient remains at risk of impaired activity tolerance due to spending the majority of hours in bed and not up to chair/EOB due to chronic Gout pain, Encouraged patient to attempt ambulation as tolerated and being up to chair during the day. Patient observed to be considerably more anxious today compared to the last " 24-36 hours. Patient educated to utilize call light. Patient and family oriented to hospital room. Patient encouraged to ask questions about plan of care. Patient effectively uses call light and is involved in POC.       Problem: Knowledge Deficit - Standard  Goal: Patient and family/care givers will demonstrate understanding of plan of care, disease process/condition, diagnostic tests and medications  Outcome: Not Progressing     Problem: Pain - Standard  Goal: Alleviation of pain or a reduction in pain to the patient’s comfort goal  Outcome: Not Progressing     Problem: Psychosocial  Goal: Patient's level of anxiety will decrease  Outcome: Not Progressing  Goal: Patient's ability to verbalize feelings about condition will improve  Outcome: Not Progressing  Goal: Patient's ability to re-evaluate and adapt role responsibilities will improve  Outcome: Not Progressing  Goal: Patient and family will demonstrate ability to cope with life altering diagnosis and/or procedure  Outcome: Not Progressing     Problem: Discharge Barriers/Planning  Goal: Patient's continuum of care needs are met  Outcome: Not Progressing     Problem: Mobility  Goal: Patient's capacity to carry out activities will improve  Outcome: Not Progressing     Problem: Self Care  Goal: Patient will have the ability to perform ADLs independently or with assistance (bathe, groom, dress, toilet and feed)  Outcome: Not Progressing     Problem: Fall Risk  Goal: Patient will remain free from falls  Outcome: Not Progressing

## 2025-01-09 NOTE — PROGRESS NOTES
Monitor Summary:     Rhythm: SB/SR  Rate: 59-74  Ectopy: (R)PVC  Measurements: .17/.08/.38                     12 Hour Chart Check

## 2025-01-26 ENCOUNTER — APPOINTMENT (OUTPATIENT)
Dept: RADIOLOGY | Facility: MEDICAL CENTER | Age: 45
DRG: 073 | End: 2025-01-26
Payer: COMMERCIAL

## 2025-01-26 ENCOUNTER — HOSPITAL ENCOUNTER (INPATIENT)
Facility: MEDICAL CENTER | Age: 45
End: 2025-01-26
Payer: COMMERCIAL

## 2025-01-26 DIAGNOSIS — K59.00 CONSTIPATION, UNSPECIFIED CONSTIPATION TYPE: ICD-10-CM

## 2025-01-26 DIAGNOSIS — R73.9 HYPERGLYCEMIA: ICD-10-CM

## 2025-01-26 DIAGNOSIS — G32.0 SUBACUTE COMBINED DEGENERATION OF SPINAL CORD (HCC): ICD-10-CM

## 2025-01-26 DIAGNOSIS — M10.9 ACUTE GOUT OF LEFT ANKLE, UNSPECIFIED CAUSE: ICD-10-CM

## 2025-01-26 DIAGNOSIS — G62.9 PERIPHERAL POLYNEUROPATHY: ICD-10-CM

## 2025-01-26 DIAGNOSIS — G62.9 NEUROPATHY: ICD-10-CM

## 2025-01-26 DIAGNOSIS — R29.898 BILATERAL LEG WEAKNESS: ICD-10-CM

## 2025-01-26 DIAGNOSIS — E53.8 SUBACUTE COMBINED DEGENERATION OF SPINAL CORD (HCC): ICD-10-CM

## 2025-01-26 DIAGNOSIS — E53.8 B12 DEFICIENCY: ICD-10-CM

## 2025-01-26 LAB
ALBUMIN SERPL BCP-MCNC: 4 G/DL (ref 3.2–4.9)
ALBUMIN/GLOB SERPL: 1.2 G/DL
ALP SERPL-CCNC: 72 U/L (ref 30–99)
ALT SERPL-CCNC: 18 U/L (ref 2–50)
ANION GAP SERPL CALC-SCNC: 12 MMOL/L (ref 7–16)
AST SERPL-CCNC: 17 U/L (ref 12–45)
BASOPHILS # BLD AUTO: 0.5 % (ref 0–1.8)
BASOPHILS # BLD: 0.03 K/UL (ref 0–0.12)
BILIRUB SERPL-MCNC: 0.7 MG/DL (ref 0.1–1.5)
BUN SERPL-MCNC: 19 MG/DL (ref 8–22)
CALCIUM ALBUM COR SERPL-MCNC: 9.5 MG/DL (ref 8.5–10.5)
CALCIUM SERPL-MCNC: 9.5 MG/DL (ref 8.5–10.5)
CHLORIDE SERPL-SCNC: 101 MMOL/L (ref 96–112)
CO2 SERPL-SCNC: 22 MMOL/L (ref 20–33)
CREAT SERPL-MCNC: 1.18 MG/DL (ref 0.5–1.4)
EOSINOPHIL # BLD AUTO: 0.2 K/UL (ref 0–0.51)
EOSINOPHIL NFR BLD: 3.1 % (ref 0–6.9)
ERYTHROCYTE [DISTWIDTH] IN BLOOD BY AUTOMATED COUNT: 44.1 FL (ref 35.9–50)
FOLATE SERPL-MCNC: 23.3 NG/ML
GFR SERPLBLD CREATININE-BSD FMLA CKD-EPI: 77 ML/MIN/1.73 M 2
GLOBULIN SER CALC-MCNC: 3.3 G/DL (ref 1.9–3.5)
GLUCOSE SERPL-MCNC: 105 MG/DL (ref 65–99)
HAV IGM SERPL QL IA: NORMAL
HBV CORE IGM SER QL: NORMAL
HBV SURFACE AG SER QL: NORMAL
HCT VFR BLD AUTO: 35.4 % (ref 42–52)
HCV AB SER QL: NORMAL
HCYS SERPL-SCNC: 162.08 UMOL/L
HGB BLD-MCNC: 11.9 G/DL (ref 14–18)
HIV 1+2 AB+HIV1 P24 AG SERPL QL IA: NORMAL
IMM GRANULOCYTES # BLD AUTO: 0.02 K/UL (ref 0–0.11)
IMM GRANULOCYTES NFR BLD AUTO: 0.3 % (ref 0–0.9)
LYMPHOCYTES # BLD AUTO: 1.5 K/UL (ref 1–4.8)
LYMPHOCYTES NFR BLD: 23.4 % (ref 22–41)
MAGNESIUM SERPL-MCNC: 2 MG/DL (ref 1.5–2.5)
MCH RBC QN AUTO: 30.1 PG (ref 27–33)
MCHC RBC AUTO-ENTMCNC: 33.6 G/DL (ref 32.3–36.5)
MCV RBC AUTO: 89.4 FL (ref 81.4–97.8)
MONOCYTES # BLD AUTO: 0.62 K/UL (ref 0–0.85)
MONOCYTES NFR BLD AUTO: 9.7 % (ref 0–13.4)
NEUTROPHILS # BLD AUTO: 4.04 K/UL (ref 1.82–7.42)
NEUTROPHILS NFR BLD: 63 % (ref 44–72)
NRBC # BLD AUTO: 0 K/UL
NRBC BLD-RTO: 0 /100 WBC (ref 0–0.2)
PHOSPHATE SERPL-MCNC: 2.7 MG/DL (ref 2.5–4.5)
PLATELET # BLD AUTO: 256 K/UL (ref 164–446)
PMV BLD AUTO: 9.2 FL (ref 9–12.9)
POTASSIUM SERPL-SCNC: 4.2 MMOL/L (ref 3.6–5.5)
PROT SERPL-MCNC: 7.3 G/DL (ref 6–8.2)
RBC # BLD AUTO: 3.96 M/UL (ref 4.7–6.1)
SODIUM SERPL-SCNC: 135 MMOL/L (ref 135–145)
T PALLIDUM AB SER QL IA: NORMAL
TSH SERPL DL<=0.005 MIU/L-ACNC: 0.98 UIU/ML (ref 0.38–5.33)
VIT B12 SERPL-MCNC: 156 PG/ML (ref 211–911)
WBC # BLD AUTO: 6.4 K/UL (ref 4.8–10.8)

## 2025-01-26 PROCEDURE — 86334 IMMUNOFIX E-PHORESIS SERUM: CPT

## 2025-01-26 PROCEDURE — 86255 FLUORESCENT ANTIBODY SCREEN: CPT

## 2025-01-26 PROCEDURE — 84446 ASSAY OF VITAMIN E: CPT

## 2025-01-26 PROCEDURE — 82746 ASSAY OF FOLIC ACID SERUM: CPT

## 2025-01-26 PROCEDURE — 99222 1ST HOSP IP/OBS MODERATE 55: CPT

## 2025-01-26 PROCEDURE — 700117 HCHG RX CONTRAST REV CODE 255: Mod: JZ

## 2025-01-26 PROCEDURE — 72157 MRI CHEST SPINE W/O & W/DYE: CPT

## 2025-01-26 PROCEDURE — 700102 HCHG RX REV CODE 250 W/ 637 OVERRIDE(OP)

## 2025-01-26 PROCEDURE — 82607 VITAMIN B-12: CPT

## 2025-01-26 PROCEDURE — 84100 ASSAY OF PHOSPHORUS: CPT

## 2025-01-26 PROCEDURE — 36415 COLL VENOUS BLD VENIPUNCTURE: CPT

## 2025-01-26 PROCEDURE — 72158 MRI LUMBAR SPINE W/O & W/DYE: CPT

## 2025-01-26 PROCEDURE — 83921 ORGANIC ACID SINGLE QUANT: CPT

## 2025-01-26 PROCEDURE — 80074 ACUTE HEPATITIS PANEL: CPT

## 2025-01-26 PROCEDURE — 84443 ASSAY THYROID STIM HORMONE: CPT

## 2025-01-26 PROCEDURE — 700105 HCHG RX REV CODE 258

## 2025-01-26 PROCEDURE — 83735 ASSAY OF MAGNESIUM: CPT

## 2025-01-26 PROCEDURE — 83516 IMMUNOASSAY NONANTIBODY: CPT | Mod: 91

## 2025-01-26 PROCEDURE — A9270 NON-COVERED ITEM OR SERVICE: HCPCS

## 2025-01-26 PROCEDURE — 99254 IP/OBS CNSLTJ NEW/EST MOD 60: CPT

## 2025-01-26 PROCEDURE — 82525 ASSAY OF COPPER: CPT

## 2025-01-26 PROCEDURE — 83090 ASSAY OF HOMOCYSTEINE: CPT

## 2025-01-26 PROCEDURE — 86780 TREPONEMA PALLIDUM: CPT

## 2025-01-26 PROCEDURE — 770006 HCHG ROOM/CARE - MED/SURG/GYN SEMI*

## 2025-01-26 PROCEDURE — 85025 COMPLETE CBC W/AUTO DIFF WBC: CPT

## 2025-01-26 PROCEDURE — 80053 COMPREHEN METABOLIC PANEL: CPT

## 2025-01-26 PROCEDURE — 700111 HCHG RX REV CODE 636 W/ 250 OVERRIDE (IP)

## 2025-01-26 PROCEDURE — A9579 GAD-BASE MR CONTRAST NOS,1ML: HCPCS | Mod: JZ

## 2025-01-26 PROCEDURE — 87389 HIV-1 AG W/HIV-1&-2 AB AG IA: CPT

## 2025-01-26 RX ORDER — AMOXICILLIN 250 MG
2 CAPSULE ORAL EVERY EVENING
Status: DISCONTINUED | OUTPATIENT
Start: 2025-01-26 | End: 2025-01-29

## 2025-01-26 RX ORDER — LORAZEPAM 2 MG/ML
1 INJECTION INTRAMUSCULAR
Status: DISCONTINUED | OUTPATIENT
Start: 2025-01-26 | End: 2025-01-30

## 2025-01-26 RX ORDER — OXYCODONE HYDROCHLORIDE 10 MG/1
10 TABLET ORAL
Status: DISCONTINUED | OUTPATIENT
Start: 2025-01-26 | End: 2025-02-02 | Stop reason: HOSPADM

## 2025-01-26 RX ORDER — GABAPENTIN 100 MG/1
200 CAPSULE ORAL 3 TIMES DAILY
Status: DISCONTINUED | OUTPATIENT
Start: 2025-01-26 | End: 2025-01-26

## 2025-01-26 RX ORDER — SACUBITRIL AND VALSARTAN 24; 26 MG/1; MG/1
1 TABLET, FILM COATED ORAL 2 TIMES DAILY
Status: DISCONTINUED | OUTPATIENT
Start: 2025-01-26 | End: 2025-02-02 | Stop reason: HOSPADM

## 2025-01-26 RX ORDER — GABAPENTIN 300 MG/1
600 CAPSULE ORAL 3 TIMES DAILY
Status: DISCONTINUED | OUTPATIENT
Start: 2025-01-26 | End: 2025-02-02 | Stop reason: HOSPADM

## 2025-01-26 RX ORDER — ACETAMINOPHEN 325 MG/1
650 TABLET ORAL EVERY 6 HOURS PRN
Status: DISCONTINUED | OUTPATIENT
Start: 2025-01-26 | End: 2025-01-26

## 2025-01-26 RX ORDER — POLYETHYLENE GLYCOL 3350 17 G/17G
1 POWDER, FOR SOLUTION ORAL
Status: DISCONTINUED | OUTPATIENT
Start: 2025-01-26 | End: 2025-01-29

## 2025-01-26 RX ORDER — CYANOCOBALAMIN 1000 UG/ML
1000 INJECTION, SOLUTION INTRAMUSCULAR; SUBCUTANEOUS
Status: DISCONTINUED | OUTPATIENT
Start: 2025-01-29 | End: 2025-02-02 | Stop reason: HOSPADM

## 2025-01-26 RX ORDER — MORPHINE SULFATE 4 MG/ML
4 INJECTION INTRAVENOUS
Status: DISCONTINUED | OUTPATIENT
Start: 2025-01-26 | End: 2025-02-02 | Stop reason: HOSPADM

## 2025-01-26 RX ORDER — CYANOCOBALAMIN 1000 UG/ML
1000 INJECTION, SOLUTION INTRAMUSCULAR; SUBCUTANEOUS DAILY
Status: COMPLETED | OUTPATIENT
Start: 2025-01-26 | End: 2025-01-28

## 2025-01-26 RX ORDER — ONDANSETRON 2 MG/ML
4 INJECTION INTRAMUSCULAR; INTRAVENOUS EVERY 4 HOURS PRN
Status: ACTIVE | OUTPATIENT
Start: 2025-01-26 | End: 2025-01-26

## 2025-01-26 RX ORDER — CARVEDILOL 6.25 MG/1
6.25 TABLET ORAL 2 TIMES DAILY WITH MEALS
Status: DISCONTINUED | OUTPATIENT
Start: 2025-01-26 | End: 2025-02-02 | Stop reason: HOSPADM

## 2025-01-26 RX ORDER — INSULIN LISPRO 100 [IU]/ML
1-6 INJECTION, SOLUTION INTRAVENOUS; SUBCUTANEOUS
Status: DISCONTINUED | OUTPATIENT
Start: 2025-01-26 | End: 2025-01-26

## 2025-01-26 RX ORDER — DEXTROSE MONOHYDRATE 25 G/50ML
25 INJECTION, SOLUTION INTRAVENOUS
Status: DISCONTINUED | OUTPATIENT
Start: 2025-01-26 | End: 2025-01-26

## 2025-01-26 RX ORDER — ACETAMINOPHEN 325 MG/1
650 TABLET ORAL EVERY 6 HOURS PRN
Status: DISCONTINUED | OUTPATIENT
Start: 2025-01-26 | End: 2025-02-02 | Stop reason: HOSPADM

## 2025-01-26 RX ORDER — FUROSEMIDE 20 MG/1
20 TABLET ORAL DAILY
Status: DISCONTINUED | OUTPATIENT
Start: 2025-01-26 | End: 2025-02-02 | Stop reason: HOSPADM

## 2025-01-26 RX ORDER — CYANOCOBALAMIN 1000 UG/ML
1000 INJECTION, SOLUTION INTRAMUSCULAR; SUBCUTANEOUS
Status: DISCONTINUED | OUTPATIENT
Start: 2025-02-26 | End: 2025-02-02 | Stop reason: HOSPADM

## 2025-01-26 RX ORDER — OXYCODONE HYDROCHLORIDE 5 MG/1
5 TABLET ORAL
Status: DISCONTINUED | OUTPATIENT
Start: 2025-01-26 | End: 2025-02-02 | Stop reason: HOSPADM

## 2025-01-26 RX ADMIN — SACUBITRIL AND VALSARTAN 1 TABLET: 24; 26 TABLET, FILM COATED ORAL at 09:20

## 2025-01-26 RX ADMIN — OXYCODONE HYDROCHLORIDE 10 MG: 10 TABLET ORAL at 22:47

## 2025-01-26 RX ADMIN — GADOTERIDOL 20 ML: 279.3 INJECTION, SOLUTION INTRAVENOUS at 08:31

## 2025-01-26 RX ADMIN — CARVEDILOL 6.25 MG: 6.25 TABLET, FILM COATED ORAL at 17:51

## 2025-01-26 RX ADMIN — CARVEDILOL 6.25 MG: 6.25 TABLET, FILM COATED ORAL at 09:22

## 2025-01-26 RX ADMIN — SACUBITRIL AND VALSARTAN 1 TABLET: 24; 26 TABLET, FILM COATED ORAL at 17:50

## 2025-01-26 RX ADMIN — SENNOSIDES AND DOCUSATE SODIUM 2 TABLET: 50; 8.6 TABLET ORAL at 17:50

## 2025-01-26 RX ADMIN — GABAPENTIN 200 MG: 100 CAPSULE ORAL at 09:20

## 2025-01-26 RX ADMIN — APIXABAN 5 MG: 5 TABLET, FILM COATED ORAL at 09:21

## 2025-01-26 RX ADMIN — GABAPENTIN 600 MG: 300 CAPSULE ORAL at 17:50

## 2025-01-26 RX ADMIN — APIXABAN 5 MG: 5 TABLET, FILM COATED ORAL at 17:50

## 2025-01-26 RX ADMIN — METHYLPREDNISOLONE SODIUM SUCCINATE 1000 MG: 1 INJECTION, POWDER, LYOPHILIZED, FOR SOLUTION INTRAMUSCULAR; INTRAVENOUS at 09:29

## 2025-01-26 RX ADMIN — GABAPENTIN 600 MG: 300 CAPSULE ORAL at 12:04

## 2025-01-26 RX ADMIN — OXYCODONE HYDROCHLORIDE 10 MG: 10 TABLET ORAL at 09:21

## 2025-01-26 RX ADMIN — FUROSEMIDE 20 MG: 20 TABLET ORAL at 09:22

## 2025-01-26 RX ADMIN — CYANOCOBALAMIN 1000 MCG: 1000 INJECTION, SOLUTION INTRAMUSCULAR; SUBCUTANEOUS at 15:38

## 2025-01-26 ASSESSMENT — PAIN DESCRIPTION - PAIN TYPE
TYPE: ACUTE PAIN

## 2025-01-26 ASSESSMENT — ENCOUNTER SYMPTOMS
TINGLING: 1
HEADACHES: 0
SORE THROAT: 0
DIARRHEA: 0
CHILLS: 0
ABDOMINAL PAIN: 0
WHEEZING: 0
SEIZURES: 0
COUGH: 0
FOCAL WEAKNESS: 1
CONSTIPATION: 0
PALPITATIONS: 0
LOSS OF CONSCIOUSNESS: 0
HEARTBURN: 0
DOUBLE VISION: 0
SENSORY CHANGE: 1
VOMITING: 0
MYALGIAS: 1
FEVER: 0
DIZZINESS: 0
FALLS: 1
TREMORS: 0
SHORTNESS OF BREATH: 0
WEAKNESS: 1
NAUSEA: 0

## 2025-01-26 ASSESSMENT — PATIENT HEALTH QUESTIONNAIRE - PHQ9
2. FEELING DOWN, DEPRESSED, IRRITABLE, OR HOPELESS: NOT AT ALL
1. LITTLE INTEREST OR PLEASURE IN DOING THINGS: NOT AT ALL
SUM OF ALL RESPONSES TO PHQ9 QUESTIONS 1 AND 2: 0

## 2025-01-26 ASSESSMENT — COGNITIVE AND FUNCTIONAL STATUS - GENERAL
EATING MEALS: A LITTLE
DAILY ACTIVITIY SCORE: 17
WALKING IN HOSPITAL ROOM: A LOT
DRESSING REGULAR LOWER BODY CLOTHING: A LOT
SUGGESTED CMS G CODE MODIFIER MOBILITY: CL
MOVING FROM LYING ON BACK TO SITTING ON SIDE OF FLAT BED: A LITTLE
TOILETING: A LITTLE
TURNING FROM BACK TO SIDE WHILE IN FLAT BAD: A LITTLE
HELP NEEDED FOR BATHING: A LITTLE
CLIMB 3 TO 5 STEPS WITH RAILING: A LOT
DRESSING REGULAR UPPER BODY CLOTHING: A LITTLE
MOBILITY SCORE: 14
MOVING TO AND FROM BED TO CHAIR: A LOT
SUGGESTED CMS G CODE MODIFIER DAILY ACTIVITY: CK
STANDING UP FROM CHAIR USING ARMS: A LOT
PERSONAL GROOMING: A LITTLE

## 2025-01-26 ASSESSMENT — FIBROSIS 4 INDEX: FIB4 SCORE: 0.87

## 2025-01-26 NOTE — PROGRESS NOTES
[facility] DIRECT ADMISSION REPORT  Transferring facility: [facility]  Transferring physician: Matilda Maldonado    Chief complaint: Ascending lower extremity weakness and numbness  Pertinent history & patient course:   Patient is a 44-year-old male past medical history of chronic congestive heart failure and recent admission due to bilateral pulmonary emboli and DVT discharged on Eliquis who is presenting now for 1 week with lower extremity weakness and numbness that appears to be ascending.  Denies any red flag signs, has constipation, no focal trauma or back pain.  Compliant with all medications.  Pertinent imaging & lab results: Unremarkable  Consultants called prior to transfer and pertinent input from consultants: None  Code Status: Full per transferring provider, I personally verified with the transferring provider patient's code status and the transferring provider has confirmed this with the patient.  Reason for Transfer: Neurologic evaluation  Further work up or recommendations requested prior to transfer: None    Patient accepted for transfer: Yes  Accepting [facility] Facility: [facility] to notify the Triage Coordinator in the RTOC via Voalte or Phone ext. [phone] when patient arrives to the unit. The Triage Coordinator will assign the admitting provider.    Consultants to be called upon arrival: Neurology in morning  Admission status: Inpatient.   Floor requested: Neurology, inpatient    The admitting provider is the point of contact for questions or concerns regarding patient's care.

## 2025-01-26 NOTE — HOSPITAL COURSE
Patient is a 44-year-old male with past medical history of bilateral pulm emboli on Eliquis from last admission 1/9/2025 who presents due to ascending weakness with numbness and tingling in bilateral lower extremities up to his abdomen. Patient states his symptoms started about 1 month ago, however progressively worsened the past 2 weeks making it difficult for the patient to walk. States it started at his feet, then to his knees and to his epigastric area below his rib cage with associated numbness and tingling and sciatic pain that radiates down his bilateral lower extremities, worse on the left. Denies any bowel or bladder incontinence, denies any saddle anesthesia, and does feel some muscle spasms over his torso area. Denies any shortness of breath or trouble breathing. No new medication starts, denies any sore throat, no bloody diarrhea in the past few months. Patient transferred to [facility] for neurologic evaluation and MRI.    no

## 2025-01-26 NOTE — PROGRESS NOTES
4 Eyes Skin Assessment Completed by beth RN and RUFUS corona.    Head WDL  Ears WDL  Nose WDL  Mouth WDL  Neck WDL  Breast/Chest WDL  Shoulder Blades WDL  Spine WDL  (R) Arm/Elbow/Hand WDL  (L) Arm/Elbow/Hand WDL  Abdomen WDL  Groin WDL  Scrotum/Coccyx/Buttocks WDL  (R) Leg Scar  (L) Leg Scar  (R) Heel/Foot/Toe Boggy  (L) Heel/Foot/Toe Boggy          Devices In Places Tele Box, Blood Pressure Cuff, and Pulse Ox      Interventions In Place Pillows    Possible Skin Injury No    Pictures Uploaded Into Epic N/A  Wound Consult Placed N/A  RN Wound Prevention Protocol Ordered No

## 2025-01-26 NOTE — PROGRESS NOTES
0530 Received pt per stretcher ,direct admit from [facility]. Vital signs checked stable. MD at bedside . Admission profile and skin check done .

## 2025-01-26 NOTE — ASSESSMENT & PLAN NOTE
1/31/2025  Present on last admission, discharged 1/9/2025 found to have bilateral pulmonary emboli   Continue Eliquis

## 2025-01-26 NOTE — ASSESSMENT & PLAN NOTE
1/31/2025  For couple months before admission with tingling  MRI showed abnormal sinus with multiple differential diagnosis however neurologist feels the patient has neuropathy due to B12 deficiency and did not recommend any LP or other MRI and no need for steroid  PT and OT and PMR

## 2025-01-26 NOTE — CARE PLAN
The patient is Stable - Low risk of patient condition declining or worsening    Shift Goals  Clinical Goals: Stable neuro status, pain management  Patient Goals: Pain management  Family Goals: JOSE    Progress made toward(s) clinical / shift goals:      Problem: Neuro Status  Goal: Neuro status will remain stable or improve  Outcome: Progressing    Q4H neuro checks in place. Pt's neurological status (A/Ox4) remains unchanged throughout shift. Bed alarm is on, call light within reach.     Problem: Pain - Standard  Goal: Alleviation of pain or a reduction in pain to the patient’s comfort goal  Outcome: Progressing   Frequent pain assessments throughout shift. PRN pain medications provided per MAR and pt request. Pharmacological and non-pharmacological interventions utilized.      Patient is not progressing towards the following goals:

## 2025-01-26 NOTE — CONSULTS
"Neurology Initial Consult H&P  Neurohospitalist Service, [facility]    Referring Physician: ABDI Howard*    No chief complaint on file.      HPI: Oracio Jurado is a 44 y.o. male with presenting for complaint of lower extremity weakness.     Lives in [place], [place]. Used to work as pro , now does beer distribution. History of heart failure on Entresto, bilateral pulmonary embolism and extensive left leg DVT on Eliquis on 1/5/2025, gout, hypertension, recent cocaine use. 1 month ago, he developed severe burning and pins and needles in his feet. Pain limited his walking enough he had to use a walker. About 1 week ago he developed pain in his right elbow and left knee, as well as a \"tight\" feeling in his bilateral thigh, groin, and abdomen at level of umbilicus and below. He feels that his thighs are permanently contracted the last week. It feels like he is being \"squeezed\" or that his \"skin is too tight\". He did have some urinary hesitancy but was diagnosed with a UTI and improved after a round of antibiotics. Has had intermitted constipation the last few weeks. Other than \"tight\" feeling in groin / perineum, denies any saddle anesthesia. No incontinence.       Review of systems: In addition to what is detailed in the HPI above, all other systems reviewed and are negative.    Past Medical History:    has a past medical history of Blood clotting disorder ([facility]), HF (heart failure) ([facility]), and Hypertension.    FHx:  family history is not on file.    SHx:  Sexually active, sometimes unprotected. Uses marijuana. Uses alcohol \"a few shots\" 1-3 times per week. Past history of polysubstance use: meth, cocaine, nitrous oxide, possible other substance, last known use was cocaine 1 month ago. Denies any IV drug use.     Allergies:  No Known Allergies    Medications:    Current Facility-Administered Medications:     acetaminophen (Tylenol) tablet 650 mg, 650 mg, Oral, Q6HRS PRN, " Jorge Luis Jackson D.O.    senna-docusate (Pericolace Or Senokot S) 8.6-50 MG per tablet 2 Tablet, 2 Tablet, Oral, Q EVENING **AND** polyethylene glycol/lytes (Miralax) Packet 1 Packet, 1 Packet, Oral, QDAY PRN, Jorge Luis Jackson D.O.    apixaban (Eliquis) tablet 5 mg, 5 mg, Oral, BID, LUIS MichaelO., 5 mg at 01/26/25 0921    carvedilol (Coreg) tablet 6.25 mg, 6.25 mg, Oral, BID WITH MEALS, Jorge Luis Jackson D.O., 6.25 mg at 01/26/25 0922    sacubitril-valsartan (Entresto) 24-26 MG 1 Tablet, 1 Tablet, Oral, BID, LUIS MichaelO., 1 Tablet at 01/26/25 0920    furosemide (Lasix) tablet 20 mg, 20 mg, Oral, DAILY, LUIS MichaelOSharif, 20 mg at 01/26/25 0922    LORazepam (Ativan) injection 1 mg, 1 mg, Intravenous, Once PRN, CRISTELA HowardP.RTATE    Pharmacy Consult Request ...Pain Management Review 1 Each, 1 Each, Other, PHARMACY TO DOSE, ANDRAE Howard.P.R.MAITE.    oxyCODONE immediate-release (Roxicodone) tablet 5 mg, 5 mg, Oral, Q3HRS PRN **OR** oxyCODONE immediate release (Roxicodone) tablet 10 mg, 10 mg, Oral, Q3HRS PRN, 10 mg at 01/26/25 0921 **OR** morphine 4 MG/ML injection 4 mg, 4 mg, Intravenous, Q3HRS PRN, ANDRAE Howard.P.R.MAITE.    gabapentin (Neurontin) capsule 600 mg, 600 mg, Oral, TID, Vishal Hummel A.P.R.N., 600 mg at 01/26/25 1204    Physical Examination:   General: Patient is awake and in no acute distress  Neck: There is normal range of motion  CV: regular rate   Extremities:  clear, dry, intact, without peripheral edema    NEUROLOGICAL EXAM:     /77   Pulse 76   Temp 36 °C (96.8 °F) (Temporal)   Resp 17   Wt (!) 141 kg (311 lb 1.1 oz)   SpO2 94%   BMI 42.19 kg/m²     Mental status: Awake, alert and fully oriented  Speech and language: Speech is clear and fluent. The patient is able to name and repeat, and follow commands  Cranial nerve exam: PERRL. Fields and versions intact. Face is symmetric. SILT. Tongue is midline.  Motor exam:   Upper  Extremity  Myotome R L   Shoulder flexion C5 5 5   Elbow flexion C5 3 (pain) 5   Wrist extension C6 5 5   Elbow extension C7 3 (pain) 5   Finger flexion C8 5 5   Finger abduction T1 5 5      Lower Extremity Myotome R L   Hip flexion L2 4+ 3 (pain)   Knee extension L3 5 4+ (pain)   Ankle dorsiflexion L4 5 (pain) 5 (pain)   Toe extension L5 5 (pain) 5 (pain)   Ankle plantarflexion S1 5 (pain) 5 (pain)   Strength graded on maximal effort, exam heavily limited by right elbow, left knee, bilateral feet pain. No abnormal movements or fasciculations on exam. Tone normal.   Sensory exam: Reacts to tactile in all 4 extremities, no neglect to double stim. Vibration sense decreased about 50% in bilateral toe, fully intact at ankle and above, intact in hands. Proprioception intact in toes and thumbs. Sharp / dull sensation intact throughout. Endorses severe burning/shocking pain with even light touch of his feet and toes.   Deep tendon reflexes:  2+ throughout in uppers, no Dee's. Reflexes difficult to assess in lowers due to pain/withdrawal on percussion. Unable to assess toes due to pain. No overtly pathological reflexes seen or obvious diminished / overly brisk reflexes.   Coordination: No dysmetria on spontaneous movements.   Gait: Deferred       Objective Data:    Labs:  Lab Results   Component Value Date/Time    PROTHROMBTM 14.2 01/07/2025 03:06 AM    INR 1.10 01/07/2025 03:06 AM      Lab Results   Component Value Date/Time    WBC 6.4 01/26/2025 06:43 AM    RBC 3.96 (L) 01/26/2025 06:43 AM    HEMOGLOBIN 11.9 (L) 01/26/2025 06:43 AM    HEMATOCRIT 35.4 (L) 01/26/2025 06:43 AM    MCV 89.4 01/26/2025 06:43 AM    MCH 30.1 01/26/2025 06:43 AM    MCHC 33.6 01/26/2025 06:43 AM    MPV 9.2 01/26/2025 06:43 AM    NEUTSPOLYS 63.00 01/26/2025 06:43 AM    LYMPHOCYTES 23.40 01/26/2025 06:43 AM    MONOCYTES 9.70 01/26/2025 06:43 AM    EOSINOPHILS 3.10 01/26/2025 06:43 AM    BASOPHILS 0.50 01/26/2025 06:43 AM      Lab Results    Component Value Date/Time    SODIUM 135 01/26/2025 06:43 AM    POTASSIUM 4.2 01/26/2025 06:43 AM    CHLORIDE 101 01/26/2025 06:43 AM    CO2 22 01/26/2025 06:43 AM    GLUCOSE 105 (H) 01/26/2025 06:43 AM    BUN 19 01/26/2025 06:43 AM    CREATININE 1.18 01/26/2025 06:43 AM      Lab Results   Component Value Date/Time    CHOLSTRLTOT 139 01/06/2025 01:45 AM    LDL 84 01/06/2025 01:45 AM    HDL 41 01/06/2025 01:45 AM    TRIGLYCERIDE 70 01/06/2025 01:45 AM       Lab Results   Component Value Date/Time    ALKPHOSPHAT 72 01/26/2025 06:43 AM    ASTSGOT 17 01/26/2025 06:43 AM    ALTSGPT 18 01/26/2025 06:43 AM    TBILIRUBIN 0.7 01/26/2025 06:43 AM       Latest Reference Range & Units 01/26/25 12:04   Homocysteine <11.00 umol/L 162.08 (H)   Vitamin B12 -True Cobalamin 211 - 911 pg/mL 156 (L)   (H): Data is abnormally high  (L): Data is abnormally low     Latest Reference Range & Units 01/26/25 12:04   TSH 0.380 - 5.330 uIU/mL 0.978   Hepatitis A Virus Ab, IgM Non-Reactive  Non-Reactive   Hepatitis B Surface Antigen Non-Reactive  Non-Reactive   Hepatitis B Cors Ab,IgM Non-Reactive  Non-Reactive   Hepatitis C Antibody Non-Reactive  Non-Reactive   HIV Ag/Ab Combo Assay Non Reactive  Non-Reactive   Syphilis, Treponemal Qual Non-Reactive  Non-Reactive       Imaging/Testing:    I interpreted and/or reviewed the patient's neuroimaging    MR-LUMBAR SPINE-WITH & W/O   Final Result      1.  Transitional lumbosacral junction. Vestigial ribs at the T12 level. Partial sacralization of L5.   2.  L3-4 mild facet hypertrophy.   3.  L4-5 mild facet hypertrophy. Mild bilateral foraminal narrowing.   4.  L5-S1 mild right foraminal stenosis.   5.  Prominent epidural fat at the lower lumbar/lumbosacral levels. No central stenosis on the basis of disc disease or spondylotic change.      MR-THORACIC SPINE-WITH & W/O   Final Result      1.  Diffusely abnormal T2 hyperintense central cord signal from at least C6-7 down to T10-T11. Questionable  "corresponding enhancement demonstrated just at the T6-T7 level. Diffuse cord involvement in this fashion would be unlikely to represent multiple    sclerosis. Longitudinal multilevel cord involvement in this fashion may represent infectious or autoimmune myelitis. Viral etiologies such as West Nile virus might have this appearance. Acute cord infarction involves the central gray matter, however, the    pattern of involvement does not show the characteristic \"H\" morphology. Diffuse thoracic cord edema based on venous congestion can be seen associated with dural AV fistula. (Foix-Alajouanine syndrome). Finally, neuromyelitis optica (NMO) can manifest as    multilevel longitudinal cord lesions, however, these are usually more segmented with lesions spanning 3 or 4 levels but uncommonly the entire cord in continuity.          Impression and Recommendations: Oracio Jurado is a 44 y.o. male presenting for reported lower extremity weakness for 1 month, worse in the last week. On interview, primarily complaint is actually pain especially in left knee, right elbow, and in his feet. On exam his strength seems fairly intact on maximal effort but very pain-limited. Lower extremity reflex exam limited by burning/shocking pain but no overt pathologic reflexes uncovered. No abnormal muscle tone or fasciculations. MRI on my review has a non-enhancing T2 hyperintense posterior cord signal change continuous from about C7 to T11. Does have history of prior nitrous oxide and chronic alcohol use. No overt upper motor neuron signs on exam. No evidence for compression / TM on imaging. Low suspicion for ischemia. No abnormal flow voids noted.     Neurologically, highest suspicion is for a peripheral neuropathy +/- subacute combined degeneration, versus a toxic neuropathy. Will send labs to further investigate. Do not have an indication based on exam for LP or imaging of C-spine or brain at this time. Do not think this is GBS, can " discontinue steroid. Left lower leg warm to touch and sore compared to right, likely secondary to known left leg DVT. With elbow soreness and swelling, possible gout/arthritis involvement as well.     Plan:   - D/c Solu-medrol  - Foot pain consistent with neuropathy. Can increase gabapentin to 600mg TID  - Neuropathy w/u: CMP, Mg, Phos, HIV, Hep C, RPR, TSH, Vit B12, Homocystine, MMA, AVERY, Vitamin E, Copper, sensory motor neuropathy panel  - Likely will need EMG/NCS outpatient  - PT as symptoms allow  - Neurology will follow for above.     Addendum:   B12 severely low, homocysteine markedly elevated. Will start B12 repletion.   - B12 IM 1000mcg daily x 3 days, then weekly x 4 weeks, then maintenance once monthly.       TODD Royal  Neurohospitalist Services      The evaluation of the patient, and recommended management, was discussed with Dr. Watts and the care team

## 2025-01-26 NOTE — PROGRESS NOTES
I will be off duty and signed out of voalte at 3pm.  If you have any needs for this patient after 3pm, please reach out to the on call Valentine BROOKS.  Thank you!

## 2025-01-26 NOTE — H&P
Hospital Medicine History & Physical Note    Date of Service  1/26/2025    Primary Care Physician  Pcp Pt States None    Code Status  Full Code    Chief Complaint  No chief complaint on file.      History of Presenting Illness  Patient is a 44-year-old male with past medical history of bilateral pulm emboli on Eliquis from last admission 1/9/2025 who presents due to ascending weakness with numbness and tingling in bilateral lower extremities up to his abdomen.  Patient states his symptoms started about 1 month ago, however progressively worsened the past 2 weeks making it difficult for the patient to walk.  States it started at his feet, then to his knees and to his epigastric area below his rib cage with associated numbness and tingling and sciatic pain that radiates down his bilateral lower extremities, worse on the left.  Denies any bowel or bladder incontinence, denies any saddle anesthesia, and does feel some muscle spasms over his torso area.  Denies any shortness of breath or trouble breathing.  No new medication starts, denies any sore throat, no bloody diarrhea in the past few months.  Patient transferred to [facility] for neurologic evaluation and MRI.    I discussed the plan of care with patient.    Review of Systems  Review of Systems   Constitutional:  Negative for chills and fever.   HENT:  Negative for congestion and sore throat.    Eyes:  Negative for double vision.   Respiratory:  Negative for cough, shortness of breath and wheezing.    Cardiovascular:  Negative for chest pain, palpitations and leg swelling.   Gastrointestinal:  Negative for abdominal pain, constipation, diarrhea, nausea and vomiting.   Genitourinary:  Negative for dysuria.   Musculoskeletal:  Positive for falls, joint pain and myalgias.   Skin:  Negative for rash.   Neurological:  Positive for tingling, sensory change and focal weakness. Negative for dizziness, tremors, seizures, loss of consciousness and headaches.       Past Medical  History   has a past medical history of Blood clotting disorder (HCC), HF (heart failure) (HCC), and Hypertension.    Surgical History   has no past surgical history on file.     Family History  family history is not on file.   Family history reviewed with patient. There is no family history that is pertinent to the chief complaint.     Social History   reports that he has never smoked. He has never been exposed to tobacco smoke. He has never used smokeless tobacco. He reports current alcohol use. He reports that he does not currently use drugs.    Allergies  No Known Allergies    Medications  Prior to Admission Medications   Prescriptions Last Dose Informant Patient Reported? Taking?   ENTRESTO 24-26 MG Tab 1/25/2025 Evening Rx Bottle (For Med Information) No Yes   Sig: Take 1 tablet by mouth twice daily   Semaglutide (OZEMPIC, 0.25 OR 0.5 MG/DOSE, SC)  Patient's Home Pharmacy Yes No   Sig: Inject 0.25 mg under the skin every 7 days.   apixaban (ELIQUIS) 5mg Tab 1/25/2025 Evening  No Yes   Sig: Take 1 Tablet by mouth 2 times a day for 60 days.   carvedilol (COREG) 6.25 MG Tab 1/25/2025 Evening Rx Bottle (For Med Information) Yes Yes   Sig: Take 6.25 mg by mouth 2 times a day with meals.   furosemide (LASIX) 20 MG Tab 1/25/2025 Morning Rx Bottle (For Med Information) No Yes   Sig: Take 1 Tablet by mouth every day.   gabapentin (NEURONTIN) 100 MG Cap 1/25/2025 Evening  No Yes   Sig: Take 2 Capsules by mouth 3 times a day.   indomethacin (INDOCIN) 50 MG Cap  Rx Bottle (For Med Information) Yes No   Sig: Take 50 mg by mouth every 8 hours as needed (gout).      Facility-Administered Medications: None       Physical Exam  Temp:  [36 °C (96.8 °F)] 36 °C (96.8 °F)  Pulse:  [70] 70  Resp:  [18] 18  BP: (125)/(88) 125/88  SpO2:  [94 %] 94 %  Blood Pressure: 125/88   Temperature: 36 °C (96.8 °F)   Pulse: 70   Respiration: 18   Pulse Oximetry: 94 %       Physical Exam  Vitals and nursing note reviewed.   Constitutional:        "General: He is not in acute distress.  HENT:      Head: Normocephalic and atraumatic.      Mouth/Throat:      Mouth: Mucous membranes are moist.   Eyes:      General: No scleral icterus.     Extraocular Movements: Extraocular movements intact.   Cardiovascular:      Rate and Rhythm: Normal rate and regular rhythm.      Heart sounds: No murmur heard.     No gallop.   Pulmonary:      Effort: Pulmonary effort is normal. No respiratory distress.      Breath sounds: No wheezing, rhonchi or rales.   Abdominal:      General: Abdomen is flat. Bowel sounds are normal. There is no distension.      Palpations: Abdomen is soft.      Tenderness: There is no abdominal tenderness.   Musculoskeletal:         General: No swelling or tenderness.      Cervical back: Neck supple.   Skin:     General: Skin is warm.   Neurological:      General: No focal deficit present.      Mental Status: He is alert.      Sensory: Sensory deficit present.      Motor: Weakness present.      Deep Tendon Reflexes: Reflexes normal.      Reflex Scores:       Patellar reflexes are 2+ on the right side and 2+ on the left side.  Psychiatric:         Mood and Affect: Mood normal.         Laboratory:          No results for input(s): \"ALTSGPT\", \"ASTSGOT\", \"ALKPHOSPHAT\", \"TBILIRUBIN\", \"DBILIRUBIN\", \"GAMMAGT\", \"AMYLASE\", \"LIPASE\", \"ALB\", \"PREALBUMIN\", \"GLUCOSE\" in the last 72 hours.      No results for input(s): \"NTPROBNP\" in the last 72 hours.      No results for input(s): \"TROPONINT\" in the last 72 hours.    Imaging:  MR-THORACIC SPINE-WITH & W/O    (Results Pending)   MR-LUMBAR SPINE-WITH & W/O    (Results Pending)       no X-Ray or EKG requiring interpretation    Assessment/Plan:  Justification for Admission Status  I anticipate this patient will require at least two midnights for appropriate medical management, necessitating inpatient admission because bilateral lower extremity weakness with a sending weakness and numbness    Patient will need a Med/Surg bed " on NEUROLOGY service .  The need is secondary to bilateral lower extremity weakness and numbness.    * Bilateral leg weakness- (present on admission)  Assessment & Plan  Ascending weakness is noted to patient's bilateral feet that extended to his epigastric area circumferentially with associated numbness and tingling.  Patient states he experienced the symptoms that started about 1 month ago, progressively worsened as well as significantly worsened about 2 weeks ago.  Patient also reports difficulty with urination and has been struggling with constipation.  On exam, patient had an DTR 2+ in bilateral patellar regions with about 3-4+ strength in bilateral lower extremities and does report some muscle spasms in his lower trunk area  - Concern Guillain-Barré syndrome versus transverse myelitis/spinal cord lesion, subacute AIDP?  - Empiric Solu-Medrol 1 g daily  - Ordered MRI of thoracic and lumbar spine with and without contrast  - Neurochecks every 4 hours  -Does not appear to have bulbar symptoms affected, protecting airway without shortness of breath or trouble breathing although affected to epigastric area near diaphragm will need close monitoring  -Neurology consult in a.m.    Bilateral pulmonary embolism (HCC)- (present on admission)  Assessment & Plan  Present on last admission, discharged 1/9/2025 found to have bilateral pulmonary emboli placed on Eliquis, will continue    CHF (congestive heart failure), NYHA class II, acute on chronic, systolic (HCC)- (present on admission)  Assessment & Plan  Continue home medications        VTE prophylaxis: therapeutic anticoagulation with Eliquis

## 2025-01-26 NOTE — PROGRESS NOTES
Hospital Medicine Daily Progress Note    Date of Service  1/26/2025    Chief Complaint  Oracio Jurado is a 44 y.o. male admitted 1/26/2025 with ascending weakness/numbness and tingling.    Hospital Course  Patient is a 44-year-old male with past medical history of bilateral pulm emboli on Eliquis from last admission 1/9/2025 who presents due to ascending weakness with numbness and tingling in bilateral lower extremities up to his abdomen. Patient states his symptoms started about 1 month ago, however progressively worsened the past 2 weeks making it difficult for the patient to walk. States it started at his feet, then to his knees and to his epigastric area below his rib cage with associated numbness and tingling and sciatic pain that radiates down his bilateral lower extremities, worse on the left. Denies any bowel or bladder incontinence, denies any saddle anesthesia, and does feel some muscle spasms over his torso area. Denies any shortness of breath or trouble breathing. No new medication starts, denies any sore throat, no bloody diarrhea in the past few months. Patient transferred to [facility] for neurologic evaluation and MRI.     Interval Problem Update  1/26 afebrile, vitals are stable and on room air.  MRI thoracic and lumbar spine with and without contrast has been ordered and is pending. Neuro consulted- Dr Watts.    I have discussed this patient's plan of care and discharge plan at IDT rounds today with Case Management, Nursing, Nursing leadership, and other members of the IDT team.    Consultants/Specialty  neurology    Code Status  Full Code    Disposition  The patient is not medically cleared for discharge to home or a post-acute facility.  Anticipate discharge to: home with close outpatient follow-up    I have placed the appropriate orders for post-discharge needs.    Review of Systems  Review of Systems   Constitutional:  Negative for chills, fever and malaise/fatigue.   Respiratory:  Negative  for cough and shortness of breath.    Cardiovascular:  Negative for chest pain, palpitations and leg swelling.   Gastrointestinal:  Negative for abdominal pain, diarrhea, heartburn, nausea and vomiting.   Genitourinary:  Negative for dysuria, frequency and urgency.   Neurological:  Positive for tingling, focal weakness and weakness. Negative for dizziness and headaches.   All other systems reviewed and are negative.       Physical Exam  Temp:  [36 °C (96.8 °F)] 36 °C (96.8 °F)  Pulse:  [70] 70  Resp:  [18] 18  BP: (125)/(88) 125/88  SpO2:  [94 %] 94 %    Physical Exam  Vitals and nursing note reviewed.   Constitutional:       Appearance: Normal appearance. He is not ill-appearing.   HENT:      Head: Normocephalic and atraumatic.      Jaw: There is normal jaw occlusion.      Right Ear: Hearing normal.      Left Ear: Hearing normal.      Nose: Nose normal.      Mouth/Throat:      Lips: Pink.      Mouth: Mucous membranes are moist.   Eyes:      Extraocular Movements: Extraocular movements intact.      Conjunctiva/sclera: Conjunctivae normal.      Pupils: Pupils are equal, round, and reactive to light.   Neck:      Vascular: No carotid bruit.   Cardiovascular:      Rate and Rhythm: Normal rate and regular rhythm.      Pulses: Normal pulses.      Heart sounds: Normal heart sounds, S1 normal and S2 normal.   Pulmonary:      Effort: Pulmonary effort is normal.      Breath sounds: Normal breath sounds and air entry. No stridor.   Musculoskeletal:      Cervical back: Normal range of motion and neck supple.      Right lower leg: No edema.      Left lower leg: No edema.   Skin:     General: Skin is warm and dry.      Capillary Refill: Capillary refill takes less than 2 seconds.   Neurological:      General: No focal deficit present.      Mental Status: He is alert and oriented to person, place, and time. Mental status is at baseline.      Sensory: Sensation is intact.      Motor: Motor function is intact.   Psychiatric:          Attention and Perception: Attention and perception normal.         Mood and Affect: Mood and affect normal.         Speech: Speech normal.         Behavior: Behavior normal. Behavior is cooperative.         Fluids  No intake or output data in the 24 hours ending 01/26/25 0842     Laboratory  Recent Labs     01/26/25  0643   WBC 6.4   RBC 3.96*   HEMOGLOBIN 11.9*   HEMATOCRIT 35.4*   MCV 89.4   MCH 30.1   MCHC 33.6   RDW 44.1   PLATELETCT 256   MPV 9.2     Recent Labs     01/26/25  0643   SODIUM 135   POTASSIUM 4.2   CHLORIDE 101   CO2 22   GLUCOSE 105*   BUN 19   CREATININE 1.18   CALCIUM 9.5                   Imaging  MR-THORACIC SPINE-WITH & W/O    (Results Pending)   MR-LUMBAR SPINE-WITH & W/O    (Results Pending)        Assessment/Plan  * Bilateral leg weakness- (present on admission)  Assessment & Plan  Ascending weakness is noted to patient's bilateral feet that extended to his epigastric area circumferentially with associated numbness and tingling.  Patient states he experienced the symptoms that started about 1 month ago, progressively worsened as well as significantly worsened about 2 weeks ago.  Patient also reports difficulty with urination and has been struggling with constipation.  On exam, patient had an DTR 2+ in bilateral patellar regions with about 3-4+ strength in bilateral lower extremities and does report some muscle spasms in his lower trunk area  - Concern Guillain-Barré syndrome versus transverse myelitis/spinal cord lesion, subacute AIDP?  - Empiric Solu-Medrol 1 g daily  - Ordered MRI of thoracic and lumbar spine with and without contrast  - Neurochecks every 4 hours  -Does not appear to have bulbar symptoms affected, protecting airway without shortness of breath or trouble breathing although affected to epigastric area near diaphragm will need close monitoring  -Neurology consulted- Dr Watts    Bilateral pulmonary embolism (HCC)- (present on admission)  Assessment & Plan  Present on  last admission, discharged 1/9/2025 found to have bilateral pulmonary emboli placed on Eliquis, will continue    CHF (congestive heart failure), NYHA class II, acute on chronic, systolic (HCC)- (present on admission)  Assessment & Plan  Continue home medications         VTE prophylaxis:   SCDs/TEDs   therapeutic anticoagulation with eliquis 5 mg BID      I have performed a physical exam and reviewed and updated ROS and Plan today (1/26/2025). In review of yesterday's note (1/25/2025), there are no changes except as documented above.

## 2025-01-27 PROBLEM — E53.8 B12 DEFICIENCY: Status: ACTIVE | Noted: 2025-01-27

## 2025-01-27 PROBLEM — I50.23 CHF (CONGESTIVE HEART FAILURE), NYHA CLASS II, ACUTE ON CHRONIC, SYSTOLIC (HCC): Status: RESOLVED | Noted: 2022-08-15 | Resolved: 2025-01-27

## 2025-01-27 PROBLEM — G62.9 NEUROPATHY: Status: ACTIVE | Noted: 2025-01-27

## 2025-01-27 LAB
ALBUMIN SERPL BCP-MCNC: 4.1 G/DL (ref 3.2–4.9)
ALBUMIN/GLOB SERPL: 1.2 G/DL
ALP SERPL-CCNC: 75 U/L (ref 30–99)
ALT SERPL-CCNC: 19 U/L (ref 2–50)
AMPHET UR QL SCN: NEGATIVE
ANION GAP SERPL CALC-SCNC: 11 MMOL/L (ref 7–16)
AST SERPL-CCNC: 16 U/L (ref 12–45)
BARBITURATES UR QL SCN: NEGATIVE
BENZODIAZ UR QL SCN: NEGATIVE
BILIRUB SERPL-MCNC: 0.5 MG/DL (ref 0.1–1.5)
BUN SERPL-MCNC: 24 MG/DL (ref 8–22)
BZE UR QL SCN: POSITIVE
CALCIUM ALBUM COR SERPL-MCNC: 9.7 MG/DL (ref 8.5–10.5)
CALCIUM SERPL-MCNC: 9.8 MG/DL (ref 8.5–10.5)
CANNABINOIDS UR QL SCN: POSITIVE
CHLORIDE SERPL-SCNC: 102 MMOL/L (ref 96–112)
CO2 SERPL-SCNC: 22 MMOL/L (ref 20–33)
CREAT SERPL-MCNC: 1.38 MG/DL (ref 0.5–1.4)
ERYTHROCYTE [DISTWIDTH] IN BLOOD BY AUTOMATED COUNT: 42.4 FL (ref 35.9–50)
FENTANYL UR QL: POSITIVE
GFR SERPLBLD CREATININE-BSD FMLA CKD-EPI: 64 ML/MIN/1.73 M 2
GLOBULIN SER CALC-MCNC: 3.4 G/DL (ref 1.9–3.5)
GLUCOSE SERPL-MCNC: 187 MG/DL (ref 65–99)
HCT VFR BLD AUTO: 37.1 % (ref 42–52)
HGB BLD-MCNC: 12.3 G/DL (ref 14–18)
MCH RBC QN AUTO: 29.6 PG (ref 27–33)
MCHC RBC AUTO-ENTMCNC: 33.2 G/DL (ref 32.3–36.5)
MCV RBC AUTO: 89.4 FL (ref 81.4–97.8)
METHADONE UR QL SCN: NEGATIVE
OPIATES UR QL SCN: POSITIVE
OXYCODONE UR QL SCN: POSITIVE
PCP UR QL SCN: NEGATIVE
PLATELET # BLD AUTO: 255 K/UL (ref 164–446)
PMV BLD AUTO: 9.3 FL (ref 9–12.9)
POTASSIUM SERPL-SCNC: 4.8 MMOL/L (ref 3.6–5.5)
PROPOXYPH UR QL SCN: NEGATIVE
PROT SERPL-MCNC: 7.5 G/DL (ref 6–8.2)
RBC # BLD AUTO: 4.15 M/UL (ref 4.7–6.1)
SODIUM SERPL-SCNC: 135 MMOL/L (ref 135–145)
WBC # BLD AUTO: 10.3 K/UL (ref 4.8–10.8)

## 2025-01-27 PROCEDURE — 97162 PT EVAL MOD COMPLEX 30 MIN: CPT

## 2025-01-27 PROCEDURE — 700111 HCHG RX REV CODE 636 W/ 250 OVERRIDE (IP)

## 2025-01-27 PROCEDURE — 85027 COMPLETE CBC AUTOMATED: CPT

## 2025-01-27 PROCEDURE — 80053 COMPREHEN METABOLIC PANEL: CPT

## 2025-01-27 PROCEDURE — 99233 SBSQ HOSP IP/OBS HIGH 50: CPT | Performed by: INTERNAL MEDICINE

## 2025-01-27 PROCEDURE — 86052 AQUAPORIN-4 ANTB CBA EACH: CPT

## 2025-01-27 PROCEDURE — 97530 THERAPEUTIC ACTIVITIES: CPT

## 2025-01-27 PROCEDURE — 97166 OT EVAL MOD COMPLEX 45 MIN: CPT

## 2025-01-27 PROCEDURE — 36415 COLL VENOUS BLD VENIPUNCTURE: CPT

## 2025-01-27 PROCEDURE — 770006 HCHG ROOM/CARE - MED/SURG/GYN SEMI*

## 2025-01-27 PROCEDURE — 99232 SBSQ HOSP IP/OBS MODERATE 35: CPT

## 2025-01-27 PROCEDURE — 700102 HCHG RX REV CODE 250 W/ 637 OVERRIDE(OP)

## 2025-01-27 PROCEDURE — A9270 NON-COVERED ITEM OR SERVICE: HCPCS

## 2025-01-27 PROCEDURE — 97535 SELF CARE MNGMENT TRAINING: CPT

## 2025-01-27 PROCEDURE — 80307 DRUG TEST PRSMV CHEM ANLYZR: CPT

## 2025-01-27 RX ADMIN — CYANOCOBALAMIN 1000 MCG: 1000 INJECTION, SOLUTION INTRAMUSCULAR; SUBCUTANEOUS at 05:28

## 2025-01-27 RX ADMIN — CARVEDILOL 6.25 MG: 6.25 TABLET, FILM COATED ORAL at 18:01

## 2025-01-27 RX ADMIN — APIXABAN 5 MG: 5 TABLET, FILM COATED ORAL at 05:28

## 2025-01-27 RX ADMIN — SACUBITRIL AND VALSARTAN 1 TABLET: 24; 26 TABLET, FILM COATED ORAL at 05:29

## 2025-01-27 RX ADMIN — GABAPENTIN 600 MG: 300 CAPSULE ORAL at 12:50

## 2025-01-27 RX ADMIN — APIXABAN 5 MG: 5 TABLET, FILM COATED ORAL at 18:01

## 2025-01-27 RX ADMIN — FUROSEMIDE 20 MG: 20 TABLET ORAL at 05:28

## 2025-01-27 RX ADMIN — POLYETHYLENE GLYCOL 3350 1 PACKET: 17 POWDER, FOR SOLUTION ORAL at 12:50

## 2025-01-27 RX ADMIN — OXYCODONE HYDROCHLORIDE 10 MG: 10 TABLET ORAL at 23:07

## 2025-01-27 RX ADMIN — GABAPENTIN 600 MG: 300 CAPSULE ORAL at 05:28

## 2025-01-27 RX ADMIN — SENNOSIDES AND DOCUSATE SODIUM 2 TABLET: 50; 8.6 TABLET ORAL at 18:01

## 2025-01-27 RX ADMIN — CARVEDILOL 6.25 MG: 6.25 TABLET, FILM COATED ORAL at 07:49

## 2025-01-27 RX ADMIN — GABAPENTIN 600 MG: 300 CAPSULE ORAL at 18:01

## 2025-01-27 RX ADMIN — SACUBITRIL AND VALSARTAN 1 TABLET: 24; 26 TABLET, FILM COATED ORAL at 18:45

## 2025-01-27 RX ADMIN — ACETAMINOPHEN 650 MG: 325 TABLET ORAL at 23:08

## 2025-01-27 ASSESSMENT — GAIT ASSESSMENTS: GAIT LEVEL OF ASSIST: UNABLE TO PARTICIPATE

## 2025-01-27 ASSESSMENT — COGNITIVE AND FUNCTIONAL STATUS - GENERAL
TURNING FROM BACK TO SIDE WHILE IN FLAT BAD: A LITTLE
MOBILITY SCORE: 14
SUGGESTED CMS G CODE MODIFIER MOBILITY: CL
CLIMB 3 TO 5 STEPS WITH RAILING: TOTAL
MOVING FROM LYING ON BACK TO SITTING ON SIDE OF FLAT BED: A LITTLE
WALKING IN HOSPITAL ROOM: TOTAL
DAILY ACTIVITIY SCORE: 19
HELP NEEDED FOR BATHING: A LOT
TOILETING: A LITTLE
STANDING UP FROM CHAIR USING ARMS: A LITTLE
MOVING TO AND FROM BED TO CHAIR: A LITTLE
DRESSING REGULAR LOWER BODY CLOTHING: A LOT
SUGGESTED CMS G CODE MODIFIER DAILY ACTIVITY: CK

## 2025-01-27 ASSESSMENT — ENCOUNTER SYMPTOMS
NAUSEA: 0
DIZZINESS: 0
COUGH: 0
FOCAL WEAKNESS: 1
ABDOMINAL PAIN: 0
WEAKNESS: 1
DIARRHEA: 0
VOMITING: 0
PALPITATIONS: 0
FEVER: 0
HEADACHES: 0
CHILLS: 0
TINGLING: 1
SHORTNESS OF BREATH: 0
HEARTBURN: 0

## 2025-01-27 ASSESSMENT — PAIN DESCRIPTION - PAIN TYPE
TYPE: ACUTE PAIN

## 2025-01-27 ASSESSMENT — ACTIVITIES OF DAILY LIVING (ADL): TOILETING: INDEPENDENT

## 2025-01-27 NOTE — THERAPY
"Occupational Therapy   Initial Evaluation     Patient Name: Oracio Jurado  Age:  44 y.o., Sex:  male  Medical Record #: [id number]  Today's Date: 1/27/2025     Precautions  Precautions: (P) Fall Risk    Assessment  Patient is 44 y.o. male with history of polysubstance use, heart failure who presents with 1 month lower extremity weakness, pins-and-needles pain in feet, and subjective tightness in thighs and abdomen found on MRI to have extensive non-enhancing T2 lesion in posterior column of thoracic spine C6-T11 and severe B12 deficiency on labs.   Pt seen for OT eval. Pt currently limited d/t impaired strength, balance, reports of numbness/tingling mostly in BLE however in BUE additionally, intact to light touch. Pt participated in commode transfer requiring Nely for stand/pivot unable to initiate weight shifting and steps during eval. Pt reports decline since evelio, has many friends who can assist as needed and have been. Lives with two children under the age of 18. Pt will continue to benefit from inpt OT, recommend post acute placement.     Plan    Occupational Therapy Initial Treatment Plan   Treatment Interventions: (P) Self Care / Activities of Daily Living, Adaptive Equipment, Neuro Re-Education / Balance, Therapeutic Exercises, Therapeutic Activity  Treatment Frequency: (P) 4 Times per Week  Duration: (P) Until Therapy Goals Met    DC Equipment Recommendations: (P) Unable to determine at this time  Discharge Recommendations: (P) Recommend post-acute placement for additional occupational therapy services prior to discharge home     Subjective    \"This has been going on for awhile now\"      Objective       01/27/25 0939   Prior Living Situation   Prior Services Home-Independent;Intermittent Physical Support for ADL Per Family  (assist from friends)   Housing / Facility 1 Story House   Steps In Home 0   Bathroom Set up Bathtub / Shower Combination  (friend has a shower chair he can use)   Equipment Owned " 4-Wheel Walker   Lives with - Patient's Self Care Capacity Child Less than 18 Years of Age   Comments Pt lives with children aged 10 and 13. Pt reports he has multiple friends who are always over and a friend who comes in almost daily to help with all IADLs, occasional ADLs as needed.   Prior Level of ADL Function   Self Feeding Independent   Grooming / Hygiene Independent   Bathing Requires Assist   Dressing Requires Assist   Toileting Independent   Comments reports since decline pt has been sponge bathing only, receives assist from friend for LB dressing as needed   Prior Level of IADL Function   Medication Management Independent   Laundry Requires Assist   Kitchen Mobility Independent   Finances Requires Assist   Home Management Requires Assist   Shopping Dependent   Prior Level Of Mobility Supervision With Device in Home   Driving / Transportation Relatives / Others Provide Transportation   History of Falls   History of Falls No   Date of Last Fall   (denies falls, reports near falls)   Precautions   Precautions Fall Risk   Vitals   Pulse Oximetry 93 %   O2 Delivery Device None - Room Air   Vitals Comments VSS   Pain   Intervention Declines   Pain 0 - 10 Group   Location Foot   Location Orientation Right;Left   Pain Rating Scale (NPRS) 3   Therapist Pain Assessment During Activity;Post Activity Pain Same as Prior to Activity;Nurse Notified;3   Cognition    Cognition / Consciousness WDL   Level of Consciousness Alert   Comments pt pleasant and cooperative, receptive to educ and providing all PLOF. pt at times hyperverbal and tangential requiring redirection   Passive ROM Upper Body   Passive ROM Upper Body WDL   Active ROM Upper Body   Active ROM Upper Body  WDL   Dominant Hand Right   Strength Upper Body   Upper Body Strength  X   Rt Elbow Flexion Strength 3+ (F+)   Rt Elbow Extension Strength 3- (F-)   Comments pain with elbow flex/ext and weakness otherwise WFL BUE   Sensation Upper Body   Upper Extremity  Sensation  X   Comments BUE intact to light touch however reports intermittent numbness. has improved since taking gabapentin   Upper Body Muscle Tone   Upper Body Muscle Tone  WDL   Neurological Concerns   Neurological Concerns No   Coordination Upper Body   Coordination WDL   Balance Assessment   Sitting Balance (Static) Fair +   Sitting Balance (Dynamic) Fair   Standing Balance (Static) Poor   Standing Balance (Dynamic) Trace +   Weight Shift Sitting Fair   Weight Shift Standing Poor   Comments FWW for sit>Stand   Bed Mobility    Comments was up in chair   ADL Assessment   Eating Independent   Grooming Supervision;Seated  (oral care, facial hygiene)   Upper Body Dressing Supervision   Lower Body Dressing Moderate Assist  (socks)   Toileting   (declined need)   Functional Mobility   Sit to Stand Minimal Assist   Bed, Chair, Wheelchair Transfer Minimal Assist   Toilet Transfers Minimal Assist   Transfer Method Stand Pivot   Mobility chair> STS> stand/pivot to commode> chair   Comments w/FWW for sit>stand.   Visual Perception   Visual Perception  WDL   Edema / Skin Assessment   Edema / Skin  WDL   Activity Tolerance   Sitting in Chair pre and post session   Standing 2 min   Comments limited d/t pain and weakness   Patient / Family Goals   Patient / Family Goal #1 to get better   Short Term Goals   Short Term Goal # 1 Pt will complete toilet transfer SBA   Short Term Goal # 2 Pt will complete LB dressing with A/E as needed SBA   Short Term Goal # 3 Pt will complete standing grooming tasks SBA   Short Term Goal # 4 Pt will complete toileting SBA   Education Group   Education Provided Role of Occupational Therapist;Activities of Daily Living   Role of Occupational Therapist Patient Response Patient;Acceptance;Explanation;Demonstration;Verbal Demonstration;Action Demonstration   ADL Patient Response Patient;Acceptance;Demonstration;Explanation;Verbal Demonstration;Action Demonstration   Occupational Therapy Initial  Treatment Plan    Treatment Interventions Self Care / Activities of Daily Living;Adaptive Equipment;Neuro Re-Education / Balance;Therapeutic Exercises;Therapeutic Activity   Treatment Frequency 4 Times per Week   Duration Until Therapy Goals Met   Problem List   Problem List Decreased Active Daily Living Skills;Decreased Upper Extremity Strength Right;Decreased Functional Mobility;Decreased Activity Tolerance;Impaired Sensation Right Upper Extremity;Impaired Sensation Left Upper Extremity;Impaired Postural Control / Balance   Anticipated Discharge Equipment and Recommendations   DC Equipment Recommendations Unable to determine at this time   Discharge Recommendations Recommend post-acute placement for additional occupational therapy services prior to discharge home   Interdisciplinary Plan of Care Collaboration   IDT Collaboration with  Nursing;Physical Therapist   Patient Position at End of Therapy Seated;Chair Alarm On;Call Light within Reach;Tray Table within Reach   Collaboration Comments RN updated   Session Information   Date / Session Number  1/27, #1 (1/4, 2/2)

## 2025-01-27 NOTE — PROGRESS NOTES
[facility] Medicine Daily Progress Note    Date of Service  1/27/2025    Chief Complaint  Oracio Jurado is a 44 y.o. male admitted 1/26/2025 with ascending weakness/numbness and tingling.    Hospital Course    44-year-old male with history of cocaine abuse, PE on Eliquis presented 1/9 with weakness and numbness on his lower extremities. Patient states his symptoms started about 1 month before the admission, however progressively worsened the past 2 weeks making it difficult for the patient to walk. States it started at his feet, then to his knees and to his epigastric area below his rib cage with associated numbness and tingling and sciatic pain that radiates down his bilateral lower extremities, worse on the left. Denies any bowel or bladder incontinence, denies any saddle anesthesia, and does feel some muscle spasms over his torso area. Denies any shortness of breath or trouble breathing.  Denied any fever or drug abuse.  No new medication starts, denies any sore throat, no bloody diarrhea in the past few months. Patient transferred to [facility] for neurologic evaluation and MRI.  MRI for lumbar spine did not show any stenosis however for thoracic spine showed abnormal enhancement would be unlikely to represent multiple sclerosis, possible infection versus autoimmune myelitis also acute cord infarction involving the central gray matter, neurologist evaluated the patient who recommended discontinue Solu-Medrol which was started on admission and neurology team feels the patient has peripheral neuropathy versus toxic neuropathy, no need for LP and he did not feel patient has MS or GBS.  B12 was very low at and homocystine was high, B12 supplementation was started  PT and OT evaluated the patient recommended placement.        Interval Problem Update  -Evaluated examined the patient at bedside, patient states his pain and numbness improved than yesterday  -Continue B12 supplementation IM and gabapentin  -Discussed with  neurology team, patient will need more workup for neuropathy and MRI brain and C-spine was ordered.  -Labs reviewed around baseline, all images and labs were discussed in detail with the patient, answered all his questions        I have discussed this patient's plan of care and discharge plan at IDT rounds today with Case Management, Nursing, Nursing leadership, and other members of the IDT team.    Consultants/Specialty  neurology    Code Status  Full Code    Disposition  The patient is medically cleared for discharge to home or a post-acute facility.  Anticipate discharge to: skilled nursing facility    I have placed the appropriate orders for post-discharge needs.    Review of Systems  Review of Systems   Constitutional:  Negative for chills, fever and malaise/fatigue.   Respiratory:  Negative for cough and shortness of breath.    Cardiovascular:  Negative for chest pain, palpitations and leg swelling.   Gastrointestinal:  Negative for abdominal pain, diarrhea, heartburn, nausea and vomiting.   Genitourinary:  Negative for dysuria, frequency and urgency.   Neurological:  Positive for tingling, focal weakness and weakness. Negative for dizziness and headaches.   All other systems reviewed and are negative.       Physical Exam  Temp:  [36 °C (96.8 °F)-36.9 °C (98.4 °F)] 36.1 °C (96.9 °F)  Pulse:  [58-70] 60  Resp:  [16-18] 16  BP: (106-126)/(63-73) 118/68  SpO2:  [91 %-93 %] 93 %    Physical Exam  Vitals and nursing note reviewed.   Constitutional:       General: He is not in acute distress.     Appearance: Normal appearance. He is not ill-appearing.   HENT:      Head: Normocephalic and atraumatic.      Jaw: There is normal jaw occlusion.      Right Ear: Hearing normal.      Left Ear: Hearing normal.      Nose: Nose normal.      Mouth/Throat:      Lips: Pink.      Mouth: Mucous membranes are moist.   Eyes:      Extraocular Movements: Extraocular movements intact.      Conjunctiva/sclera: Conjunctivae normal.       Pupils: Pupils are equal, round, and reactive to light.   Neck:      Vascular: No carotid bruit.   Cardiovascular:      Rate and Rhythm: Normal rate and regular rhythm.      Pulses: Normal pulses.      Heart sounds: Normal heart sounds, S1 normal and S2 normal.   Pulmonary:      Effort: Pulmonary effort is normal.      Breath sounds: Normal breath sounds and air entry. No stridor.   Musculoskeletal:      Cervical back: Normal range of motion and neck supple.      Right lower leg: No edema.      Left lower leg: No edema.   Skin:     General: Skin is warm and dry.      Capillary Refill: Capillary refill takes less than 2 seconds.   Neurological:      Mental Status: He is alert and oriented to person, place, and time.      Sensory: Sensory deficit present.      Motor: Weakness present.      Gait: Gait abnormal.   Psychiatric:         Attention and Perception: Attention and perception normal.         Mood and Affect: Mood and affect normal.         Speech: Speech normal.         Behavior: Behavior normal. Behavior is cooperative.         Fluids    Intake/Output Summary (Last 24 hours) at 1/27/2025 1403  Last data filed at 1/27/2025 1100  Gross per 24 hour   Intake 570 ml   Output 2225 ml   Net -1655 ml        Laboratory  Recent Labs     01/26/25  0643 01/27/25  0343   WBC 6.4 10.3   RBC 3.96* 4.15*   HEMOGLOBIN 11.9* 12.3*   HEMATOCRIT 35.4* 37.1*   MCV 89.4 89.4   MCH 30.1 29.6   MCHC 33.6 33.2   RDW 44.1 42.4   PLATELETCT 256 255   MPV 9.2 9.3     Recent Labs     01/26/25  0643 01/27/25  0343   SODIUM 135 135   POTASSIUM 4.2 4.8   CHLORIDE 101 102   CO2 22 22   GLUCOSE 105* 187*   BUN 19 24*   CREATININE 1.18 1.38   CALCIUM 9.5 9.8                   Imaging  MR-LUMBAR SPINE-WITH & W/O   Final Result      1.  Transitional lumbosacral junction. Vestigial ribs at the T12 level. Partial sacralization of L5.   2.  L3-4 mild facet hypertrophy.   3.  L4-5 mild facet hypertrophy. Mild bilateral foraminal narrowing.   4.   "L5-S1 mild right foraminal stenosis.   5.  Prominent epidural fat at the lower lumbar/lumbosacral levels. No central stenosis on the basis of disc disease or spondylotic change.      MR-THORACIC SPINE-WITH & W/O   Final Result      1.  Diffusely abnormal T2 hyperintense central cord signal from at least C6-7 down to T10-T11. Questionable corresponding enhancement demonstrated just at the T6-T7 level. Diffuse cord involvement in this fashion would be unlikely to represent multiple    sclerosis. Longitudinal multilevel cord involvement in this fashion may represent infectious or autoimmune myelitis. Viral etiologies such as West Nile virus might have this appearance. Acute cord infarction involves the central gray matter, however, the    pattern of involvement does not show the characteristic \"H\" morphology. Diffuse thoracic cord edema based on venous congestion can be seen associated with dural AV fistula. (Foix-Alajouanine syndrome). Finally, neuromyelitis optica (NMO) can manifest as    multilevel longitudinal cord lesions, however, these are usually more segmented with lesions spanning 3 or 4 levels but uncommonly the entire cord in continuity.      MR-BRAIN-WITH & W/O    (Results Pending)   MR-CERVICAL SPINE-WITH & W/O    (Results Pending)        Assessment/Plan  * Neuropathy  Assessment & Plan  Continue gabapentin  B12 is very low, started supplementation  Workup labs are pending  Neurology on board, appreciate recommendations  Consider Lyrica or Cymbalta if needed  Improving    B12 deficiency  Assessment & Plan  With the neuropathy and elevated homocystine  Start with IM    Bilateral leg weakness- (present on admission)  Assessment & Plan  For couple months  before admission with tingling  MRI showed abnormal sinus with multiple differential diagnosis however neurologist feels the patient has neuropathy due to B12 deficiency and did not recommend any LP or other MRI and no need for steroid  PT and OT and " PMR    Cocaine abuse (HCC)- (present on admission)  Assessment & Plan  Urine drug screen was positive for marijuana and cocaine  Encouraged the patient to quit    Bilateral pulmonary embolism (HCC)- (present on admission)  Assessment & Plan  Present on last admission, discharged 1/9/2025 found to have bilateral pulmonary emboli   Continue Eliquis         VTE prophylaxis:   SCDs/TEDs   enoxaparin ppx      I have performed a physical exam and reviewed and updated ROS and Plan today (1/27/2025). In review of yesterday's note (1/26/2025), there are no changes except as documented above.    Greater than 51 minutes spent prepping to see patient (e.g. review of tests) obtaining and/or reviewing separately obtained history. Performing a medically appropriate examination and/ evaluation.  Counseling and educating the patient/family/caregiver.  Ordering medications, tests, or procedures.  Referring and communicating with other health care professionals.  Documenting clinical information in EPIC.  Independently interpreting results and communicating results to patient/family/caregiver.  Care coordination

## 2025-01-27 NOTE — ASSESSMENT & PLAN NOTE
1/31/2025  Urine drug screen was positive for marijuana and cocaine  Encouraged the patient to quit

## 2025-01-27 NOTE — PROGRESS NOTES
Referring Physician: Elmer Goetz M.D.    S:  Feeling a little stronger and less pain today. Sitting up in bedside chair.   Scheduled Medications   Medication Dose Frequency    senna-docusate  2 Tablet Q EVENING    apixaban  5 mg BID    carvedilol  6.25 mg BID WITH MEALS    sacubitril-valsartan  1 Tablet BID    furosemide  20 mg DAILY    Pharmacy Consult Request  1 Each PHARMACY TO DOSE    gabapentin  600 mg TID    cyanocobalamin  1,000 mcg DAILY    Followed by    [START ON 1/29/2025] cyanocobalamin  1,000 mcg Q7 DAYS    Followed by    [START ON 2/26/2025] cyanocobalamin  1,000 mcg Q30 DAYS         O:      Vitals:    01/27/25 0722   BP: 118/68   Pulse: 60   Resp: 16   Temp: 36.1 °C (96.9 °F)   SpO2: 91%       Physical Examination:   General: Patient is awake and in no acute distress  Neck: There is normal range of motion  CV: regular rate   Extremities:  clear, dry, intact, without peripheral edema    Neurologic Examination:  Mental status: Awake, alert and fully oriented  Speech and language: Speech is clear and fluent. The patient is able to name and repeat, and follow commands  Cranial nerve exam: PERRL. Fields and versions intact. Face is symmetric. SILT. Tongue is midline.  Motor exam:   Upper Extremity  Myotome R L   Shoulder flexion C5 5 5   Elbow flexion C5 3 (pain) 5   Wrist extension C6 5 5   Elbow extension C7 3 (pain) 5   Finger flexion C8 5 5   Finger abduction T1 5 5      Lower Extremity Myotome R L   Hip flexion L2 4+ 4+ (pain)   Knee extension L3 5 4+ (pain)   Ankle dorsiflexion L4 5 (pain) 5 (pain)   Toe extension L5 5 (pain) 5 (pain)   Ankle plantarflexion S1 5 (pain) 5 (pain)   Strength graded on maximal effort, exam heavily limited by right elbow, left knee, bilateral feet pain. No abnormal movements or fasciculations on exam. Tone normal.   Sensory exam: Reacts to tactile in all 4 extremities, no neglect to double stim. Still with burning/pins-and-needles pain in feet on palpation.   Deep  tendon reflexes:  2+ throughout in uppers, no Dee's. Reflexes difficult to assess in lowers due to pain/withdrawal on percussion. Unable to assess toes due to pain. No overtly pathological reflexes seen or obvious diminished / overly brisk reflexes.   Gait: Deferred         Latest Reference Range & Units 01/26/25 12:04 01/27/25 00:50   Phosphorus 2.5 - 4.5 mg/dL 2.7    Amphetamines Urine Negative   Negative   Barbiturates Negative   Negative   Benzodiazepines Negative   Negative   Cannabinoid Metab Negative   Positive !   Cocaine Metabolite Negative   Positive !   Methadone Negative   Negative   Opiates Negative   Positive !   Oxycodone Negative   Positive !   Phencyclidine -Pcp Negative   Negative   Propoxyphene Negative   Negative   Folate -Folic Acid >4.0 ng/mL 23.3    Homocysteine <11.00 umol/L 162.08 (H)    Vitamin B12 -True Cobalamin 211 - 911 pg/mL 156 (L)    TSH 0.380 - 5.330 uIU/mL 0.978    Hepatitis A Virus Ab, IgM Non-Reactive  Non-Reactive    Hepatitis B Surface Antigen Non-Reactive  Non-Reactive    Hepatitis B Cors Ab,IgM Non-Reactive  Non-Reactive    Hepatitis C Antibody Non-Reactive  Non-Reactive    HIV Ag/Ab Combo Assay Non Reactive  Non-Reactive    Syphilis, Treponemal Qual Non-Reactive  Non-Reactive    Fentanyl, Urine Negative   Positive !   !: Data is abnormal  (H): Data is abnormally high  (L): Data is abnormally low      MR-LUMBAR SPINE-WITH & W/O   Final Result      1.  Transitional lumbosacral junction. Vestigial ribs at the T12 level. Partial sacralization of L5.   2.  L3-4 mild facet hypertrophy.   3.  L4-5 mild facet hypertrophy. Mild bilateral foraminal narrowing.   4.  L5-S1 mild right foraminal stenosis.   5.  Prominent epidural fat at the lower lumbar/lumbosacral levels. No central stenosis on the basis of disc disease or spondylotic change.      MR-THORACIC SPINE-WITH & W/O   Final Result      1.  Diffusely abnormal T2 hyperintense central cord signal from at least C6-7 down to  "T10-T11. Questionable corresponding enhancement demonstrated just at the T6-T7 level. Diffuse cord involvement in this fashion would be unlikely to represent multiple    sclerosis. Longitudinal multilevel cord involvement in this fashion may represent infectious or autoimmune myelitis. Viral etiologies such as West Nile virus might have this appearance. Acute cord infarction involves the central gray matter, however, the    pattern of involvement does not show the characteristic \"H\" morphology. Diffuse thoracic cord edema based on venous congestion can be seen associated with dural AV fistula. (Foix-Alajouanine syndrome). Finally, neuromyelitis optica (NMO) can manifest as    multilevel longitudinal cord lesions, however, these are usually more segmented with lesions spanning 3 or 4 levels but uncommonly the entire cord in continuity.            Impression & Recommendations:  44 year old male with history of polysubstance use, heart failure who presents with 1 month lower extremity weakness, pins-and-needles pain in feet, and subjective tightness in thighs and abdomen found on MRI to have extensive non-enhancing T2 lesion in posterior column of thoracic spine C6-T11 and severe B12 deficiency on labs.     Neuropathy workup pending. Will add AQP-4 serum to check for NMOSD. Would check rest of neuroaxis for any other lesions. On aggressive B12 repletion, pain control, and physical therapy. Unclear why he is B12 deficient - he does have history of nitrous oxide use but states it is over a year ago.     Plan:  - Continue B12 IM 1000mcg daily x 3 days (1/26-1/28), then weekly x 4 weeks, then maintenance once monthly. Would stick to IM route rather than oral unless impaired absorption can be ruled out.  - Continue gabapentin 600mg TID for neuropathy. Can also consider adjunct of Cymbalta and/or Lyrica if he needs more pain control  - Neuropathy w/u pending: MMA, AVERY, Vitamin E, Copper, sensory motor neuropathy panel   - " Check serum Aquaporin-4 receptor IgG  - Check MRI brain and c-spine with and without to complete neuroaxis imaging.   - PT/OT, eval for rehab  - Neurology will follow    The evaluation of the patient, and recommended management, was discussed with Dr. Velasco and the care team      TODD Royal  Neurohospitalist Services

## 2025-01-27 NOTE — THERAPY
Physical Therapy   Initial Evaluation     Patient Name: Oracio Jurado  Age:  44 y.o., Sex:  male  Medical Record #: [id number]  Today's Date: 1/27/2025     Precautions  Precautions: Fall Risk    Assessment  Patient is 44 y.o. male who presents on 1/26/2025 due to ascending weakness with numbness and tingling in bilateral lower extremities up to his abdomen. Symptoms started about 1 month ago, however progressively worsened the past 2 weeks making it difficult for the patient to walk.     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 Patient in bed, agreeable for the session. Able to demonstrate functional mobility tasks as detailed below. Currently appears to be below baseline level of functional mobility. Will continue to benefit from PT services to help improve overall functional mobility. Recommend post-acute placement at this time.     Plan    Physical Therapy Initial Treatment Plan   Treatment Plan : Bed Mobility, Equipment, Family / Caregiver Training, Gait Training, Neuro Re-Education / Balance, Stair Training, Therapeutic Activities, Therapeutic Exercise  Treatment Frequency: 4 Times per Week  Duration: Until Therapy Goals Met    DC Equipment Recommendations: Unable to determine at this time  Discharge Recommendations: Recommend post-acute placement for additional physical therapy services prior to discharge home     Objective     01/27/25 0910   Time In/Time Out   Therapy Start Time 0829   Therapy End Time 0910   Total Therapy Time 41   Initial Contact Note    Initial Contact Note Order Received and Verified, Physical Therapy Evaluation in Progress with Full Report to Follow.    Precautions   Precautions Fall Risk;Swallow Precautions   Vitals   Pulse 73   Patient BP Position Sitting  (EOB)   Blood Pressure 135/75   Pulse Oximetry 96 %   O2 Delivery Device None - Room Air   Vitals Comments Post activity, seated in the chair: /79, HR 81, SpO2 93. RN made aware of drop in BP.   Pain   Pain Scales 0 to 10 Scale    Intervention Rest   Pain 0 - 10 Group   Location Foot   Location Orientation Right;Left   Therapist Pain Assessment Prior to Activity;During Activity;Post Activity;Post Activity Pain Same as Prior to Activity  (Patient reported pain in BLE, worse in the feet)   Prior Living Situation   Prior Services Home-Independent   Housing / Facility [address]   Steps Into Home 1  ([place])   Steps In Home 0   Rail None   Equipment Owned Front-Wheel Walker;4-Wheel Walker   Lives with - Patient's Self Care Capacity Child Less than 18 Years of Age  (10Y, 13Y old)   Comments Patient mentioned that he has a friend who comes in everyday, M-F, to assist him as needed. On the weekends, he has other friends that are able to come in and assist as needed.   Prior Level of Functional Mobility   Bed Mobility Independent   Transfer Status Independent   Ambulation Independent   Ambulation Distance Community   Assistive Devices Used None   Stairs Independent   Comments The above PLOF is prior to Dec 25, 2024. Since Christmas, patient has declined with his functional mobility, to a point, that he was requiring assistance with ambulation using FWW/4WW to and from the bathroom. Patient is currently unemployed for over a year.   History of Falls   History of Falls No   Date of Last Fall   (Near falls while ambulating with AD)   Cognition    Orientation Level Oriented x 4   Level of Consciousness Alert   Passive ROM Lower Body   Passive ROM Lower Body WDL   Active ROM Lower Body    Active ROM Lower Body  X   Comments B/L hip flexion-slightly impaired, B/L knee extension-slightly impaired, B/L knee  flexion-slightly impaired, B/L ankle DF/PF-partial ROM; Overall L slightly worse than R.   Strength Lower Body   Lower Body Strength  X   Comments B/L hip flexor 3/5, B/L knee extensor 3+/5, B/L knee flexor 3/5, B/L ankle DF/PF 3-/5   Sensation Lower Body   Lower Extremity Sensation   X   Comments Reports of numbness/tingling in LE, worse in the feet   Lower Body Muscle Tone   Lower Body Muscle Tone  WDL   Coordination Lower Body    Coordination Lower Body  X   Comments Heel to shin-increased effort and time, generalized slow speed; alternate ankle movements-unable to complete the task due to pain   Other Treatments   Other Treatments Provided Patient mentioned that he lives in a remote location and was unable to refill his medications on time. He was on medications for his gout and nerve pain/neuropathy. Discussed about DC recommendations-patient is agreeable for rehab placement at this time. Educated on importance of daily & frequent mobility, OOB to chair for meals with assistance from nursing staff. Reinforced to continue performing AROM of BLE (hip flexion, long arc quads, ankle DF/PF) through out the day, 5-10 reps as able.   Balance Assessment   Sitting Balance (Static) Fair +   Sitting Balance (Dynamic) Fair   Standing Balance (Static) Poor +  (~ 10-15s)   Standing Balance (Dynamic) Trace +   Weight Shift Sitting Good   Weight Shift Standing Poor   Comments Seated EOB; Standing with FWW   Bed Mobility    Supine to Sit Contact Guard Assist   Scooting Contact Guard Assist  (Towards EOB, use of bed rail)   Rolling Contact Guard Assist   Comments HOB flat, without use of rails, towards L side; cues for log roll technique; increased time & effort to perform the task   Gait Analysis   Gait Level Of Assist Unable to Participate   Comments Attempted to perform step in place, side step by bed, patient having increased difficulty with weight due to pain in B/L foot, increased difficulty to maintain standing.    Functional Mobility   Sit to Stand Minimal Assist   Bed, Chair, Wheelchair Transfer Minimal Assist   Transfer Method Squat Pivot   Mobility Bed-EOB-sit-stand reps (x2 times) with FWW-squat pivot towards L-chair   Comments Sit-stand: cues for appropriate foot placement, hand placement, trunk posture, use of momentum strategy; Transfer: cues for hand placement, sequencing   6 Clicks Assessment - How much HELP from from another person do you currently need... (If the patient hasn't done an activity recently, how much help from another person do you think he/she would need if he/she tried?)   Turning from your back to your side while in a flat bed without using bedrails? 3   Moving from lying on your back to sitting on the side of a flat bed without using bedrails? 3   Moving to and from a bed to a chair (including a wheelchair)? 3   Standing up from a chair using your arms (e.g., wheelchair, or bedside chair)? 3   Walking in hospital room? 1   Climbing 3-5 steps with a railing? 1   6 clicks Mobility Score 14   Activity Tolerance   Sitting in Chair Post session   Patient / Family Goals    Patient / Family Goal #1 To return to prior level of functional mobility   Short Term Goals    Short Term Goal # 1 Patient will perform supine-sit, sit-supine with HOB flat without rails with supervision in 6 visits to safely get in & out of bed   Short Term Goal # 2 Patient will perform sit-stand with LRAD with supervision in 6 visits to progress with functional mobility   Short Term Goal # 3 Patient will perform chair transfers with LRAD with supervision in 6 visits to safely get OOB to chair   Short Term Goal # 4 Patient will ambulate 100 feet with LRAD with supervision in 6 visits to safely ambulate household distance   Short Term Goal # 5 Patient will negotiate 1 step threshold with LRAD with supervision in 6 visits to safely get in & out home   Education Group   Education Provided Role of Physical Therapist   Role of Physical  Therapist Patient Response Patient;Acceptance;Explanation;Verbal Demonstration   Physical Therapy Initial Treatment Plan    Treatment Plan  Bed Mobility;Equipment;Family / Caregiver Training;Gait Training;Neuro Re-Education / Balance;Stair Training;Therapeutic Activities;Therapeutic Exercise   Treatment Frequency 4 Times per Week   Duration Until Therapy Goals Met   Problem List    Problems Pain;Impaired Bed Mobility;Impaired Transfers;Impaired Ambulation;Functional Strength Deficit;Impaired Balance;Functional ROM Deficit;Impaired Coordination   Anticipated Discharge Equipment and Recommendations   DC Equipment Recommendations Unable to determine at this time   Discharge Recommendations Recommend post-acute placement for additional physical therapy services prior to discharge home   Interdisciplinary Plan of Care Collaboration   IDT Collaboration with  Nursing;Occupational Therapist;Certified Nursing Assistant;Physician   Patient Position at End of Therapy Call Light within Reach;Tray Table within Reach;Seated;Chair Alarm On;Other (Comments)  (RN and CNA made aware that chair alarm box need to be replaced)   Session Information   Date / Session Number  1/27-1(1/4, 2/2)

## 2025-01-27 NOTE — ASSESSMENT & PLAN NOTE
1/31/2025  Continue gabapentin  B12 is very low, started supplementation  Workup labs are pending  Neurology on board, appreciate recommendations  Consider Lyrica or Cymbalta if needed  Improving

## 2025-01-27 NOTE — DISCHARGE PLANNING
[facility]itional [facility] Coordination     Referral from: Dr Goetz  Insurance Provider on Facesheet: [facility]  Potential Rehab Diagnosis: peripheral neuropathy +/- subacute combined degeneration, versus a toxic neuropathy     Chart review indicates patient may have on going medical management and may have therapy needs to possibly meet inpatient rehab facility criteria with the goal of returning to community.     D/C support: TBD     Physiatry consultation pended per protocol. [facility] is not contracted with [facility], recommend referring to post-acute facilities in [place] if appropriate. [facility] will no longer follow, please reach out if PMR consult is requested for medical management [id number].     Thank you for the referral.

## 2025-01-27 NOTE — CARE PLAN
The patient is Stable - Low risk of patient condition declining or worsening    Shift Goals  Clinical Goals: Neuro stability; pain mgmt  Patient Goals: Pain management  Family Goals: JOSE    Progress made toward(s) clinical / shift goals:  Pt AOX4, pleasant and cooperative; c/o pain to bilateral feet; treated w/ oxy to pt satisfaction; pt calls appropriately; Bed low and locked; call bell and belongings in reach      Problem: Knowledge Deficit - Standard  Goal: Patient and family/care givers will demonstrate understanding of plan of care, disease process/condition, diagnostic tests and medications  Description: Target End Date:  1-3 days or as soon as patient condition allows    Document in Patient Education    1.  Patient and family/caregiver oriented to unit, equipment, visitation policy and means for communicating concern  2.  Complete/review Learning Assessment  3.  Assess knowledge level of disease process/condition, treatment plan, diagnostic tests and medications  4.  Explain disease process/condition, treatment plan, diagnostic tests and medications  Outcome: Progressing     Problem: Neuro Status  Goal: Neuro status will remain stable or improve  Description: Target End Date:  Prior to discharge or change in level of care    Document on Neuro assessment in the Assessment flowsheet    1.  Assess and monitor neurologic status per provider order/protocol/unit policy  2.  Assess level of consciousness and orientation  3.  Assess for speech, dysarthria, dysphagia, facial symmetry  4.  Assess visual field, eye movements, gaze preference, pupil reaction and size  5.  Assess muscle strength and motor response in all four extremities  6.  Assess for sensation (numbness and tingling)  7.  Assess basic neuro reflexes (cough, gag, corneal)  8.  Identify changes in neuro status and report to provider for testing/treatment orders  Outcome: Progressing

## 2025-01-28 LAB — COPPER SERPL-MCNC: 174.6 UG/DL (ref 70–140)

## 2025-01-28 PROCEDURE — A9270 NON-COVERED ITEM OR SERVICE: HCPCS

## 2025-01-28 PROCEDURE — 700102 HCHG RX REV CODE 250 W/ 637 OVERRIDE(OP)

## 2025-01-28 PROCEDURE — 99232 SBSQ HOSP IP/OBS MODERATE 35: CPT

## 2025-01-28 PROCEDURE — 700111 HCHG RX REV CODE 636 W/ 250 OVERRIDE (IP)

## 2025-01-28 PROCEDURE — 99233 SBSQ HOSP IP/OBS HIGH 50: CPT | Performed by: INTERNAL MEDICINE

## 2025-01-28 PROCEDURE — 97530 THERAPEUTIC ACTIVITIES: CPT

## 2025-01-28 PROCEDURE — 770006 HCHG ROOM/CARE - MED/SURG/GYN SEMI*

## 2025-01-28 RX ORDER — CYANOCOBALAMIN 1000 UG/ML
1000 INJECTION, SOLUTION INTRAMUSCULAR; SUBCUTANEOUS
Status: CANCELLED | OUTPATIENT
Start: 2025-02-28

## 2025-01-28 RX ORDER — CYANOCOBALAMIN 1000 UG/ML
1000 INJECTION, SOLUTION INTRAMUSCULAR; SUBCUTANEOUS
Status: CANCELLED | OUTPATIENT
Start: 2025-01-31 | End: 2025-02-28

## 2025-01-28 RX ORDER — CYANOCOBALAMIN 1000 UG/ML
1000 INJECTION, SOLUTION INTRAMUSCULAR; SUBCUTANEOUS DAILY
Status: CANCELLED | OUTPATIENT
Start: 2025-01-28 | End: 2025-01-31

## 2025-01-28 RX ADMIN — OXYCODONE HYDROCHLORIDE 10 MG: 10 TABLET ORAL at 07:38

## 2025-01-28 RX ADMIN — CYANOCOBALAMIN 1000 MCG: 1000 INJECTION, SOLUTION INTRAMUSCULAR; SUBCUTANEOUS at 04:19

## 2025-01-28 RX ADMIN — SACUBITRIL AND VALSARTAN 1 TABLET: 24; 26 TABLET, FILM COATED ORAL at 04:19

## 2025-01-28 RX ADMIN — FUROSEMIDE 20 MG: 20 TABLET ORAL at 04:18

## 2025-01-28 RX ADMIN — GABAPENTIN 600 MG: 300 CAPSULE ORAL at 13:24

## 2025-01-28 RX ADMIN — ACETAMINOPHEN 650 MG: 325 TABLET ORAL at 07:38

## 2025-01-28 RX ADMIN — SACUBITRIL AND VALSARTAN 1 TABLET: 24; 26 TABLET, FILM COATED ORAL at 17:17

## 2025-01-28 RX ADMIN — APIXABAN 5 MG: 5 TABLET, FILM COATED ORAL at 04:19

## 2025-01-28 RX ADMIN — GABAPENTIN 600 MG: 300 CAPSULE ORAL at 17:17

## 2025-01-28 RX ADMIN — CARVEDILOL 6.25 MG: 6.25 TABLET, FILM COATED ORAL at 08:22

## 2025-01-28 RX ADMIN — APIXABAN 5 MG: 5 TABLET, FILM COATED ORAL at 17:17

## 2025-01-28 RX ADMIN — OXYCODONE HYDROCHLORIDE 10 MG: 10 TABLET ORAL at 21:21

## 2025-01-28 RX ADMIN — SENNOSIDES AND DOCUSATE SODIUM 2 TABLET: 50; 8.6 TABLET ORAL at 17:17

## 2025-01-28 RX ADMIN — CARVEDILOL 6.25 MG: 6.25 TABLET, FILM COATED ORAL at 17:17

## 2025-01-28 RX ADMIN — GABAPENTIN 600 MG: 300 CAPSULE ORAL at 04:18

## 2025-01-28 ASSESSMENT — GAIT ASSESSMENTS
DEVIATION: ANTALGIC;BRADYKINETIC;OTHER (COMMENT)
GAIT LEVEL OF ASSIST: CONTACT GUARD ASSIST
ASSISTIVE DEVICE: FRONT WHEEL WALKER
DISTANCE (FEET): 20

## 2025-01-28 ASSESSMENT — ENCOUNTER SYMPTOMS
TINGLING: 1
COUGH: 0
DIARRHEA: 0
HEADACHES: 0
FEVER: 0
PALPITATIONS: 0
SHORTNESS OF BREATH: 0
CHILLS: 0
DIZZINESS: 0
VOMITING: 0
ABDOMINAL PAIN: 0
FOCAL WEAKNESS: 1
NAUSEA: 0
WEAKNESS: 1
HEARTBURN: 0

## 2025-01-28 ASSESSMENT — PAIN DESCRIPTION - PAIN TYPE
TYPE: ACUTE PAIN

## 2025-01-28 ASSESSMENT — COGNITIVE AND FUNCTIONAL STATUS - GENERAL
STANDING UP FROM CHAIR USING ARMS: A LITTLE
CLIMB 3 TO 5 STEPS WITH RAILING: A LOT
MOVING TO AND FROM BED TO CHAIR: A LITTLE
TURNING FROM BACK TO SIDE WHILE IN FLAT BAD: A LITTLE
MOBILITY SCORE: 17
MOVING FROM LYING ON BACK TO SITTING ON SIDE OF FLAT BED: A LITTLE
SUGGESTED CMS G CODE MODIFIER MOBILITY: CK
WALKING IN HOSPITAL ROOM: A LITTLE

## 2025-01-28 NOTE — CARE PLAN
The patient is Stable - Low risk of patient condition declining or worsening    Shift Goals  Clinical Goals: monitor neuro  Patient Goals: Pain control  Family Goals: JOSE    Progress made toward(s) clinical / shift goals:  Strength to BLE slightly improving. Able to get to chair for most of day. BM today. Continues with Neuropathy to BLE worse in the soles of the feet. Participates with therapies.

## 2025-01-28 NOTE — PROGRESS NOTES
[facility] Medicine Daily Progress Note    Date of Service  1/28/2025    Chief Complaint  Oracio Jurado is a 44 y.o. male admitted 1/26/2025 with ascending weakness/numbness and tingling.    Hospital Course    44-year-old male with history of cocaine abuse, PE on Eliquis presented 1/9 with weakness and numbness on his lower extremities. Patient states his symptoms started about 1 month before the admission, however progressively worsened the past 2 weeks making it difficult for the patient to walk. States it started at his feet, then to his knees and to his epigastric area below his rib cage with associated numbness and tingling and sciatic pain that radiates down his bilateral lower extremities, worse on the left. Denies any bowel or bladder incontinence, denies any saddle anesthesia, and does feel some muscle spasms over his torso area. Denies any shortness of breath or trouble breathing.  Denied any fever or drug abuse.  No new medication starts, denies any sore throat, no bloody diarrhea in the past few months. Patient transferred to [facility] for neurologic evaluation and MRI.  MRI for lumbar spine did not show any stenosis however for thoracic spine showed abnormal enhancement would be unlikely to represent multiple sclerosis, possible infection versus autoimmune myelitis also acute cord infarction involving the central gray matter.  Neurologist evaluated the patient who recommended discontinue Solu-Medrol which was started on admission and neurology team feels the patient has peripheral neuropathy versus toxic neuropathy.  No need for LP and he did not feel patient has MS or GBS.  B12 was very low at and homocystine was high, B12 supplementation was started  PT and OT evaluated the patient recommended placement.        Interval Problem Update  -Evaluated examined the patient at bedside, patient states his pain and numbness improved than yesterday  -Continue B12 supplementation IM and gabapentin  -Discussed with  neurology team, patient will need more workup for neuropathy and MRI brain and C-spine was ordered.  -Labs reviewed around baseline, all images and labs were discussed in detail with the patient, answered all his questions    Patient was seen and examined at bedside.  I have personally reviewed and interpreted vitals, labs, and imaging.    1/28.  Afebrile.  Intermittent bradycardia.  On room air.  Denies fever, chills, chest pains.  States that when he puts his feet on the floor he gets shivering raising his feet all the way up to his torso so that he gets whole body shivers.  He does state strength is improving.  He was stabilized and sent today.    I have discussed this patient's plan of care and discharge plan at IDT rounds today with Case Management, Nursing, Nursing leadership, and other members of the IDT team.    Consultants/Specialty  neurology    Code Status  Full Code    Disposition  The patient is not medically cleared for discharge to home or a post-acute facility.  Anticipate discharge to: home with organized home healthcare and close outpatient follow-up    I have placed the appropriate orders for post-discharge needs.    Review of Systems  Review of Systems   Constitutional:  Negative for chills, fever and malaise/fatigue.   Respiratory:  Negative for cough and shortness of breath.    Cardiovascular:  Negative for chest pain, palpitations and leg swelling.   Gastrointestinal:  Negative for abdominal pain, diarrhea, heartburn, nausea and vomiting.   Genitourinary:  Negative for dysuria, frequency and urgency.   Neurological:  Positive for tingling, focal weakness and weakness. Negative for dizziness and headaches.   All other systems reviewed and are negative.       Physical Exam  Temp:  [36.1 °C (96.9 °F)-36.5 °C (97.7 °F)] 36.4 °C (97.5 °F)  Pulse:  [60-73] 60  Resp:  [14-18] 14  BP: (106-135)/(63-75) 111/73  SpO2:  [91 %-100 %] 100 %    Physical Exam  Vitals and nursing note reviewed.    Constitutional:       General: He is not in acute distress.     Appearance: Normal appearance. He is not ill-appearing.   HENT:      Head: Normocephalic and atraumatic.      Jaw: There is normal jaw occlusion.      Right Ear: Hearing normal.      Left Ear: Hearing normal.      Nose: Nose normal.      Mouth/Throat:      Lips: Pink.      Mouth: Mucous membranes are moist.   Eyes:      Extraocular Movements: Extraocular movements intact.      Conjunctiva/sclera: Conjunctivae normal.      Pupils: Pupils are equal, round, and reactive to light.   Neck:      Vascular: No carotid bruit.   Cardiovascular:      Rate and Rhythm: Normal rate and regular rhythm.      Pulses: Normal pulses.      Heart sounds: Normal heart sounds, S1 normal and S2 normal.   Pulmonary:      Effort: Pulmonary effort is normal.      Breath sounds: Normal breath sounds and air entry. No stridor.   Musculoskeletal:      Cervical back: Normal range of motion and neck supple.      Right lower leg: No edema.      Left lower leg: No edema.   Skin:     General: Skin is warm and dry.      Capillary Refill: Capillary refill takes less than 2 seconds.   Neurological:      Mental Status: He is alert and oriented to person, place, and time.      Sensory: Sensory deficit present.      Motor: Weakness present.      Gait: Gait abnormal.   Psychiatric:         Attention and Perception: Attention and perception normal.         Mood and Affect: Mood and affect normal.         Speech: Speech normal.         Behavior: Behavior normal. Behavior is cooperative.         Fluids    Intake/Output Summary (Last 24 hours) at 1/28/2025 0518  Last data filed at 1/27/2025 2300  Gross per 24 hour   Intake 360 ml   Output 1725 ml   Net -1365 ml        Laboratory  Recent Labs     01/26/25  0643 01/27/25  0343   WBC 6.4 10.3   RBC 3.96* 4.15*   HEMOGLOBIN 11.9* 12.3*   HEMATOCRIT 35.4* 37.1*   MCV 89.4 89.4   MCH 30.1 29.6   MCHC 33.6 33.2   RDW 44.1 42.4   PLATELETCT 256 255   MPV  "9.2 9.3     Recent Labs     01/26/25  0643 01/27/25  0343   SODIUM 135 135   POTASSIUM 4.2 4.8   CHLORIDE 101 102   CO2 22 22   GLUCOSE 105* 187*   BUN 19 24*   CREATININE 1.18 1.38   CALCIUM 9.5 9.8                   Imaging  MR-LUMBAR SPINE-WITH & W/O   Final Result      1.  Transitional lumbosacral junction. Vestigial ribs at the T12 level. Partial sacralization of L5.   2.  L3-4 mild facet hypertrophy.   3.  L4-5 mild facet hypertrophy. Mild bilateral foraminal narrowing.   4.  L5-S1 mild right foraminal stenosis.   5.  Prominent epidural fat at the lower lumbar/lumbosacral levels. No central stenosis on the basis of disc disease or spondylotic change.      MR-THORACIC SPINE-WITH & W/O   Final Result      1.  Diffusely abnormal T2 hyperintense central cord signal from at least C6-7 down to T10-T11. Questionable corresponding enhancement demonstrated just at the T6-T7 level. Diffuse cord involvement in this fashion would be unlikely to represent multiple    sclerosis. Longitudinal multilevel cord involvement in this fashion may represent infectious or autoimmune myelitis. Viral etiologies such as West Nile virus might have this appearance. Acute cord infarction involves the central gray matter, however, the    pattern of involvement does not show the characteristic \"H\" morphology. Diffuse thoracic cord edema based on venous congestion can be seen associated with dural AV fistula. (Foix-Alajouanine syndrome). Finally, neuromyelitis optica (NMO) can manifest as    multilevel longitudinal cord lesions, however, these are usually more segmented with lesions spanning 3 or 4 levels but uncommonly the entire cord in continuity.      MR-BRAIN-WITH & W/O    (Results Pending)   MR-CERVICAL SPINE-WITH & W/O    (Results Pending)        Assessment/Plan  * Neuropathy  Assessment & Plan  1/28/2025  Continue gabapentin  B12 is very low, started supplementation  Workup labs are pending  Neurology on board, appreciate " recommendations  Consider Lyrica or Cymbalta if needed  Improving    B12 deficiency  Assessment & Plan  1/28/2025  With the neuropathy and elevated homocystine  Start with IM    Bilateral leg weakness- (present on admission)  Assessment & Plan  1/28/2025  For couple months before admission with tingling  MRI showed abnormal sinus with multiple differential diagnosis however neurologist feels the patient has neuropathy due to B12 deficiency and did not recommend any LP or other MRI and no need for steroid  PT and OT and PMR    Cocaine abuse (HCC)- (present on admission)  Assessment & Plan  1/28/2025  Urine drug screen was positive for marijuana and cocaine  Encouraged the patient to quit    Bilateral pulmonary embolism (HCC)- (present on admission)  Assessment & Plan  1/28/2025  Present on last admission, discharged 1/9/2025 found to have bilateral pulmonary emboli   Continue Eliquis         VTE prophylaxis: Apixaban    I have performed a physical exam and reviewed and updated ROS and Plan today (1/28/2025). In review of yesterday's note (1/27/2025), there are no changes except as documented above.    Greater than 50 minutes spent prepping to see patient (e.g. review of tests) obtaining and/or reviewing separately obtained history. Performing a medically appropriate examination and/ evaluation.  Counseling and educating the patient/family/caregiver.  Ordering medications, tests, or procedures.  Referring and communicating with other health care professionals.  Documenting clinical information in EPIC.  Independently interpreting results and communicating results to patient/family/caregiver.  Care coordination.

## 2025-01-28 NOTE — PROGRESS NOTES
"Referring Physician: Elmer Goetz M.D.    S: Feels about the same today, perhaps some mild subjective improvement. Awaiting lab results and MRI brain/c-spine  Scheduled Medications   Medication Dose Frequency    senna-docusate  2 Tablet Q EVENING    apixaban  5 mg BID    carvedilol  6.25 mg BID WITH MEALS    sacubitril-valsartan  1 Tablet BID    furosemide  20 mg DAILY    Pharmacy Consult Request  1 Each PHARMACY TO DOSE    gabapentin  600 mg TID    [START ON 1/29/2025] cyanocobalamin  1,000 mcg Q7 DAYS    Followed by    [START ON 2/26/2025] cyanocobalamin  1,000 mcg Q30 DAYS         O:      Vitals:    01/28/25 0928   BP: 116/64   Pulse: (!) 56   Resp: 18   Temp: 36.1 °C (97 °F)   SpO2: 98%       Physical Examination:   General: Patient is awake and in no acute distress  Neck: There is normal range of motion  CV: regular rate   Extremities:  clear, dry, intact, without peripheral edema    Neurologic Examination:  Mental status: Awake, alert and fully oriented  Speech and language: Speech is clear and fluent. The patient is able to name and repeat, and follow commands  Cranial nerve exam: PERRL. Fields and versions intact. Face is symmetric. SILT. Tongue is midline.  Motor exam: 5/5 upper extremities. Left hip flexion 4/5, right 4+/5. Rest of lower extremities 4+/5, limited by \"pins-and-needles\" pain. Tone normal. No abnormal movements on exam.   Sensory exam: Reacts to tactile in all 4 extremities, no neglect to double stim. Still with burning/pins-and-needles pain in feet on palpation, improved today. .   Deep tendon reflexes:  2+ throughout in uppers, no Dee's. 2+ patellar bilaterally. Unable to assess toes due to pain.   Gait: Deferred      Latest Reference Range & Units 01/26/25 12:04 01/27/25 00:50   Phosphorus 2.5 - 4.5 mg/dL 2.7    Amphetamines Urine Negative   Negative   Barbiturates Negative   Negative   Benzodiazepines Negative   Negative   Cannabinoid Metab Negative   Positive !   Cocaine " "Metabolite Negative   Positive !   Methadone Negative   Negative   Opiates Negative   Positive !   Oxycodone Negative   Positive !   Phencyclidine -Pcp Negative   Negative   Propoxyphene Negative   Negative   Folate -Folic Acid >4.0 ng/mL 23.3    Homocysteine <11.00 umol/L 162.08 (H)    Vitamin B12 -True Cobalamin 211 - 911 pg/mL 156 (L)    TSH 0.380 - 5.330 uIU/mL 0.978    Hepatitis A Virus Ab, IgM Non-Reactive  Non-Reactive    Hepatitis B Surface Antigen Non-Reactive  Non-Reactive    Hepatitis B Cors Ab,IgM Non-Reactive  Non-Reactive    Hepatitis C Antibody Non-Reactive  Non-Reactive    HIV Ag/Ab Combo Assay Non Reactive  Non-Reactive    Syphilis, Treponemal Qual Non-Reactive  Non-Reactive    Fentanyl, Urine Negative   Positive !   !: Data is abnormal  (H): Data is abnormally high  (L): Data is abnormally low      MR-LUMBAR SPINE-WITH & W/O   Final Result      1.  Transitional lumbosacral junction. Vestigial ribs at the T12 level. Partial sacralization of L5.   2.  L3-4 mild facet hypertrophy.   3.  L4-5 mild facet hypertrophy. Mild bilateral foraminal narrowing.   4.  L5-S1 mild right foraminal stenosis.   5.  Prominent epidural fat at the lower lumbar/lumbosacral levels. No central stenosis on the basis of disc disease or spondylotic change.      MR-THORACIC SPINE-WITH & W/O   Final Result      1.  Diffusely abnormal T2 hyperintense central cord signal from at least C6-7 down to T10-T11. Questionable corresponding enhancement demonstrated just at the T6-T7 level. Diffuse cord involvement in this fashion would be unlikely to represent multiple    sclerosis. Longitudinal multilevel cord involvement in this fashion may represent infectious or autoimmune myelitis. Viral etiologies such as West Nile virus might have this appearance. Acute cord infarction involves the central gray matter, however, the    pattern of involvement does not show the characteristic \"H\" morphology. Diffuse thoracic cord edema based on venous " congestion can be seen associated with dural AV fistula. (Foix-Alajouanine syndrome). Finally, neuromyelitis optica (NMO) can manifest as    multilevel longitudinal cord lesions, however, these are usually more segmented with lesions spanning 3 or 4 levels but uncommonly the entire cord in continuity.      MR-BRAIN-WITH & W/O    (Results Pending)   MR-CERVICAL SPINE-WITH & W/O    (Results Pending)         Impression & Recommendations:  44 year old male with history of polysubstance use, heart failure who presents with 1 month lower extremity weakness, pins-and-needles pain in feet, and subjective tightness in thighs and abdomen found on MRI to have extensive non-enhancing T2 lesion in posterior column of thoracic spine C6-T11 and severe B12 deficiency on labs.     Treating with gabapentin and IM B12 supplementation. Extended neuropathy workup and MRI Brain+C-spine pending. Unclear why he is B12 deficient - he does have history of recreational nitrous oxide use but claims it was over a year ago. However UDS with multiple positive results.     Plan:  - Continue B12 IM 1000mcg daily x 3 days (1/26-1/28), then weekly x 4 weeks, then maintenance once monthly. Would stick to IM route rather than oral unless impaired absorption can be ruled out.  - Continue gabapentin 600mg TID for neuropathy. Can also consider adjunct of Cymbalta and/or Lyrica if he needs more pain control  - Neuropathy w/u pending: MMA, AVERY, Vitamin E, Copper, sensory motor neuropathy panel, AQP-4   - MRI brain and c-spine w&w/o pending.   - PT/OT, eval for rehab  - Neurohospitalist will follow    The evaluation of the patient, and recommended management, was discussed with Dr. Velasco and the care team      TODD Royal  Neurohospitalist Services

## 2025-01-28 NOTE — DISCHARGE PLANNING
0851  DPA sent SNF referral to [facility].     0859  Agency/Facility Name: [facility] (Swing bed)  Spoke To: Summer  Outcome: DPA called for a fax number to send swing bed referral. Summer provided fax #: [phone].     0976  DPA sent SNF/Swing bed referral to [facility](swing [facility]) to fax number listed above via communication management.     1145  Per Epic response [facility] declined due to current drug/alcohol abuse.     1147  DPA sent SNF referral to [facility] per LOKESH Estrada.     1351  DPA sent [facility] referrals @1350 per LOKESH Estrada.     1559  Agency/Facility Name: [facility]  Outcome: DPA left VM regarding referral.     3775  Agency/Facility Name: [facility]   Outcome: DPA attempted to f/u with referral. DPA was unable to leave VM as voicebox is full.

## 2025-01-28 NOTE — CARE PLAN
The patient is Stable - Low risk of patient condition declining or worsening    Shift Goals  Clinical Goals: Neuro stability; pain mgmt  Patient Goals: Pain management  Family Goals: JOSE    Progress made toward(s) clinical / shift goals:    Problem: Knowledge Deficit - Standard  Goal: Patient and family/care givers will demonstrate understanding of plan of care, disease process/condition, diagnostic tests and medications  Description: Target End Date:  1-3 days or as soon as patient condition allows    Document in Patient Education    1.  Patient and family/caregiver oriented to unit, equipment, visitation policy and means for communicating concern  2.  Complete/review Learning Assessment  3.  Assess knowledge level of disease process/condition, treatment plan, diagnostic tests and medications  4.  Explain disease process/condition, treatment plan, diagnostic tests and medications  Outcome: Progressing     Problem: Neuro Status  Goal: Neuro status will remain stable or improve  Description: Target End Date:  Prior to discharge or change in level of care    Document on Neuro assessment in the Assessment flowsheet    1.  Assess and monitor neurologic status per provider order/protocol/unit policy  2.  Assess level of consciousness and orientation  3.  Assess for speech, dysarthria, dysphagia, facial symmetry  4.  Assess visual field, eye movements, gaze preference, pupil reaction and size  5.  Assess muscle strength and motor response in all four extremities  6.  Assess for sensation (numbness and tingling)  7.  Assess basic neuro reflexes (cough, gag, corneal)  8.  Identify changes in neuro status and report to provider for testing/treatment orders  Outcome: Progressing

## 2025-01-29 LAB
A-TOCOPHEROL VIT E SERPL-MCNC: 5.3 MG/L (ref 5.5–18)
ALBUMIN SERPL BCP-MCNC: 3.8 G/DL (ref 3.2–4.9)
BETA+GAMMA TOCOPHEROL SERPL-MCNC: 1 MG/L (ref 0–6)
BUN SERPL-MCNC: 24 MG/DL (ref 8–22)
CALCIUM ALBUM COR SERPL-MCNC: 9.6 MG/DL (ref 8.5–10.5)
CALCIUM SERPL-MCNC: 9.4 MG/DL (ref 8.5–10.5)
CHLORIDE SERPL-SCNC: 99 MMOL/L (ref 96–112)
CO2 SERPL-SCNC: 25 MMOL/L (ref 20–33)
CREAT SERPL-MCNC: 1.24 MG/DL (ref 0.5–1.4)
ERYTHROCYTE [DISTWIDTH] IN BLOOD BY AUTOMATED COUNT: 43.7 FL (ref 35.9–50)
GFR SERPLBLD CREATININE-BSD FMLA CKD-EPI: 72 ML/MIN/1.73 M 2
GLUCOSE SERPL-MCNC: 97 MG/DL (ref 65–99)
HCT VFR BLD AUTO: 37.1 % (ref 42–52)
HGB BLD-MCNC: 12.4 G/DL (ref 14–18)
INTERPRETATION SERPL IFE-IMP: NORMAL
MAGNESIUM SERPL-MCNC: 2.2 MG/DL (ref 1.5–2.5)
MCH RBC QN AUTO: 29.9 PG (ref 27–33)
MCHC RBC AUTO-ENTMCNC: 33.4 G/DL (ref 32.3–36.5)
MCV RBC AUTO: 89.4 FL (ref 81.4–97.8)
METHYLMALONATE SERPL-SCNC: 14.35 UMOL/L (ref 0–0.4)
PATHOLOGY STUDY: NORMAL
PHOSPHATE SERPL-MCNC: 3.4 MG/DL (ref 2.5–4.5)
PLATELET # BLD AUTO: 271 K/UL (ref 164–446)
PMV BLD AUTO: 8.8 FL (ref 9–12.9)
POTASSIUM SERPL-SCNC: 4.1 MMOL/L (ref 3.6–5.5)
RBC # BLD AUTO: 4.15 M/UL (ref 4.7–6.1)
SODIUM SERPL-SCNC: 133 MMOL/L (ref 135–145)
TEST NAME 95000: NORMAL
WBC # BLD AUTO: 6.8 K/UL (ref 4.8–10.8)

## 2025-01-29 PROCEDURE — 83735 ASSAY OF MAGNESIUM: CPT

## 2025-01-29 PROCEDURE — 700102 HCHG RX REV CODE 250 W/ 637 OVERRIDE(OP)

## 2025-01-29 PROCEDURE — 80069 RENAL FUNCTION PANEL: CPT

## 2025-01-29 PROCEDURE — 99233 SBSQ HOSP IP/OBS HIGH 50: CPT | Performed by: INTERNAL MEDICINE

## 2025-01-29 PROCEDURE — 770006 HCHG ROOM/CARE - MED/SURG/GYN SEMI*

## 2025-01-29 PROCEDURE — 85027 COMPLETE CBC AUTOMATED: CPT

## 2025-01-29 PROCEDURE — A9270 NON-COVERED ITEM OR SERVICE: HCPCS

## 2025-01-29 PROCEDURE — 36415 COLL VENOUS BLD VENIPUNCTURE: CPT

## 2025-01-29 PROCEDURE — A9270 NON-COVERED ITEM OR SERVICE: HCPCS | Performed by: INTERNAL MEDICINE

## 2025-01-29 PROCEDURE — 700102 HCHG RX REV CODE 250 W/ 637 OVERRIDE(OP): Performed by: INTERNAL MEDICINE

## 2025-01-29 PROCEDURE — 700111 HCHG RX REV CODE 636 W/ 250 OVERRIDE (IP)

## 2025-01-29 PROCEDURE — 99232 SBSQ HOSP IP/OBS MODERATE 35: CPT

## 2025-01-29 RX ORDER — LACTULOSE 10 G/15ML
30 SOLUTION ORAL ONCE
Status: COMPLETED | OUTPATIENT
Start: 2025-01-29 | End: 2025-01-29

## 2025-01-29 RX ORDER — POLYETHYLENE GLYCOL 3350 17 G/17G
1 POWDER, FOR SOLUTION ORAL DAILY
Status: DISCONTINUED | OUTPATIENT
Start: 2025-01-29 | End: 2025-02-02 | Stop reason: HOSPADM

## 2025-01-29 RX ORDER — BISACODYL 10 MG
10 SUPPOSITORY, RECTAL RECTAL
Status: DISCONTINUED | OUTPATIENT
Start: 2025-01-29 | End: 2025-02-02 | Stop reason: HOSPADM

## 2025-01-29 RX ORDER — AMOXICILLIN 250 MG
2 CAPSULE ORAL 2 TIMES DAILY
Status: DISCONTINUED | OUTPATIENT
Start: 2025-01-29 | End: 2025-02-02 | Stop reason: HOSPADM

## 2025-01-29 RX ADMIN — GABAPENTIN 600 MG: 300 CAPSULE ORAL at 05:10

## 2025-01-29 RX ADMIN — APIXABAN 5 MG: 5 TABLET, FILM COATED ORAL at 05:10

## 2025-01-29 RX ADMIN — APIXABAN 5 MG: 5 TABLET, FILM COATED ORAL at 17:53

## 2025-01-29 RX ADMIN — CYANOCOBALAMIN 1000 MCG: 1000 INJECTION, SOLUTION INTRAMUSCULAR; SUBCUTANEOUS at 05:16

## 2025-01-29 RX ADMIN — OXYCODONE HYDROCHLORIDE 10 MG: 10 TABLET ORAL at 05:10

## 2025-01-29 RX ADMIN — CARVEDILOL 6.25 MG: 6.25 TABLET, FILM COATED ORAL at 17:53

## 2025-01-29 RX ADMIN — POLYETHYLENE GLYCOL 3350 1 PACKET: 17 POWDER, FOR SOLUTION ORAL at 14:46

## 2025-01-29 RX ADMIN — SACUBITRIL AND VALSARTAN 1 TABLET: 24; 26 TABLET, FILM COATED ORAL at 05:09

## 2025-01-29 RX ADMIN — LACTULOSE 30 ML: 10 SOLUTION ORAL at 14:46

## 2025-01-29 RX ADMIN — SACUBITRIL AND VALSARTAN 1 TABLET: 24; 26 TABLET, FILM COATED ORAL at 17:53

## 2025-01-29 RX ADMIN — FUROSEMIDE 20 MG: 20 TABLET ORAL at 05:09

## 2025-01-29 RX ADMIN — OXYCODONE 5 MG: 5 TABLET ORAL at 20:34

## 2025-01-29 RX ADMIN — GABAPENTIN 600 MG: 300 CAPSULE ORAL at 17:53

## 2025-01-29 RX ADMIN — CARVEDILOL 6.25 MG: 6.25 TABLET, FILM COATED ORAL at 07:49

## 2025-01-29 RX ADMIN — GABAPENTIN 600 MG: 300 CAPSULE ORAL at 11:59

## 2025-01-29 ASSESSMENT — ENCOUNTER SYMPTOMS
FEVER: 0
HEARTBURN: 0
SHORTNESS OF BREATH: 0
DIZZINESS: 0
CHILLS: 0
TINGLING: 1
PALPITATIONS: 0
ABDOMINAL PAIN: 0
COUGH: 0
FOCAL WEAKNESS: 1
HEADACHES: 0
VOMITING: 0
DIARRHEA: 0
WEAKNESS: 1
NAUSEA: 0

## 2025-01-29 ASSESSMENT — PAIN DESCRIPTION - PAIN TYPE
TYPE: ACUTE PAIN

## 2025-01-29 NOTE — THERAPY
Physical Therapy   Daily Treatment     Patient Name: Oracio Jurado  Age:  44 y.o., Sex:  male  Medical Record #: [id number]  Today's Date: 1/28/2025     Precautions  Precautions: Fall Risk;Swallow Precautions    Assessment    Patient seen for PT treatment session. Patient in bed, agreeable for the session. Able to participate with bed mobility, EOB sitting, sit-stand, chair transfer, ambulation with FWW as detailed below. Will continue to benefit from PT services and recommend post-acute placement at this time.      Plan    Treatment Plan Status: Continue Current Treatment Plan  Type of Treatment: Bed Mobility, Equipment, Family / Caregiver Training, Gait Training, Neuro Re-Education / Balance, Stair Training, Therapeutic Activities, Therapeutic Exercise  Treatment Frequency: 4 Times per Week  Treatment Duration: Until Therapy Goals Met    DC Equipment Recommendations: Unable to determine at this time  Discharge Recommendations: Recommend post-acute placement for additional physical therapy services prior to discharge home    Objective     01/28/25 1531   Time In/Time Out   Therapy Start Time 1503   Therapy End Time 1531   Total Therapy Time 28   Precautions   Precautions Fall Risk;Swallow Precautions   Vitals   Pulse (!) 53   Patient BP Position Sitting  (EOB)   Blood Pressure 138/78   Pulse Oximetry 95 %   O2 Delivery Device None - Room Air   Vitals Comments Post activity, seated in the chair: /61, , SpO2 95. RN and CNA made aware of vitals.   Pain   Pain Scales 0 to 10 Scale    Intervention Rest;Repositioned   Pain 0 - 10 Group   Location Back;Foot;Leg   Location Orientation Right;Left   Therapist Pain Assessment Prior to Activity;During Activity;Post Activity;Post Activity Pain Same as Prior to Activity  (Patient reported that his low back pain, LE pain is improved from previous day.)   Cognition    Level of Consciousness Alert   Active ROM Lower Body    Active ROM Lower Body  WDL   Sensation Lower  Body   Lower Extremity Sensation   X   Comments Continues to have numbness in B/L foot   Other Treatments   Other Treatments Provided Reinforced to continue AROM for BLE several times during the day as able. Patient was educated importance of weight shift while seated in the chair and minimize prolonged time sitting in the chair (patient stayed in the chair overnight).   Balance   Sitting Balance (Static) Fair +   Sitting Balance (Dynamic) Fair +   Standing Balance (Static) Fair -   Standing Balance (Dynamic) Poor +   Weight Shift Sitting Good   Weight Shift Standing Poor   Skilled Intervention Verbal Cuing;Facilitation   Comments Seated EOB; Standing with FWW   Bed Mobility    Supine to Sit Standby Assist   Scooting Standby Assist   Rolling Standby Assist   Skilled Intervention Verbal Cuing;Facilitation;Sequencing   Comments HOB flat, use of bed rail, towards L side   Gait Analysis   Gait Level Of Assist Contact Guard Assist   Assistive Device Front Wheel Walker   Distance (Feet) 20  (1 seated rest break)   # of Times Distance was Traveled 2   Deviation Antalgic;Bradykinetic;Other (Comment)  (Reduced step & stride length)   Skilled Intervention Verbal Cuing;Sequencing;Facilitation   Comments Cues for appropriate distance b/w self and AD, directions; heavy reliance on UE support   Functional Mobility   Sit to Stand Minimal Assist   Bed, Chair, Wheelchair Transfer Contact Guard Assist   Transfer Method Stand Step   Mobility Bed-EOB sitting-sit-stand-side steps by the bed-ambulate in the room-EOB-sit-stand-ambulate in the room-EOB-sit-stand-steps to chair   Skilled Intervention Verbal Cuing;Sequencing;Facilitation   Comments W FWW; Cues for hand placement, LE placement, sequencing   6 Clicks Assessment - How much HELP from from another person do you currently need... (If the patient hasn't done an activity recently, how much help from another person do you think he/she would need if he/she tried?)   Turning from your  back to your side while in a flat bed without using bedrails? 3   Moving from lying on your back to sitting on the side of a flat bed without using bedrails? 3   Moving to and from a bed to a chair (including a wheelchair)? 3   Standing up from a chair using your arms (e.g., wheelchair, or bedside chair)? 3   Walking in hospital room? 3   Climbing 3-5 steps with a railing? 2   6 clicks Mobility Score 17   Activity Tolerance   Sitting in Chair Post session   Patient / Family Goals    Patient / Family Goal #1 To return to prior level of functional mobility   Goal #1 Outcome Progressing as expected   Short Term Goals    Short Term Goal # 1 Patient will perform supine-sit, sit-supine with HOB flat without rails with supervision in 6 visits to safely get in & out of bed   Goal Outcome # 1 Progressing as expected   Short Term Goal # 2 Patient will perform sit-stand with LRAD with supervision in 6 visits to progress with functional mobility   Goal Outcome # 2 Progressing as expected   Short Term Goal # 3 Patient will perform chair transfers with LRAD with supervision in 6 visits to safely get OOB to chair   Goal Outcome # 3 Progressing as expected   Short Term Goal # 4 Patient will ambulate 100 feet with LRAD with supervision in 6 visits to safely ambulate household distance   Goal Outcome # 4 Progressing as expected   Short Term Goal # 5 Patient will negotiate 1 step threshold with LRAD with supervision in 6 visits to safely get in & out home   Goal Outcome # 5 Goal not met   Physical Therapy Treatment Plan   Physical Therapy Treatment Plan Continue Current Treatment Plan   Anticipated Discharge Equipment and Recommendations   DC Equipment Recommendations Unable to determine at this time   Discharge Recommendations Recommend post-acute placement for additional physical therapy services prior to discharge home   Interdisciplinary Plan of Care Collaboration   IDT Collaboration with  Nursing;Certified Nursing Assistant    Patient Position at End of Therapy Seated;Chair Alarm On;Call Light within Reach;Tray Table within Reach   Session Information   Date / Session Number  1/28-2(2/4, 2/2)

## 2025-01-29 NOTE — DISCHARGE PLANNING
Agency/Facility Name: [facility]  Plan or Request: ERWIN left vm for Lucero, asked for a call back.    Referral sent to [facility] and [facility]    1320- Spoke To: Paige Santizo [phone]  Agency/Facility Name: [facility] ([place])  Plan or Request: Will review referral soon    1325- Message from Virginie at [facility] with a few questions. ERIWN sent message with Lsw name and phone number to contact.

## 2025-01-29 NOTE — PROGRESS NOTES
Acute Neurology Progress Note  Neurohospitalist Service, [facility]sciences      Referring Physician: Gabriel Mckeon D.O.      Interval History: No acute events overnight.    Review of systems: In addition to what is detailed in the HPI and/or updated in the interval history, all other systems reviewed and are negative.    Past Medical History, Past Surgical History and Social History reviewed and unchanged from prior    Medications:    Current Facility-Administered Medications:     acetaminophen (Tylenol) tablet 650 mg, 650 mg, Oral, Q6HRS PRN, LUIS MichaelO., 650 mg at 01/28/25 0738    senna-docusate (Pericolace Or Senokot S) 8.6-50 MG per tablet 2 Tablet, 2 Tablet, Oral, Q EVENING, 2 Tablet at 01/28/25 1717 **AND** polyethylene glycol/lytes (Miralax) Packet 1 Packet, 1 Packet, Oral, QDAY PRN, LUIS MichaelO., 1 Packet at 01/27/25 1250    apixaban (Eliquis) tablet 5 mg, 5 mg, Oral, BID, Jorge Luis Jackson D.O., 5 mg at 01/29/25 0510    carvedilol (Coreg) tablet 6.25 mg, 6.25 mg, Oral, BID WITH MEALS, JAIDA Michael.O., 6.25 mg at 01/29/25 0749    sacubitril-valsartan (Entresto) 24-26 MG 1 Tablet, 1 Tablet, Oral, BID, JAIDA Michael.O., 1 Tablet at 01/29/25 0509    furosemide (Lasix) tablet 20 mg, 20 mg, Oral, DAILY, Jorge Luis Jackson D.O., 20 mg at 01/29/25 0509    LORazepam (Ativan) injection 1 mg, 1 mg, Intravenous, Once PRN, JOSE ANGEL Howard    Pharmacy Consult Request ...Pain Management Review 1 Each, 1 Each, Other, PHARMACY TO DOSE, SHI Howard.    oxyCODONE immediate-release (Roxicodone) tablet 5 mg, 5 mg, Oral, Q3HRS PRN **OR** oxyCODONE immediate release (Roxicodone) tablet 10 mg, 10 mg, Oral, Q3HRS PRN, 10 mg at 01/29/25 0510 **OR** morphine 4 MG/ML injection 4 mg, 4 mg, Intravenous, Q3HRS PRN, Jenae Mejia A.P.R.N.    gabapentin (Neurontin) capsule 600 mg, 600 mg, Oral, TID, Vishal Hummel A.P.R.N., 600 mg at 01/29/25  "0510    [COMPLETED] cyanocobalamin (Vitamin B-12) injection 1,000 mcg, 1,000 mcg, Intramuscular, DAILY, 1,000 mcg at 01/28/25 0419 **FOLLOWED BY** cyanocobalamin (Vitamin B-12) injection 1,000 mcg, 1,000 mcg, Intramuscular, Q7 DAYS, 1,000 mcg at 01/29/25 0516 **FOLLOWED BY** [START ON 2/26/2025] cyanocobalamin (Vitamin B-12) injection 1,000 mcg, 1,000 mcg, Intramuscular, Q30 DAYS, Vishal Hummel A.P.RTATE    Physical Examination:   /60   Pulse 64   Temp 36.6 °C (97.8 °F) (Temporal)   Resp 18   Ht 1.803 m (5' 11\") Comment: per 's license  Wt (!) 141 kg (311 lb 1.1 oz)   SpO2 96%   BMI 43.39 kg/m²       General: Patient is awake and in no acute distress  Neck: There is normal range of motion  CV: Regular rate   Extremities:  Warm, dry, and intact, without peripheral lower extremity edema    NEUROLOGICAL EXAM:     Mental status: Awake, alert and fully oriented, follows commands  Speech and language: Speech is clear and fluent. The patient is able to name and repeat.  Cranial nerve exam:    CN II-III: PERRLA   CN II: Visual Fields Intact   CN III, IV, VI: EOMI w/o Nystagmus   CN V: Facial sensation intact, full strength with mastication   CN VII: Symmetrical facial movement   CN VIII: Hearing grossly normal   CN IX, X: Palate elevates at midline   CN XI: Symmetric shoulder shrug   CN XII: Tongue midline    Motor exam: There is sustained antigravity with no downward drift in bilateral arms and legs.  Normal tone/bulk. No tremors or pronator drift   RUE 5/5   LUE 5/5   RLL 4/5   LLL 4/5     Reflexes:   Brachioradialus  R 2/ L 2   Biceps  R 2/ L 2   Triceps  R 2/ L 2   Patellar R 2/ L 2   Achilles R 2/ L 2  Unable to test babinski at this time due to pain    Sensory exam:  Reacts to tactile in all 4 extremities, increased pain in bilateral lower extremities, described as sharp tingling sensation starting from the bottom of this feet and moving up.   Coordination: No ataxia on bilateral FTN " testing      Objective Data:    Labs:  Lab Results   Component Value Date/Time    PROTHROMBTM 14.2 01/07/2025 03:06 AM    INR 1.10 01/07/2025 03:06 AM      Lab Results   Component Value Date/Time    WBC 6.8 01/29/2025 05:25 AM    RBC 4.15 (L) 01/29/2025 05:25 AM    HEMOGLOBIN 12.4 (L) 01/29/2025 05:25 AM    HEMATOCRIT 37.1 (L) 01/29/2025 05:25 AM    MCV 89.4 01/29/2025 05:25 AM    MCH 29.9 01/29/2025 05:25 AM    MCHC 33.4 01/29/2025 05:25 AM    MPV 8.8 (L) 01/29/2025 05:25 AM    NEUTSPOLYS 63.00 01/26/2025 06:43 AM    LYMPHOCYTES 23.40 01/26/2025 06:43 AM    MONOCYTES 9.70 01/26/2025 06:43 AM    EOSINOPHILS 3.10 01/26/2025 06:43 AM    BASOPHILS 0.50 01/26/2025 06:43 AM      Lab Results   Component Value Date/Time    SODIUM 133 (L) 01/29/2025 05:25 AM    POTASSIUM 4.1 01/29/2025 05:25 AM    CHLORIDE 99 01/29/2025 05:25 AM    CO2 25 01/29/2025 05:25 AM    GLUCOSE 97 01/29/2025 05:25 AM    BUN 24 (H) 01/29/2025 05:25 AM    CREATININE 1.24 01/29/2025 05:25 AM      Lab Results   Component Value Date/Time    CHOLSTRLTOT 139 01/06/2025 01:45 AM    LDL 84 01/06/2025 01:45 AM    HDL 41 01/06/2025 01:45 AM    TRIGLYCERIDE 70 01/06/2025 01:45 AM       Lab Results   Component Value Date/Time    ALKPHOSPHAT 75 01/27/2025 03:43 AM    ASTSGOT 16 01/27/2025 03:43 AM    ALTSGPT 19 01/27/2025 03:43 AM    TBILIRUBIN 0.5 01/27/2025 03:43 AM        Imaging/Testing:    I interpreted and/or reviewed the patient's neuroimaging    MR-LUMBAR SPINE-WITH & W/O   Final Result      1.  Transitional lumbosacral junction. Vestigial ribs at the T12 level. Partial sacralization of L5.   2.  L3-4 mild facet hypertrophy.   3.  L4-5 mild facet hypertrophy. Mild bilateral foraminal narrowing.   4.  L5-S1 mild right foraminal stenosis.   5.  Prominent epidural fat at the lower lumbar/lumbosacral levels. No central stenosis on the basis of disc disease or spondylotic change.      MR-THORACIC SPINE-WITH & W/O   Final Result      1.  Diffusely abnormal T2  "hyperintense central cord signal from at least C6-7 down to T10-T11. Questionable corresponding enhancement demonstrated just at the T6-T7 level. Diffuse cord involvement in this fashion would be unlikely to represent multiple    sclerosis. Longitudinal multilevel cord involvement in this fashion may represent infectious or autoimmune myelitis. Viral etiologies such as West Nile virus might have this appearance. Acute cord infarction involves the central gray matter, however, the    pattern of involvement does not show the characteristic \"H\" morphology. Diffuse thoracic cord edema based on venous congestion can be seen associated with dural AV fistula. (Foix-Alajouanine syndrome). Finally, neuromyelitis optica (NMO) can manifest as    multilevel longitudinal cord lesions, however, these are usually more segmented with lesions spanning 3 or 4 levels but uncommonly the entire cord in continuity.      MR-BRAIN-WITH & W/O    (Results Pending)   MR-CERVICAL SPINE-WITH & W/O    (Results Pending)      Latest Reference Range & Units 01/27/25 00:50   Amphetamines Urine Negative  Negative   Barbiturates Negative  Negative   Benzodiazepines Negative  Negative   Cannabinoid Metab Negative  Positive !   Cocaine Metabolite Negative  Positive !   Methadone Negative  Negative   Opiates Negative  Positive !   Oxycodone Negative  Positive !   Phencyclidine -Pcp Negative  Negative   Propoxyphene Negative  Negative   !: Data is abnormal     Latest Reference Range & Units 01/27/25 00:50   Fentanyl, Urine Negative  Positive !   !: Data is abnormal     Latest Reference Range & Units 01/26/25 12:04   Copper Serum 70.0 - 140.0 ug/dL 174.6 (H)   (H): Data is abnormally high     Latest Reference Range & Units 01/26/25 12:04   Vitamin B12 -True Cobalamin 211 - 911 pg/mL 156 (L)   (L): Data is abnormally low    Impression & Recommendations:      Oracio Jurado is a 44 y.o. presenting for whom neurology has been consulted for bilateral lower " extremity weakness and sensation changes. Patient has a history of polysubstance abuse, heart failure, who presents with one month lower extremity weakness, pins and needles pain in feet, and subjective tightness in abdomen and thighs. MRI T & L spine shows extensive non-enhancing T2 lesion in posterior column of thoracis spine C6-T11 and severe B12 deficiency on labs. B12 currently being replaced. On exam today, patient reports to no improvement however mild improved noted on bilateral lower extremity weakness. Patient has been able to ambulate with some assistance to the restroom. Reports to no improvement in his painful sensation to bilateral lower feet. Extended workup pending including MRI brain and C spine. Suspicion remains high for B12 deficiency in the setting of polysubstance abuse and huffing nitrous oxide.    Plan:  -MRI brain with and without- pending  -MRI C-spine-pending   -PT/OT  -Continue gabapentin 600 mg TID for neuropathy  -Continue replacement of B12 1000 mcg weekly x 4 weeks, then maintenance once monthly   -Follow pending serology   -MMA, AVERY, Vitamin E, AQP-4  -Neurology will follow     TODD Juares  Acute Neurology        The evaluation of the patient, and recommended management, was discussed with Dr. Velasco, the attending Acute Neurologist. I have performed a physical exam and reviewed and updated ROS and Plan today (1/29/2025).      Please note that this dictation was created using voice recognition software.  I have made every reasonable attempt to correct obvious errors, but I expect that there are errors of grammar and possibly content that I did not discover before finalizing the note.

## 2025-01-29 NOTE — CARE PLAN
The patient is Stable - Low risk of patient condition declining or worsening    Shift Goals  Clinical Goals: pain management, mobility  Patient Goals: pain management  Family Goals: maria teresa    Progress made toward(s) clinical / shift goals:  Patient medicated per MAR for pain. Patient states pain is feeling better today. Bed alarm on. Bed locked and low position. Patient remained Aox4 throughout shift. Patient educated on importance of turning in bed and not sitting in the chair for too long in order to prevent any pressure injury.     Problem: Pain - Standard  Goal: Alleviation of pain or a reduction in pain to the patient’s comfort goal  Outcome: Progressing     Problem: Fall Risk  Goal: Patient will remain free from falls  Outcome: Progressing     Problem: Neuro Status  Goal: Neuro status will remain stable or improve  Outcome: Progressing       Patient is not progressing towards the following goals:

## 2025-01-29 NOTE — DISCHARGE PLANNING
Care Transition Team Assessment    LMSW met with pt at bedside, pt able to verify information for face sheet.  Pt lives in a single level home, no steps to enter, with 2 minor children ages 13 and 10 years old. Pt reports has  support from family friends. Pt's PCP is Valerio Maldonado at [facility] in [facility].  No AD on file, pt would like AD booklet.  Pt uses wheelchair and walker for DME, no home oxygen.  Pt's friend/emergency contact Niraj provides pt with about 8 hours of caregiving from Monday through Friday.  Niraj assists with ADLS and IADLS including shopping, bathing, and cleaning.  Pt reports requires assistance with walking to toilet/bathroom.  Pt's older sister Cherise Gibson [phone] lives in [place].  Pt reports has great support from local friends and neighbors.  Pt has been unemployed for about 1 year.  Pt has no income.  Pt reports has savings that has been supporting pt.  Pt denies financial or food insecurities or need for resource information.  Pt denies prior SNF/IPR placement or HH services used in the past.  Pt denies SA or MH concerns.  Pt drives self at baseline.  Pt reports friend [place] is able to provide pt transportation once medically cleared.    Discussed post acute placement recommendations.   Pt is agreeable to SNF/IPR placement. Pt confirmed only payor is [employer].  Informed pt that SNF referrals have been sent to several [place] SNFS, currently no SNFs accepting.      Information Source  Orientation Level: Oriented X4  Information Given By: Patient  Who is responsible for making decisions for patient? : Patient    Readmission Evaluation  Is this a readmission?: No    Elopement Risk  Legal Hold: No  Ambulatory or Self Mobile in Wheelchair: No-Not an Elopement Risk  Elopement Risk: Not at Risk for Elopement    Interdisciplinary Discharge Planning  Lives with - Patient's Self Care Capacity: Child Less than 18 Years of Age  Patient or legal guardian wants to  designate a caregiver: Yes  Caregiver name: Cherise  Caregiver contact info: [phone]  ([facility]) Authorization for Release of Health Information has been completed: Yes  Housing / Facility: 1 Story House  Prior Services: Home-Independent, Intermittent Physical Support for ADL Per Family (assist from friends)    Discharge Preparedness  What is your plan after discharge?: Skilled nursing facility  What are your discharge supports?: Sibling, Other (comment) (Friends/Neighbors)  Prior Functional Level: Needs Assist with Activities of Daily Living  Difficulity with ADLs: Walking, Toileting, Bathing  Difficulity with IADLs: Laundry, Shopping    Functional Assesment  Prior Functional Level: Needs Assist with Activities of Daily Living    Finances  Financial Barriers to Discharge: No  Prescription Coverage: Yes    Vision / Hearing Impairment  Vision Impairment : No  Hearing Impairment : No    Advance Directive  Advance Directive?: None  Advance Directive offered?: AD Booklet given    Domestic Abuse  Possible Abuse/Neglect Reported to:: Not Applicable    Psychological Assessment  History of Substance Abuse: None  History of Psychiatric Problems: No  Non-compliant with Treatment: No  Newly Diagnosed Illness: No    Discharge Risks or Barriers  Discharge risks or barriers?: Uninsured / underinsured, Post-acute placement / services  Patient risk factors: Uninsured or underinsured, Readmission    Anticipated Discharge Information  Discharge Disposition: D/T to SNF with Medicare cert in anticipation of skilled care (03)  Discharge Address: [address] [place] [address]  Discharge Contact Phone Number: [phone]

## 2025-01-29 NOTE — CARE PLAN
Problem: Knowledge Deficit - Standard  Goal: Patient and family/care givers will demonstrate understanding of plan of care, disease process/condition, diagnostic tests and medications  Description: Target End Date:  1-3 days or as soon as patient condition allows    Document in Patient Education    1.  Patient and family/caregiver oriented to unit, equipment, visitation policy and means for communicating concern  2.  Complete/review Learning Assessment  3.  Assess knowledge level of disease process/condition, treatment plan, diagnostic tests and medications  4.  Explain disease process/condition, treatment plan, diagnostic tests and medications  Outcome: Progressing     Problem: Pain - Standard  Goal: Alleviation of pain or a reduction in pain to the patient’s comfort goal  Description: Target End Date:  Prior to discharge or change in level of care    Document on Vitals flowsheet    1.  Document pain using the appropriate pain scale per order or unit policy  2.  Educate and implement non-pharmacologic comfort measures (i.e. relaxation, distraction, massage, cold/heat therapy, etc.)  3.  Pain management medications as ordered  4.  Reassess pain after pain med administration per policy  5.  If opiods administered assess patient's response to pain medication is appropriate per POSS sedation scale  6.  Follow pain management plan developed in collaboration with patient and interdisciplinary team (including palliative care or pain specialists if applicable)  Outcome: Progressing     Problem: Fall Risk  Goal: Patient will remain free from falls  Description: Target End Date:  Prior to discharge or change in level of care    Document interventions on the Martinez Jasen Fall Risk Assessment    1.  Assess for fall risk factors  2.  Implement fall precautions  Outcome: Progressing     Problem: Neuro Status  Goal: Neuro status will remain stable or improve  Description: Target End Date:  Prior to discharge or change in level  of care    Document on Neuro assessment in the Assessment flowsheet    1.  Assess and monitor neurologic status per provider order/protocol/unit policy  2.  Assess level of consciousness and orientation  3.  Assess for speech, dysarthria, dysphagia, facial symmetry  4.  Assess visual field, eye movements, gaze preference, pupil reaction and size  5.  Assess muscle strength and motor response in all four extremities  6.  Assess for sensation (numbness and tingling)  7.  Assess basic neuro reflexes (cough, gag, corneal)  8.  Identify changes in neuro status and report to provider for testing/treatment orders  Outcome: Progressing     Problem: Skin Integrity  Goal: Skin integrity is maintained or improved  Description: Target End Date:  Prior to discharge or change in level of care    Document interventions on Skin Risk/Devon flowsheet groups and corresponding LDA    1.  Assess and monitor skin integrity, appearance and/or temperature  2.  Assess risk factors for impaired skin integrity and/or pressures ulcers  3.  Implement precautions to protect skin integrity in collaboration with interdisciplinary team  4.  Implement pressure ulcer prevention protocol if at risk for skin breakdown  5.  Confirm wound care consult if at risk for skin breakdown  6.  Ensure patient use of pressure relieving devices  (Low air loss bed, waffle overlay, heel protectors, ROHO cushion, etc)  Outcome: Progressing   The patient is Stable - Low risk of patient condition declining or worsening    Shift Goals  Clinical Goals: pain control, neuro checks, mobility  Patient Goals: pain control, rest  Family Goals: maria teresa    Progress made toward(s) clinical / shift goals:      Patient is not progressing towards the following goals:

## 2025-01-29 NOTE — CARE PLAN
The patient is Stable - Low risk of patient condition declining or worsening    Shift Goals  Clinical Goals: pain control, mobility, neuro checks  Patient Goals: pain control, rest  Family Goals: maria teresa    Progress made toward(s) clinical / shift goals:  Patient remained Aox4 throughout shift and states that his pain and numbness is improving. Bed alarm on. Patient up in chair for meals and waffle cushion placed underneath. Patient is able to ambulate short distances using FWW.     Problem: Pain - Standard  Goal: Alleviation of pain or a reduction in pain to the patient’s comfort goal  Outcome: Progressing     Problem: Fall Risk  Goal: Patient will remain free from falls  Outcome: Progressing     Problem: Neuro Status  Goal: Neuro status will remain stable or improve  Outcome: Progressing     Problem: Skin Integrity  Goal: Skin integrity is maintained or improved  Outcome: Progressing       Patient is not progressing towards the following goals:

## 2025-01-29 NOTE — PROGRESS NOTES
[facility] Medicine Daily Progress Note    Date of Service  1/29/2025    Chief Complaint  Oracio Jurado is a 44 y.o. male admitted 1/26/2025 with ascending weakness/numbness and tingling.    Hospital Course    44-year-old male with history of cocaine abuse, PE on Eliquis presented 1/9 with weakness and numbness on his lower extremities. Patient states his symptoms started about 1 month before the admission, however progressively worsened the past 2 weeks making it difficult for the patient to walk. States it started at his feet, then to his knees and to his epigastric area below his rib cage with associated numbness and tingling and sciatic pain that radiates down his bilateral lower extremities, worse on the left. Denies any bowel or bladder incontinence, denies any saddle anesthesia, and does feel some muscle spasms over his torso area. Denies any shortness of breath or trouble breathing.  Denied any fever or drug abuse.  No new medication starts, denies any sore throat, no bloody diarrhea in the past few months. Patient transferred to [facility] for neurologic evaluation and MRI.  MRI for lumbar spine did not show any stenosis however for thoracic spine showed abnormal enhancement would be unlikely to represent multiple sclerosis, possible infection versus autoimmune myelitis also acute cord infarction involving the central gray matter.  Neurologist evaluated the patient who recommended discontinue Solu-Medrol which was started on admission and neurology team feels the patient has peripheral neuropathy versus toxic neuropathy.  No need for LP and he did not feel patient has MS or GBS.  B12 was very low at and homocystine was high, B12 supplementation was started  PT and OT evaluated the patient recommended placement.        Interval Problem Update  -Evaluated examined the patient at bedside, patient states his pain and numbness improved than yesterday  -Continue B12 supplementation IM and gabapentin  -Discussed with  neurology team, patient will need more workup for neuropathy and MRI brain and C-spine was ordered.  -Labs reviewed around baseline, all images and labs were discussed in detail with the patient, answered all his questions    Patient was seen and examined at bedside.  I have personally reviewed and interpreted vitals, labs, and imaging.    1/28.  Afebrile.  Intermittent bradycardia.  On room air.  Denies fever, chills, chest pains.  States that when he puts his feet on the floor he gets shivering raising his feet all the way up to his torso so that he gets whole body shivers.  He does state strength is improving.  He was stabilized and sent today.  1/29.  Afebrile.  Intermittent bradycardia.  On room air.  Denies fever, chills, chest pains, shortness of breath.  Complains of constipation.  States the paresthesias in his legs is getting better.  Working to mobilize more.  Hgb 12.4  Na 133    I have discussed this patient's plan of care and discharge plan at IDT rounds today with Case Management, Nursing, Nursing leadership, and other members of the IDT team.    Consultants/Specialty  neurology    Code Status  Full Code    Disposition  The patient is not medically cleared for discharge to home or a post-acute facility.  Anticipate discharge to: skilled nursing facility    I have placed the appropriate orders for post-discharge needs.    Review of Systems  Review of Systems   Constitutional:  Negative for chills, fever and malaise/fatigue.   Respiratory:  Negative for cough and shortness of breath.    Cardiovascular:  Negative for chest pain, palpitations and leg swelling.   Gastrointestinal:  Negative for abdominal pain, diarrhea, heartburn, nausea and vomiting.   Genitourinary:  Negative for dysuria, frequency and urgency.   Neurological:  Positive for tingling, focal weakness and weakness. Negative for dizziness and headaches.   All other systems reviewed and are negative.       Physical Exam  Temp:  [36.1 °C (97  °F)-36.6 °C (97.8 °F)] 36.6 °C (97.8 °F)  Pulse:  [53-70] 60  Resp:  [16-20] 18  BP: ()/(60-78) 103/71  SpO2:  [94 %-98 %] 96 %    Physical Exam  Vitals and nursing note reviewed.   Constitutional:       General: He is not in acute distress.     Appearance: Normal appearance. He is not ill-appearing.   HENT:      Head: Normocephalic and atraumatic.      Jaw: There is normal jaw occlusion.      Right Ear: Hearing normal.      Left Ear: Hearing normal.      Nose: Nose normal.      Mouth/Throat:      Lips: Pink.      Mouth: Mucous membranes are moist.   Eyes:      Extraocular Movements: Extraocular movements intact.      Conjunctiva/sclera: Conjunctivae normal.      Pupils: Pupils are equal, round, and reactive to light.   Neck:      Vascular: No carotid bruit.   Cardiovascular:      Rate and Rhythm: Normal rate and regular rhythm.      Pulses: Normal pulses.      Heart sounds: Normal heart sounds, S1 normal and S2 normal.   Pulmonary:      Effort: Pulmonary effort is normal.      Breath sounds: Normal breath sounds and air entry. No stridor.   Musculoskeletal:      Cervical back: Normal range of motion and neck supple.      Right lower leg: No edema.      Left lower leg: No edema.   Skin:     General: Skin is warm and dry.      Capillary Refill: Capillary refill takes less than 2 seconds.   Neurological:      Mental Status: He is alert and oriented to person, place, and time.      Sensory: Sensory deficit present.      Motor: Weakness present.      Gait: Gait abnormal.   Psychiatric:         Attention and Perception: Attention and perception normal.         Mood and Affect: Mood and affect normal.         Speech: Speech normal.         Behavior: Behavior normal. Behavior is cooperative.         Fluids    Intake/Output Summary (Last 24 hours) at 1/29/2025 0721  Last data filed at 1/29/2025 0456  Gross per 24 hour   Intake 1200 ml   Output 2475 ml   Net -1275 ml        Laboratory  Recent Labs     01/27/25  0624  "01/29/25  0525   WBC 10.3 6.8   RBC 4.15* 4.15*   HEMOGLOBIN 12.3* 12.4*   HEMATOCRIT 37.1* 37.1*   MCV 89.4 89.4   MCH 29.6 29.9   MCHC 33.2 33.4   RDW 42.4 43.7   PLATELETCT 255 271   MPV 9.3 8.8*     Recent Labs     01/27/25  0343 01/29/25  0525   SODIUM 135 133*   POTASSIUM 4.8 4.1   CHLORIDE 102 99   CO2 22 25   GLUCOSE 187* 97   BUN 24* 24*   CREATININE 1.38 1.24   CALCIUM 9.8 9.4                   Imaging  MR-LUMBAR SPINE-WITH & W/O   Final Result      1.  Transitional lumbosacral junction. Vestigial ribs at the T12 level. Partial sacralization of L5.   2.  L3-4 mild facet hypertrophy.   3.  L4-5 mild facet hypertrophy. Mild bilateral foraminal narrowing.   4.  L5-S1 mild right foraminal stenosis.   5.  Prominent epidural fat at the lower lumbar/lumbosacral levels. No central stenosis on the basis of disc disease or spondylotic change.      MR-THORACIC SPINE-WITH & W/O   Final Result      1.  Diffusely abnormal T2 hyperintense central cord signal from at least C6-7 down to T10-T11. Questionable corresponding enhancement demonstrated just at the T6-T7 level. Diffuse cord involvement in this fashion would be unlikely to represent multiple    sclerosis. Longitudinal multilevel cord involvement in this fashion may represent infectious or autoimmune myelitis. Viral etiologies such as West Nile virus might have this appearance. Acute cord infarction involves the central gray matter, however, the    pattern of involvement does not show the characteristic \"H\" morphology. Diffuse thoracic cord edema based on venous congestion can be seen associated with dural AV fistula. (Foix-Alajouanine syndrome). Finally, neuromyelitis optica (NMO) can manifest as    multilevel longitudinal cord lesions, however, these are usually more segmented with lesions spanning 3 or 4 levels but uncommonly the entire cord in continuity.      MR-BRAIN-WITH & W/O    (Results Pending)   MR-CERVICAL SPINE-WITH & W/O    (Results Pending)    "     Assessment/Plan  * Neuropathy  Assessment & Plan  1/29/2025  Continue gabapentin  B12 is very low, started supplementation  Workup labs are pending  Neurology on board, appreciate recommendations  Consider Lyrica or Cymbalta if needed  Improving    B12 deficiency  Assessment & Plan  1/29/2025  With the neuropathy and elevated homocystine  Start with IM    Bilateral leg weakness- (present on admission)  Assessment & Plan  1/29/2025  For couple months before admission with tingling  MRI showed abnormal sinus with multiple differential diagnosis however neurologist feels the patient has neuropathy due to B12 deficiency and did not recommend any LP or other MRI and no need for steroid  PT and OT and PMR    Cocaine abuse (HCC)- (present on admission)  Assessment & Plan  1/29/2025  Urine drug screen was positive for marijuana and cocaine  Encouraged the patient to quit    Bilateral pulmonary embolism (HCC)- (present on admission)  Assessment & Plan  1/29/2025  Present on last admission, discharged 1/9/2025 found to have bilateral pulmonary emboli   Continue Eliquis         VTE prophylaxis: Apixaban    I have performed a physical exam and reviewed and updated ROS and Plan today (1/29/2025). In review of yesterday's note (1/28/2025), there are no changes except as documented above.    Greater than 51 minutes spent prepping to see patient (e.g. review of tests) obtaining and/or reviewing separately obtained history. Performing a medically appropriate examination and/ evaluation.  Counseling and educating the patient/family/caregiver.  Ordering medications, tests, or procedures.  Referring and communicating with other health care professionals.  Documenting clinical information in EPIC.  Independently interpreting results and communicating results to patient/family/caregiver.  Care coordination.

## 2025-01-30 ENCOUNTER — APPOINTMENT (OUTPATIENT)
Dept: RADIOLOGY | Facility: MEDICAL CENTER | Age: 45
DRG: 073 | End: 2025-01-30
Payer: COMMERCIAL

## 2025-01-30 LAB
ALBUMIN SERPL BCP-MCNC: 3.7 G/DL (ref 3.2–4.9)
AQP4 H2O CHANNEL IGG SERPL QL IF: NORMAL
BUN SERPL-MCNC: 18 MG/DL (ref 8–22)
CALCIUM ALBUM COR SERPL-MCNC: 8.9 MG/DL (ref 8.5–10.5)
CALCIUM SERPL-MCNC: 8.7 MG/DL (ref 8.5–10.5)
CHLORIDE SERPL-SCNC: 104 MMOL/L (ref 96–112)
CO2 SERPL-SCNC: 23 MMOL/L (ref 20–33)
CREAT SERPL-MCNC: 1.12 MG/DL (ref 0.5–1.4)
ERYTHROCYTE [DISTWIDTH] IN BLOOD BY AUTOMATED COUNT: 43.5 FL (ref 35.9–50)
GFR SERPLBLD CREATININE-BSD FMLA CKD-EPI: 82 ML/MIN/1.73 M 2
GLUCOSE SERPL-MCNC: 100 MG/DL (ref 65–99)
HCT VFR BLD AUTO: 36.4 % (ref 42–52)
HGB BLD-MCNC: 12.4 G/DL (ref 14–18)
MAGNESIUM SERPL-MCNC: 2.1 MG/DL (ref 1.5–2.5)
MCH RBC QN AUTO: 30.2 PG (ref 27–33)
MCHC RBC AUTO-ENTMCNC: 34.1 G/DL (ref 32.3–36.5)
MCV RBC AUTO: 88.6 FL (ref 81.4–97.8)
PHOSPHATE SERPL-MCNC: 3 MG/DL (ref 2.5–4.5)
PLATELET # BLD AUTO: 255 K/UL (ref 164–446)
PMV BLD AUTO: 9.3 FL (ref 9–12.9)
POTASSIUM SERPL-SCNC: 4.5 MMOL/L (ref 3.6–5.5)
RBC # BLD AUTO: 4.11 M/UL (ref 4.7–6.1)
SODIUM SERPL-SCNC: 137 MMOL/L (ref 135–145)
WBC # BLD AUTO: 6 K/UL (ref 4.8–10.8)

## 2025-01-30 PROCEDURE — 80069 RENAL FUNCTION PANEL: CPT

## 2025-01-30 PROCEDURE — 700102 HCHG RX REV CODE 250 W/ 637 OVERRIDE(OP)

## 2025-01-30 PROCEDURE — A9270 NON-COVERED ITEM OR SERVICE: HCPCS

## 2025-01-30 PROCEDURE — 99232 SBSQ HOSP IP/OBS MODERATE 35: CPT

## 2025-01-30 PROCEDURE — 36415 COLL VENOUS BLD VENIPUNCTURE: CPT

## 2025-01-30 PROCEDURE — 85027 COMPLETE CBC AUTOMATED: CPT

## 2025-01-30 PROCEDURE — 770006 HCHG ROOM/CARE - MED/SURG/GYN SEMI*

## 2025-01-30 PROCEDURE — 700102 HCHG RX REV CODE 250 W/ 637 OVERRIDE(OP): Performed by: INTERNAL MEDICINE

## 2025-01-30 PROCEDURE — 83735 ASSAY OF MAGNESIUM: CPT

## 2025-01-30 PROCEDURE — 99233 SBSQ HOSP IP/OBS HIGH 50: CPT | Performed by: INTERNAL MEDICINE

## 2025-01-30 PROCEDURE — A9270 NON-COVERED ITEM OR SERVICE: HCPCS | Performed by: INTERNAL MEDICINE

## 2025-01-30 RX ORDER — LORAZEPAM 2 MG/ML
2 INJECTION INTRAMUSCULAR
Status: COMPLETED | OUTPATIENT
Start: 2025-01-30 | End: 2025-01-31

## 2025-01-30 RX ADMIN — APIXABAN 5 MG: 5 TABLET, FILM COATED ORAL at 16:46

## 2025-01-30 RX ADMIN — OXYCODONE HYDROCHLORIDE 10 MG: 10 TABLET ORAL at 05:08

## 2025-01-30 RX ADMIN — GABAPENTIN 600 MG: 300 CAPSULE ORAL at 16:46

## 2025-01-30 RX ADMIN — SACUBITRIL AND VALSARTAN 1 TABLET: 24; 26 TABLET, FILM COATED ORAL at 05:04

## 2025-01-30 RX ADMIN — OXYCODONE HYDROCHLORIDE 10 MG: 10 TABLET ORAL at 20:54

## 2025-01-30 RX ADMIN — CARVEDILOL 6.25 MG: 6.25 TABLET, FILM COATED ORAL at 09:21

## 2025-01-30 RX ADMIN — CARVEDILOL 6.25 MG: 6.25 TABLET, FILM COATED ORAL at 16:46

## 2025-01-30 RX ADMIN — SACUBITRIL AND VALSARTAN 1 TABLET: 24; 26 TABLET, FILM COATED ORAL at 16:46

## 2025-01-30 RX ADMIN — SENNOSIDES AND DOCUSATE SODIUM 2 TABLET: 50; 8.6 TABLET ORAL at 16:45

## 2025-01-30 RX ADMIN — OXYCODONE 5 MG: 5 TABLET ORAL at 16:45

## 2025-01-30 RX ADMIN — GABAPENTIN 600 MG: 300 CAPSULE ORAL at 05:04

## 2025-01-30 RX ADMIN — GABAPENTIN 600 MG: 300 CAPSULE ORAL at 12:02

## 2025-01-30 RX ADMIN — APIXABAN 5 MG: 5 TABLET, FILM COATED ORAL at 05:05

## 2025-01-30 RX ADMIN — FUROSEMIDE 20 MG: 20 TABLET ORAL at 05:04

## 2025-01-30 ASSESSMENT — ENCOUNTER SYMPTOMS
TINGLING: 1
NAUSEA: 0
FOCAL WEAKNESS: 1
HEARTBURN: 0
ABDOMINAL PAIN: 0
SHORTNESS OF BREATH: 0
VOMITING: 0
COUGH: 0
WEAKNESS: 1
FEVER: 0
PALPITATIONS: 0
DIARRHEA: 0
CHILLS: 0
HEADACHES: 0
DIZZINESS: 0

## 2025-01-30 ASSESSMENT — PAIN DESCRIPTION - PAIN TYPE
TYPE: ACUTE PAIN

## 2025-01-30 NOTE — PROGRESS NOTES
Acute Neurology Progress Note  Neurohospitalist Service, [facility]      Referring Physician: Gabriel Mckeon D.O.      Interval History: No acute events overnight.    Review of systems: In addition to what is detailed in the HPI and/or updated in the interval history, all other systems reviewed and are negative.    Past Medical History, Past Surgical History and Social History reviewed and unchanged from prior    Medications:    Current Facility-Administered Medications:     senna-docusate (Pericolace Or Senokot S) 8.6-50 MG per tablet 2 Tablet, 2 Tablet, Oral, BID **AND** polyethylene glycol/lytes (Miralax) Packet 1 Packet, 1 Packet, Oral, DAILY, Gabriel Mckeon D.O., 1 Packet at 01/29/25 1446    bisacodyl (Dulcolax) suppository 10 mg, 10 mg, Rectal, QDAY PRN, Gabriel Mckeon D.O.    acetaminophen (Tylenol) tablet 650 mg, 650 mg, Oral, Q6HRS PRN, LUIS MichaelOSharif, 650 mg at 01/28/25 0738    apixaban (Eliquis) tablet 5 mg, 5 mg, Oral, BID, LUIS MichaelO., 5 mg at 01/30/25 0505    carvedilol (Coreg) tablet 6.25 mg, 6.25 mg, Oral, BID WITH MEALS, LUIS MichaelO., 6.25 mg at 01/29/25 1753    sacubitril-valsartan (Entresto) 24-26 MG 1 Tablet, 1 Tablet, Oral, BID, JAIDA Michael.O., 1 Tablet at 01/30/25 0504    furosemide (Lasix) tablet 20 mg, 20 mg, Oral, DAILY, LUIS MichaelO., 20 mg at 01/30/25 0504    LORazepam (Ativan) injection 1 mg, 1 mg, Intravenous, Once PRN, JOSE ANGEL Howard    Pharmacy Consult Request ...Pain Management Review 1 Each, 1 Each, Other, PHARMACY TO DOSE, JOSE ANGEL Howard    oxyCODONE immediate-release (Roxicodone) tablet 5 mg, 5 mg, Oral, Q3HRS PRN, 5 mg at 01/29/25 2034 **OR** oxyCODONE immediate release (Roxicodone) tablet 10 mg, 10 mg, Oral, Q3HRS PRN, 10 mg at 01/30/25 0508 **OR** morphine 4 MG/ML injection 4 mg, 4 mg, Intravenous, Q3HRS PRN, Jenae Mejia A.P.R.N.    gabapentin (Neurontin) capsule  "600 mg, 600 mg, Oral, TID, Vishal Hummel, A.P.R.N., 600 mg at 01/30/25 0504    [COMPLETED] cyanocobalamin (Vitamin B-12) injection 1,000 mcg, 1,000 mcg, Intramuscular, DAILY, 1,000 mcg at 01/28/25 0419 **FOLLOWED BY** cyanocobalamin (Vitamin B-12) injection 1,000 mcg, 1,000 mcg, Intramuscular, Q7 DAYS, 1,000 mcg at 01/29/25 0516 **FOLLOWED BY** [START ON 2/26/2025] cyanocobalamin (Vitamin B-12) injection 1,000 mcg, 1,000 mcg, Intramuscular, Q30 DAYS, ANDRAE Newman.P.R.N.    Physical Examination:   /70   Pulse (!) 58   Temp 36.4 °C (97.5 °F) (Temporal)   Resp 18   Ht 1.803 m (5' 11\") Comment: per 's license  Wt (!) 141 kg (311 lb 1.1 oz)   SpO2 95%   BMI 43.39 kg/m²       General: Patient is awake and in no acute distress  Neck: There is normal range of motion  CV: Regular rate   Extremities:  Warm, dry, and intact, without peripheral lower extremity edema    NEUROLOGICAL EXAM:     Mental status: Awake, alert and fully oriented, follows commands  Speech and language: Speech is clear and fluent. The patient is able to name and repeat.  Cranial nerve exam:    CN II-III: PERRLA   CN II: Visual Fields Intact   CN III, IV, VI: EOMI w/o Nystagmus   CN V: Facial sensation intact, full strength with mastication   CN VII: Symmetrical facial movement   CN VIII: Hearing grossly normal   CN IX, X: Palate elevates at midline   CN XI: Symmetric shoulder shrug   CN XII: Tongue midline    Motor exam: There is sustained antigravity with no downward drift in bilateral arms and legs.  Normal tone/bulk. No tremors or pronator drift   RUE 5/5   LUE 5/5   RLL 4/5   LLL 4/5     Reflexes:   Brachioradialus  R 2/ L 2   Biceps  R 2/ L 2   Triceps  R 2/ L 2   Patellar R 2/ L 2   Achilles R 2/ L 2  Unable to test babinski at this time due to pain    Sensory exam:  Reacts to tactile in all 4 extremities, increased pain in bilateral lower extremities, described as sharp tingling sensation starting from the bottom of " this feet and moving up.   Coordination: No ataxia on bilateral FTN testing      Objective Data:    Labs:  Lab Results   Component Value Date/Time    PROTHROMBTM 14.2 01/07/2025 03:06 AM    INR 1.10 01/07/2025 03:06 AM      Lab Results   Component Value Date/Time    WBC 6.0 01/30/2025 06:35 AM    RBC 4.11 (L) 01/30/2025 06:35 AM    HEMOGLOBIN 12.4 (L) 01/30/2025 06:35 AM    HEMATOCRIT 36.4 (L) 01/30/2025 06:35 AM    MCV 88.6 01/30/2025 06:35 AM    MCH 30.2 01/30/2025 06:35 AM    MCHC 34.1 01/30/2025 06:35 AM    MPV 9.3 01/30/2025 06:35 AM    NEUTSPOLYS 63.00 01/26/2025 06:43 AM    LYMPHOCYTES 23.40 01/26/2025 06:43 AM    MONOCYTES 9.70 01/26/2025 06:43 AM    EOSINOPHILS 3.10 01/26/2025 06:43 AM    BASOPHILS 0.50 01/26/2025 06:43 AM      Lab Results   Component Value Date/Time    SODIUM 137 01/30/2025 06:35 AM    POTASSIUM 4.5 01/30/2025 06:35 AM    CHLORIDE 104 01/30/2025 06:35 AM    CO2 23 01/30/2025 06:35 AM    GLUCOSE 100 (H) 01/30/2025 06:35 AM    BUN 18 01/30/2025 06:35 AM    CREATININE 1.12 01/30/2025 06:35 AM      Lab Results   Component Value Date/Time    CHOLSTRLTOT 139 01/06/2025 01:45 AM    LDL 84 01/06/2025 01:45 AM    HDL 41 01/06/2025 01:45 AM    TRIGLYCERIDE 70 01/06/2025 01:45 AM       Lab Results   Component Value Date/Time    ALKPHOSPHAT 75 01/27/2025 03:43 AM    ASTSGOT 16 01/27/2025 03:43 AM    ALTSGPT 19 01/27/2025 03:43 AM    TBILIRUBIN 0.5 01/27/2025 03:43 AM        Imaging/Testing:    I interpreted and/or reviewed the patient's neuroimaging    MR-LUMBAR SPINE-WITH & W/O   Final Result      1.  Transitional lumbosacral junction. Vestigial ribs at the T12 level. Partial sacralization of L5.   2.  L3-4 mild facet hypertrophy.   3.  L4-5 mild facet hypertrophy. Mild bilateral foraminal narrowing.   4.  L5-S1 mild right foraminal stenosis.   5.  Prominent epidural fat at the lower lumbar/lumbosacral levels. No central stenosis on the basis of disc disease or spondylotic change.      MR-THORACIC  "SPINE-WITH & W/O   Final Result      1.  Diffusely abnormal T2 hyperintense central cord signal from at least C6-7 down to T10-T11. Questionable corresponding enhancement demonstrated just at the T6-T7 level. Diffuse cord involvement in this fashion would be unlikely to represent multiple    sclerosis. Longitudinal multilevel cord involvement in this fashion may represent infectious or autoimmune myelitis. Viral etiologies such as West Nile virus might have this appearance. Acute cord infarction involves the central gray matter, however, the    pattern of involvement does not show the characteristic \"H\" morphology. Diffuse thoracic cord edema based on venous congestion can be seen associated with dural AV fistula. (Foix-Alajouanine syndrome). Finally, neuromyelitis optica (NMO) can manifest as    multilevel longitudinal cord lesions, however, these are usually more segmented with lesions spanning 3 or 4 levels but uncommonly the entire cord in continuity.      MR-BRAIN-WITH & W/O    (Results Pending)   MR-CERVICAL SPINE-WITH & W/O    (Results Pending)      Latest Reference Range & Units 01/27/25 00:50   Amphetamines Urine Negative  Negative   Barbiturates Negative  Negative   Benzodiazepines Negative  Negative   Cannabinoid Metab Negative  Positive !   Cocaine Metabolite Negative  Positive !   Methadone Negative  Negative   Opiates Negative  Positive !   Oxycodone Negative  Positive !   Phencyclidine -Pcp Negative  Negative   Propoxyphene Negative  Negative   !: Data is abnormal     Latest Reference Range & Units 01/27/25 00:50   Fentanyl, Urine Negative  Positive !   !: Data is abnormal     Latest Reference Range & Units 01/26/25 12:04   Copper Serum 70.0 - 140.0 ug/dL 174.6 (H)   (H): Data is abnormally high     Latest Reference Range & Units 01/26/25 12:04   Vitamin B12 -True Cobalamin 211 - 911 pg/mL 156 (L)   (L): Data is abnormally low    01/29/25 1426  VITAMIN E  Collected: 01/26/25 1204  Final result  " Specimen: Blood    Alpha-Tocopherol (Vit E) 5.3 Low  mg/L  Gamma-Tocopherol (Vit E) 1.0 mg/L           01/29/25 1420  METHYLMALONIC ACID, SERUM  Collected: 01/26/25 1204  Final result    Methylmalonic Acid, Serum 14.35 High  umol/L          Latest Reference Range & Units 01/26/25 12:04   Copper Serum 70.0 - 140.0 ug/dL 174.6 (H)   (H): Data is abnormally high      Impression & Recommendations:      No acute change overnight. Patient has a history of polysubstance abuse, heart failure, who presents with one month lower extremity weakness, pins and needles pain in feet, and subjective tightness in abdomen and thighs. MRI T & L spine shows extensive non-enhancing T2 lesion in posterior column of thoracis spine C6-T11 and severe B12 deficiency on labs. B12 currently being replaced, follow titration schedule ordered. On exam, patient reports to no improvement however mild improved noted on bilateral lower extremity weakness. Patient has been able to ambulate with some assistance to the restroom. Reports to no improvement in his painful sensation to bilateral lower feet. Extended workup pending including MRI brain and C spine. Kevin results at 174.6 therefore, copper deficiency no longer on the differential. Methylmalonic acid resulted high at 14.35, consistent with B12 deficiency. Suspicion remains high for B12 deficiency in the setting of polysubstance abuse and huffing nitrous oxide.    Plan:  -MRI brain with and without- pending  -MRI C-spine-pending   -PT/OT  -Continue gabapentin 600 mg TID for neuropathy  -Continue replacement of B12 1000 mcg weekly x 4 weeks, then maintenance once monthly   -Follow pending serology   -AQP-4  -Neurology will follow     TODD Juares  Acute Neurology        The evaluation of the patient, and recommended management, was discussed with Dr. Velasco, the attending Acute Neurologist. I have performed a physical exam and reviewed and updated ROS and Plan today (1/30/2025).       Please note that this dictation was created using voice recognition software.  I have made every reasonable attempt to correct obvious errors, but I expect that there are errors of grammar and possibly content that I did not discover before finalizing the note.

## 2025-01-30 NOTE — CARE PLAN
The patient is Stable - Low risk of patient condition declining or worsening    Shift Goals  Clinical Goals: pain management, MRI, neuro checks\  Patient Goals: pain control  Family Goals: maria teresa    Progress made toward(s) clinical / shift goals:  Patient educated on plan of care. Patient medicated per MAR for pain. Patient states his pain is improving. Q4 neuro checks. Patient remained Aox4 throughout shift and has no new complaints of numbness or tingling worsening. MRI attempted and patient has a panic attack before it was finished. Will reattempt MRI at a later time and medicate for anxiety.     Problem: Knowledge Deficit - Standard  Goal: Patient and family/care givers will demonstrate understanding of plan of care, disease process/condition, diagnostic tests and medications  Outcome: Progressing     Problem: Pain - Standard  Goal: Alleviation of pain or a reduction in pain to the patient’s comfort goal  Outcome: Progressing     Problem: Neuro Status  Goal: Neuro status will remain stable or improve  Outcome: Progressing       Patient is not progressing towards the following goals:

## 2025-01-30 NOTE — PROGRESS NOTES
4 Eyes Skin Assessment Completed by RUFUS Son and RUFUS Lindsay.    Head WDL  Ears WDL  Nose WDL  Mouth WDL  Neck WDL  Breast/Chest WDL  Shoulder Blades WDL  Spine WDL  (R) Arm/Elbow/Hand Scar  (L) Arm/Elbow/Hand WDL  Abdomen WDL  Groin WDL  Scrotum/Coccyx/Buttocks WDL  (R) Leg Scar  (L) Leg Scar  (R) Heel/Foot/Toe Swelling, dry/calloused heels  (L) Heel/Foot/Toe Swelling, dry/calloused heels           Devices In Places N/A      Interventions In Place Pillows and Pressure Redistribution Mattress    Possible Skin Injury No    Pictures Uploaded Into Epic N/A  Wound Consult Placed N/A  RN Wound Prevention Protocol Ordered No

## 2025-01-30 NOTE — CARE PLAN
The patient is Stable - Low risk of patient condition declining or worsening    Shift Goals  Clinical Goals: Pain management, monitor neuro status  Patient Goals: Pain control  Family Goals: maria teresa    Progress made toward(s) clinical / shift goals:      Problem: Pain - Standard  Goal: Alleviation of pain or a reduction in pain to the patient’s comfort goal  Outcome: Progressing   Frequent pain assessments throughout shift. PRN pain medications provided per MAR and pt request. Pharmacological and non-pharmacological interventions utilized.      Problem: Neuro Status  Goal: Neuro status will remain stable or improve  Outcome: Progressing    Q4H neuro checks in place. Pt's neurological status (A/Ox4) remains unchanged throughout shift. Bed alarm is on, call light within reach.     Patient is not progressing towards the following goals:

## 2025-01-30 NOTE — DISCHARGE PLANNING
Spoke with Virginie [phone] regarding [facility] swing bed.  Discussed pt's discharge plan/support with caregiving from friend Monday through Friday for about 8 hours and supplemental support from friends and neighbors.  Virginie confirmed will review pt's chart and discuss pt with team.  Direct line provided to Virginie for call back with decision.

## 2025-01-30 NOTE — DISCHARGE PLANNING
DPA called [facility] [phone] Left a VM for Felipa regarding SNF referral  Called [facility] [phone] left a VM for Admissions Paige/Nader WYATT  [phone]  DPA received call from Paige at [facility] stating they need PT OT notes as well as MRI of Head results and any current labs faxed to [phone]/Advised CM  [phone]  DPA spoke with EDMUNDO will wait for newest MRI results before sending to [facility]

## 2025-01-30 NOTE — PROGRESS NOTES
[facility] Medicine Daily Progress Note    Date of Service  1/30/2025    Chief Complaint  Oracio Jurado is a 44 y.o. male admitted 1/26/2025 with ascending weakness/numbness and tingling.    Hospital Course    44-year-old male with history of cocaine abuse, PE on Eliquis presented 1/9 with weakness and numbness on his lower extremities. Patient states his symptoms started about 1 month before the admission, however progressively worsened the past 2 weeks making it difficult for the patient to walk. States it started at his feet, then to his knees and to his epigastric area below his rib cage with associated numbness and tingling and sciatic pain that radiates down his bilateral lower extremities, worse on the left. Denies any bowel or bladder incontinence, denies any saddle anesthesia, and does feel some muscle spasms over his torso area. Denies any shortness of breath or trouble breathing.  Denied any fever or drug abuse.  No new medication starts, denies any sore throat, no bloody diarrhea in the past few months. Patient transferred to [facility] for neurologic evaluation and MRI.  MRI for lumbar spine did not show any stenosis however for thoracic spine showed abnormal enhancement would be unlikely to represent multiple sclerosis, possible infection versus autoimmune myelitis also acute cord infarction involving the central gray matter.  Neurologist evaluated the patient who recommended discontinue Solu-Medrol which was started on admission and neurology team feels the patient has peripheral neuropathy versus toxic neuropathy.  No need for LP and he did not feel patient has MS or GBS.  B12 was very low at and homocystine was high, B12 supplementation was started  PT and OT evaluated the patient recommended placement.        Interval Problem Update  -Evaluated examined the patient at bedside, patient states his pain and numbness improved than yesterday  -Continue B12 supplementation IM and gabapentin  -Discussed with  neurology team, patient will need more workup for neuropathy and MRI brain and C-spine was ordered.  -Labs reviewed around baseline, all images and labs were discussed in detail with the patient, answered all his questions    Patient was seen and examined at bedside.  I have personally reviewed and interpreted vitals, labs, and imaging.    1/28.  Afebrile.  Intermittent bradycardia.  On room air.  Denies fever, chills, chest pains.  States that when he puts his feet on the floor he gets shivering raising his feet all the way up to his torso so that he gets whole body shivers.  He does state strength is improving.  He was stabilized and sent today.  1/29.  Afebrile.  Intermittent bradycardia.  On room air.  Denies fever, chills, chest pains, shortness of breath.  Complains of constipation.  States the paresthesias in his legs is getting better.  Working to mobilize more.  1/30.  Afebrile.  Stable vitals.  On room air.  Denies fevers, chills, chest pains, shortness of breath.  Having persistent lower extremity paresthesias.  Cannot tolerate MRI brain and cervical this morning.  Will try again this afternoon with Ativan.  Hgb 12.4  Na 133 > 137    I have discussed this patient's plan of care and discharge plan at IDT rounds today with Case Management, Nursing, Nursing leadership, and other members of the IDT team.    Consultants/Specialty  neurology    Code Status  Full Code    Disposition  The patient is not medically cleared for discharge to home or a post-acute facility.  Anticipate discharge to: skilled nursing facility    I have placed the appropriate orders for post-discharge needs.    Review of Systems  Review of Systems   Constitutional:  Negative for chills, fever and malaise/fatigue.   Respiratory:  Negative for cough and shortness of breath.    Cardiovascular:  Negative for chest pain, palpitations and leg swelling.   Gastrointestinal:  Negative for abdominal pain, diarrhea, heartburn, nausea and vomiting.    Genitourinary:  Negative for dysuria, frequency and urgency.   Neurological:  Positive for tingling, focal weakness and weakness. Negative for dizziness and headaches.   All other systems reviewed and are negative.       Physical Exam  Temp:  [36.3 °C (97.3 °F)-36.6 °C (97.9 °F)] 36.3 °C (97.3 °F)  Pulse:  [63-86] 64  Resp:  [16-18] 18  BP: (105-130)/(60-78) 130/69  SpO2:  [92 %-95 %] 95 %    Physical Exam  Vitals and nursing note reviewed.   Constitutional:       General: He is not in acute distress.     Appearance: Normal appearance. He is not ill-appearing.   HENT:      Head: Normocephalic and atraumatic.      Jaw: There is normal jaw occlusion.      Right Ear: Hearing normal.      Left Ear: Hearing normal.      Nose: Nose normal.      Mouth/Throat:      Lips: Pink.      Mouth: Mucous membranes are moist.   Eyes:      Extraocular Movements: Extraocular movements intact.      Conjunctiva/sclera: Conjunctivae normal.      Pupils: Pupils are equal, round, and reactive to light.   Neck:      Vascular: No carotid bruit.   Cardiovascular:      Rate and Rhythm: Normal rate and regular rhythm.      Pulses: Normal pulses.      Heart sounds: Normal heart sounds, S1 normal and S2 normal.   Pulmonary:      Effort: Pulmonary effort is normal.      Breath sounds: Normal breath sounds and air entry. No stridor.   Musculoskeletal:      Cervical back: Normal range of motion and neck supple.      Right lower leg: No edema.      Left lower leg: No edema.   Skin:     General: Skin is warm and dry.      Capillary Refill: Capillary refill takes less than 2 seconds.   Neurological:      Mental Status: He is alert and oriented to person, place, and time.      Sensory: Sensory deficit present.      Motor: Weakness present.      Gait: Gait abnormal.   Psychiatric:         Attention and Perception: Attention and perception normal.         Mood and Affect: Mood and affect normal.         Speech: Speech normal.         Behavior: Behavior  "normal. Behavior is cooperative.         Fluids    Intake/Output Summary (Last 24 hours) at 1/30/2025 0621  Last data filed at 1/30/2025 0500  Gross per 24 hour   Intake 572 ml   Output 3200 ml   Net -2628 ml        Laboratory  Recent Labs     01/29/25  0525   WBC 6.8   RBC 4.15*   HEMOGLOBIN 12.4*   HEMATOCRIT 37.1*   MCV 89.4   MCH 29.9   MCHC 33.4   RDW 43.7   PLATELETCT 271   MPV 8.8*     Recent Labs     01/29/25  0525   SODIUM 133*   POTASSIUM 4.1   CHLORIDE 99   CO2 25   GLUCOSE 97   BUN 24*   CREATININE 1.24   CALCIUM 9.4                   Imaging  MR-LUMBAR SPINE-WITH & W/O   Final Result      1.  Transitional lumbosacral junction. Vestigial ribs at the T12 level. Partial sacralization of L5.   2.  L3-4 mild facet hypertrophy.   3.  L4-5 mild facet hypertrophy. Mild bilateral foraminal narrowing.   4.  L5-S1 mild right foraminal stenosis.   5.  Prominent epidural fat at the lower lumbar/lumbosacral levels. No central stenosis on the basis of disc disease or spondylotic change.      MR-THORACIC SPINE-WITH & W/O   Final Result      1.  Diffusely abnormal T2 hyperintense central cord signal from at least C6-7 down to T10-T11. Questionable corresponding enhancement demonstrated just at the T6-T7 level. Diffuse cord involvement in this fashion would be unlikely to represent multiple    sclerosis. Longitudinal multilevel cord involvement in this fashion may represent infectious or autoimmune myelitis. Viral etiologies such as West Nile virus might have this appearance. Acute cord infarction involves the central gray matter, however, the    pattern of involvement does not show the characteristic \"H\" morphology. Diffuse thoracic cord edema based on venous congestion can be seen associated with dural AV fistula. (Foix-Alajouanine syndrome). Finally, neuromyelitis optica (NMO) can manifest as    multilevel longitudinal cord lesions, however, these are usually more segmented with lesions spanning 3 or 4 levels but " uncommonly the entire cord in continuity.      MR-BRAIN-WITH & W/O    (Results Pending)   MR-CERVICAL SPINE-WITH & W/O    (Results Pending)        Assessment/Plan  * Neuropathy  Assessment & Plan  1/30/2025  Continue gabapentin  B12 is very low, started supplementation  Workup labs are pending  Neurology on board, appreciate recommendations  Consider Lyrica or Cymbalta if needed  Improving    B12 deficiency  Assessment & Plan  1/30/2025  With the neuropathy and elevated homocystine, methylmalonic acid  Start with IM    Bilateral leg weakness- (present on admission)  Assessment & Plan  1/30/2025  For couple months before admission with tingling  MRI showed abnormal sinus with multiple differential diagnosis however neurologist feels the patient has neuropathy due to B12 deficiency and did not recommend any LP or other MRI and no need for steroid  PT and OT and PMR    Cocaine abuse (HCC)- (present on admission)  Assessment & Plan  1/30/2025  Urine drug screen was positive for marijuana and cocaine  Encouraged the patient to quit    Bilateral pulmonary embolism (HCC)- (present on admission)  Assessment & Plan  1/30/2025  Present on last admission, discharged 1/9/2025 found to have bilateral pulmonary emboli   Continue Eliquis         VTE prophylaxis: Apixaban    I have performed a physical exam and reviewed and updated ROS and Plan today (1/30/2025). In review of yesterday's note (1/29/2025), there are no changes except as documented above.    Greater than 50 minutes spent prepping to see patient (e.g. review of tests) obtaining and/or reviewing separately obtained history. Performing a medically appropriate examination and/ evaluation.  Counseling and educating the patient/family/caregiver.  Ordering medications, tests, or procedures.  Referring and communicating with other health care professionals.  Documenting clinical information in EPIC.  Independently interpreting results and communicating results to  patient/family/caregiver.  Care coordination.

## 2025-01-31 ENCOUNTER — APPOINTMENT (OUTPATIENT)
Dept: RADIOLOGY | Facility: MEDICAL CENTER | Age: 45
DRG: 073 | End: 2025-01-31
Payer: COMMERCIAL

## 2025-01-31 VITALS
RESPIRATION RATE: 16 BRPM | TEMPERATURE: 97 F | DIASTOLIC BLOOD PRESSURE: 75 MMHG | SYSTOLIC BLOOD PRESSURE: 123 MMHG | OXYGEN SATURATION: 94 % | HEIGHT: 71 IN | WEIGHT: 311.07 LBS | BODY MASS INDEX: 43.55 KG/M2 | HEART RATE: 68 BPM

## 2025-01-31 LAB
ALBUMIN SERPL BCP-MCNC: 3.8 G/DL (ref 3.2–4.9)
BUN SERPL-MCNC: 15 MG/DL (ref 8–22)
CALCIUM ALBUM COR SERPL-MCNC: 9.9 MG/DL (ref 8.5–10.5)
CALCIUM SERPL-MCNC: 9.7 MG/DL (ref 8.5–10.5)
CHLORIDE SERPL-SCNC: 101 MMOL/L (ref 96–112)
CO2 SERPL-SCNC: 26 MMOL/L (ref 20–33)
CREAT SERPL-MCNC: 1.16 MG/DL (ref 0.5–1.4)
ERYTHROCYTE [DISTWIDTH] IN BLOOD BY AUTOMATED COUNT: 43.5 FL (ref 35.9–50)
GFR SERPLBLD CREATININE-BSD FMLA CKD-EPI: 79 ML/MIN/1.73 M 2
GLUCOSE SERPL-MCNC: 98 MG/DL (ref 65–99)
HCT VFR BLD AUTO: 37.3 % (ref 42–52)
HGB BLD-MCNC: 12.4 G/DL (ref 14–18)
MAGNESIUM SERPL-MCNC: 2 MG/DL (ref 1.5–2.5)
MCH RBC QN AUTO: 29.7 PG (ref 27–33)
MCHC RBC AUTO-ENTMCNC: 33.2 G/DL (ref 32.3–36.5)
MCV RBC AUTO: 89.4 FL (ref 81.4–97.8)
PHOSPHATE SERPL-MCNC: 2.9 MG/DL (ref 2.5–4.5)
PLATELET # BLD AUTO: 254 K/UL (ref 164–446)
PMV BLD AUTO: 9.2 FL (ref 9–12.9)
POTASSIUM SERPL-SCNC: 4.7 MMOL/L (ref 3.6–5.5)
RBC # BLD AUTO: 4.17 M/UL (ref 4.7–6.1)
SODIUM SERPL-SCNC: 135 MMOL/L (ref 135–145)
URATE SERPL-MCNC: 8.1 MG/DL (ref 2.5–8.3)
WBC # BLD AUTO: 5.8 K/UL (ref 4.8–10.8)

## 2025-01-31 PROCEDURE — 99233 SBSQ HOSP IP/OBS HIGH 50: CPT | Performed by: INTERNAL MEDICINE

## 2025-01-31 PROCEDURE — 83735 ASSAY OF MAGNESIUM: CPT

## 2025-01-31 PROCEDURE — 700102 HCHG RX REV CODE 250 W/ 637 OVERRIDE(OP)

## 2025-01-31 PROCEDURE — 97530 THERAPEUTIC ACTIVITIES: CPT

## 2025-01-31 PROCEDURE — 770006 HCHG ROOM/CARE - MED/SURG/GYN SEMI*

## 2025-01-31 PROCEDURE — 36415 COLL VENOUS BLD VENIPUNCTURE: CPT

## 2025-01-31 PROCEDURE — A9270 NON-COVERED ITEM OR SERVICE: HCPCS

## 2025-01-31 PROCEDURE — 85027 COMPLETE CBC AUTOMATED: CPT

## 2025-01-31 PROCEDURE — 700111 HCHG RX REV CODE 636 W/ 250 OVERRIDE (IP): Mod: JZ

## 2025-01-31 PROCEDURE — 84550 ASSAY OF BLOOD/URIC ACID: CPT

## 2025-01-31 PROCEDURE — 97535 SELF CARE MNGMENT TRAINING: CPT

## 2025-01-31 PROCEDURE — 80069 RENAL FUNCTION PANEL: CPT

## 2025-01-31 PROCEDURE — 700102 HCHG RX REV CODE 250 W/ 637 OVERRIDE(OP): Performed by: INTERNAL MEDICINE

## 2025-01-31 PROCEDURE — A9270 NON-COVERED ITEM OR SERVICE: HCPCS | Performed by: INTERNAL MEDICINE

## 2025-01-31 PROCEDURE — 700111 HCHG RX REV CODE 636 W/ 250 OVERRIDE (IP): Performed by: INTERNAL MEDICINE

## 2025-01-31 PROCEDURE — 99232 SBSQ HOSP IP/OBS MODERATE 35: CPT

## 2025-01-31 RX ORDER — PREDNISONE 20 MG/1
40 TABLET ORAL DAILY
Status: DISCONTINUED | OUTPATIENT
Start: 2025-01-31 | End: 2025-02-02 | Stop reason: HOSPADM

## 2025-01-31 RX ORDER — INDOMETHACIN 25 MG/1
25 CAPSULE ORAL
Status: DISCONTINUED | OUTPATIENT
Start: 2025-01-31 | End: 2025-01-31

## 2025-01-31 RX ORDER — ALLOPURINOL 100 MG/1
100 TABLET ORAL DAILY
Status: DISCONTINUED | OUTPATIENT
Start: 2025-02-01 | End: 2025-02-02 | Stop reason: HOSPADM

## 2025-01-31 RX ADMIN — CARVEDILOL 6.25 MG: 6.25 TABLET, FILM COATED ORAL at 17:29

## 2025-01-31 RX ADMIN — OXYCODONE HYDROCHLORIDE 10 MG: 10 TABLET ORAL at 04:31

## 2025-01-31 RX ADMIN — GABAPENTIN 600 MG: 300 CAPSULE ORAL at 17:28

## 2025-01-31 RX ADMIN — GABAPENTIN 600 MG: 300 CAPSULE ORAL at 04:29

## 2025-01-31 RX ADMIN — FUROSEMIDE 20 MG: 20 TABLET ORAL at 04:29

## 2025-01-31 RX ADMIN — MORPHINE SULFATE 4 MG: 4 INJECTION, SOLUTION INTRAMUSCULAR; INTRAVENOUS at 13:42

## 2025-01-31 RX ADMIN — DIBASIC SODIUM PHOSPHATE, MONOBASIC POTASSIUM PHOSPHATE AND MONOBASIC SODIUM PHOSPHATE 500 MG: 852; 155; 130 TABLET ORAL at 09:30

## 2025-01-31 RX ADMIN — SACUBITRIL AND VALSARTAN 1 TABLET: 24; 26 TABLET, FILM COATED ORAL at 04:29

## 2025-01-31 RX ADMIN — SENNOSIDES AND DOCUSATE SODIUM 2 TABLET: 50; 8.6 TABLET ORAL at 17:29

## 2025-01-31 RX ADMIN — OXYCODONE HYDROCHLORIDE 10 MG: 10 TABLET ORAL at 12:04

## 2025-01-31 RX ADMIN — APIXABAN 5 MG: 5 TABLET, FILM COATED ORAL at 17:29

## 2025-01-31 RX ADMIN — APIXABAN 5 MG: 5 TABLET, FILM COATED ORAL at 04:29

## 2025-01-31 RX ADMIN — PREDNISONE 40 MG: 20 TABLET ORAL at 13:36

## 2025-01-31 RX ADMIN — GABAPENTIN 600 MG: 300 CAPSULE ORAL at 12:04

## 2025-01-31 RX ADMIN — LORAZEPAM 2 MG: 2 INJECTION, SOLUTION INTRAMUSCULAR; INTRAVENOUS at 14:10

## 2025-01-31 RX ADMIN — SENNOSIDES AND DOCUSATE SODIUM 2 TABLET: 50; 8.6 TABLET ORAL at 04:29

## 2025-01-31 RX ADMIN — CARVEDILOL 6.25 MG: 6.25 TABLET, FILM COATED ORAL at 09:30

## 2025-01-31 RX ADMIN — OXYCODONE HYDROCHLORIDE 10 MG: 10 TABLET ORAL at 20:32

## 2025-01-31 RX ADMIN — SACUBITRIL AND VALSARTAN 1 TABLET: 24; 26 TABLET, FILM COATED ORAL at 17:29

## 2025-01-31 ASSESSMENT — COGNITIVE AND FUNCTIONAL STATUS - GENERAL
MOBILITY SCORE: 16
WALKING IN HOSPITAL ROOM: A LOT
SUGGESTED CMS G CODE MODIFIER DAILY ACTIVITY: CK
CLIMB 3 TO 5 STEPS WITH RAILING: A LOT
DAILY ACTIVITIY SCORE: 19
STANDING UP FROM CHAIR USING ARMS: A LITTLE
HELP NEEDED FOR BATHING: A LOT
TURNING FROM BACK TO SIDE WHILE IN FLAT BAD: A LITTLE
MOVING TO AND FROM BED TO CHAIR: A LITTLE
DRESSING REGULAR LOWER BODY CLOTHING: A LOT
SUGGESTED CMS G CODE MODIFIER MOBILITY: CK
MOVING FROM LYING ON BACK TO SITTING ON SIDE OF FLAT BED: A LITTLE
TOILETING: A LITTLE

## 2025-01-31 ASSESSMENT — ENCOUNTER SYMPTOMS
NAUSEA: 0
DIARRHEA: 0
ABDOMINAL PAIN: 0
COUGH: 0
TINGLING: 1
HEADACHES: 0
FEVER: 0
CHILLS: 0
FOCAL WEAKNESS: 1
WEAKNESS: 1
PALPITATIONS: 0
HEARTBURN: 0
SHORTNESS OF BREATH: 0
VOMITING: 0
DIZZINESS: 0

## 2025-01-31 ASSESSMENT — PAIN DESCRIPTION - PAIN TYPE
TYPE: ACUTE PAIN

## 2025-01-31 ASSESSMENT — GAIT ASSESSMENTS: GAIT LEVEL OF ASSIST: UNABLE TO PARTICIPATE

## 2025-01-31 NOTE — PROGRESS NOTES
Pt brought down for MRIs; pt was medicated by RN prior to transport. Attempted to position pt; pt states he cannot handle doing the scan. RN informed.

## 2025-01-31 NOTE — PROGRESS NOTES
[facility] Medicine Daily Progress Note    Date of Service  1/31/2025    Chief Complaint  Oracio Jurado is a 44 y.o. male admitted 1/26/2025 with ascending weakness/numbness and tingling.    Hospital Course    44-year-old male with history of cocaine abuse, PE on Eliquis presented 1/9 with weakness and numbness on his lower extremities. Patient states his symptoms started about 1 month before the admission, however progressively worsened the past 2 weeks making it difficult for the patient to walk. States it started at his feet, then to his knees and to his epigastric area below his rib cage with associated numbness and tingling and sciatic pain that radiates down his bilateral lower extremities, worse on the left. Denies any bowel or bladder incontinence, denies any saddle anesthesia, and does feel some muscle spasms over his torso area. Denies any shortness of breath or trouble breathing.  Denied any fever or drug abuse.  No new medication starts, denies any sore throat, no bloody diarrhea in the past few months. Patient transferred to [facility] for neurologic evaluation and MRI.  MRI for lumbar spine did not show any stenosis however for thoracic spine showed abnormal enhancement would be unlikely to represent multiple sclerosis, possible infection versus autoimmune myelitis also acute cord infarction involving the central gray matter.  Neurologist evaluated the patient who recommended discontinue Solu-Medrol which was started on admission and neurology team feels the patient has peripheral neuropathy versus toxic neuropathy.  No need for LP and he did not feel patient has MS or GBS.  B12 was very low at and homocystine was high, B12 supplementation was started  PT and OT evaluated the patient recommended placement.        Interval Problem Update  -Evaluated examined the patient at bedside, patient states his pain and numbness improved than yesterday  -Continue B12 supplementation IM and gabapentin  -Discussed with  neurology team, patient will need more workup for neuropathy and MRI brain and C-spine was ordered.  -Labs reviewed around baseline, all images and labs were discussed in detail with the patient, answered all his questions    Patient was seen and examined at bedside.  I have personally reviewed and interpreted vitals, labs, and imaging.    1/28.  Afebrile.  Intermittent bradycardia.  On room air.  Denies fever, chills, chest pains.  States that when he puts his feet on the floor he gets shivering raising his feet all the way up to his torso so that he gets whole body shivers.  He does state strength is improving.  He was stabilized and sent today.  1/29.  Afebrile.  Intermittent bradycardia.  On room air.  Denies fever, chills, chest pains, shortness of breath.  Complains of constipation.  States the paresthesias in his legs is getting better.  Working to mobilize more.  1/30.  Afebrile.  Stable vitals.  On room air.  Denies fevers, chills, chest pains, shortness of breath.  Having persistent lower extremity paresthesias.  Cannot tolerate MRI brain and cervical this morning.  Will try again this afternoon with Ativan.  1/31.  Afebrile.  Intermittent bradycardia.  On room air.  Replete phosphorus.  Denies fever, chills, chest pains, shortness of breath paresthesias in lower extremities are about the same.  He does state arthritic pain mostly in his left ankle and knee which he attributes to gout.  Started on oral prednisone, topical diclofenac.  Uric acid is within normal limits.  Patient still unable to tolerate MRI today even after Ativan.  Ordered MRI with contrast.  Hgb 12.4  Na 133 > 137 > 135    I have discussed this patient's plan of care and discharge plan at IDT rounds today with Case Management, Nursing, Nursing leadership, and other members of the IDT team.    Consultants/Specialty  neurology    Code Status  Full Code    Disposition  The patient is not medically cleared for discharge to home or a  post-acute facility.  Anticipate discharge to: skilled nursing facility    I have placed the appropriate orders for post-discharge needs.    Review of Systems  Review of Systems   Constitutional:  Negative for chills, fever and malaise/fatigue.   Respiratory:  Negative for cough and shortness of breath.    Cardiovascular:  Negative for chest pain, palpitations and leg swelling.   Gastrointestinal:  Negative for abdominal pain, diarrhea, heartburn, nausea and vomiting.   Genitourinary:  Negative for dysuria, frequency and urgency.   Neurological:  Positive for tingling, focal weakness and weakness. Negative for dizziness and headaches.   All other systems reviewed and are negative.       Physical Exam  Temp:  [36.1 °C (97 °F)-36.6 °C (97.9 °F)] 36.1 °C (97 °F)  Pulse:  [58-78] 60  Resp:  [16-20] 16  BP: (109-121)/(68-82) 116/71  SpO2:  [92 %-100 %] 92 %    Physical Exam  Vitals and nursing note reviewed.   Constitutional:       General: He is not in acute distress.     Appearance: Normal appearance. He is not ill-appearing.   HENT:      Head: Normocephalic and atraumatic.      Jaw: There is normal jaw occlusion.      Right Ear: Hearing normal.      Left Ear: Hearing normal.      Nose: Nose normal.      Mouth/Throat:      Lips: Pink.      Mouth: Mucous membranes are moist.   Eyes:      Extraocular Movements: Extraocular movements intact.      Conjunctiva/sclera: Conjunctivae normal.      Pupils: Pupils are equal, round, and reactive to light.   Neck:      Vascular: No carotid bruit.   Cardiovascular:      Rate and Rhythm: Normal rate and regular rhythm.      Pulses: Normal pulses.      Heart sounds: Normal heart sounds, S1 normal and S2 normal.   Pulmonary:      Effort: Pulmonary effort is normal.      Breath sounds: Normal breath sounds and air entry. No stridor.   Musculoskeletal:      Cervical back: Normal range of motion and neck supple.      Right lower leg: No edema.      Left lower leg: No edema.   Skin:      General: Skin is warm and dry.      Capillary Refill: Capillary refill takes less than 2 seconds.   Neurological:      Mental Status: He is alert and oriented to person, place, and time.      Sensory: Sensory deficit present.      Motor: Weakness present.      Gait: Gait abnormal.   Psychiatric:         Attention and Perception: Attention and perception normal.         Mood and Affect: Mood and affect normal.         Speech: Speech normal.         Behavior: Behavior normal. Behavior is cooperative.         Fluids    Intake/Output Summary (Last 24 hours) at 1/31/2025 0756  Last data filed at 1/31/2025 0400  Gross per 24 hour   Intake 436 ml   Output 3375 ml   Net -2939 ml        Laboratory  Recent Labs     01/29/25  0525 01/30/25  0635 01/31/25  0424   WBC 6.8 6.0 5.8   RBC 4.15* 4.11* 4.17*   HEMOGLOBIN 12.4* 12.4* 12.4*   HEMATOCRIT 37.1* 36.4* 37.3*   MCV 89.4 88.6 89.4   MCH 29.9 30.2 29.7   MCHC 33.4 34.1 33.2   RDW 43.7 43.5 43.5   PLATELETCT 271 255 254   MPV 8.8* 9.3 9.2     Recent Labs     01/29/25  0525 01/30/25  0635 01/31/25  0424   SODIUM 133* 137 135   POTASSIUM 4.1 4.5 4.7   CHLORIDE 99 104 101   CO2 25 23 26   GLUCOSE 97 100* 98   BUN 24* 18 15   CREATININE 1.24 1.12 1.16   CALCIUM 9.4 8.7 9.7                   Imaging  MR-LUMBAR SPINE-WITH & W/O   Final Result      1.  Transitional lumbosacral junction. Vestigial ribs at the T12 level. Partial sacralization of L5.   2.  L3-4 mild facet hypertrophy.   3.  L4-5 mild facet hypertrophy. Mild bilateral foraminal narrowing.   4.  L5-S1 mild right foraminal stenosis.   5.  Prominent epidural fat at the lower lumbar/lumbosacral levels. No central stenosis on the basis of disc disease or spondylotic change.      MR-THORACIC SPINE-WITH & W/O   Final Result      1.  Diffusely abnormal T2 hyperintense central cord signal from at least C6-7 down to T10-T11. Questionable corresponding enhancement demonstrated just at the T6-T7 level. Diffuse cord involvement in  "this fashion would be unlikely to represent multiple    sclerosis. Longitudinal multilevel cord involvement in this fashion may represent infectious or autoimmune myelitis. Viral etiologies such as West Nile virus might have this appearance. Acute cord infarction involves the central gray matter, however, the    pattern of involvement does not show the characteristic \"H\" morphology. Diffuse thoracic cord edema based on venous congestion can be seen associated with dural AV fistula. (Foix-Alajouanine syndrome). Finally, neuromyelitis optica (NMO) can manifest as    multilevel longitudinal cord lesions, however, these are usually more segmented with lesions spanning 3 or 4 levels but uncommonly the entire cord in continuity.      MR-BRAIN-WITH & W/O    (Results Pending)   MR-CERVICAL SPINE-WITH & W/O    (Results Pending)        Assessment/Plan  * Neuropathy  Assessment & Plan  1/31/2025  Continue gabapentin  B12 is very low, started supplementation  Workup labs are pending  Neurology on board, appreciate recommendations  Consider Lyrica or Cymbalta if needed  Improving    B12 deficiency  Assessment & Plan  1/31/2025  With the neuropathy and elevated homocystine, methylmalonic acid  Start with IM    Bilateral leg weakness- (present on admission)  Assessment & Plan  1/31/2025  For couple months before admission with tingling  MRI showed abnormal sinus with multiple differential diagnosis however neurologist feels the patient has neuropathy due to B12 deficiency and did not recommend any LP or other MRI and no need for steroid  PT and OT and PMR    Cocaine abuse (HCC)- (present on admission)  Assessment & Plan  1/31/2025  Urine drug screen was positive for marijuana and cocaine  Encouraged the patient to quit    Bilateral pulmonary embolism (HCC)- (present on admission)  Assessment & Plan  1/31/2025  Present on last admission, discharged 1/9/2025 found to have bilateral pulmonary emboli   Continue Eliquis         VTE " prophylaxis: Apixaban    I have performed a physical exam and reviewed and updated ROS and Plan today (1/31/2025). In review of yesterday's note (1/30/2025), there are no changes except as documented above.    Greater than 51 minutes spent prepping to see patient (e.g. review of tests) obtaining and/or reviewing separately obtained history. Performing a medically appropriate examination and/ evaluation.  Counseling and educating the patient/family/caregiver.  Ordering medications, tests, or procedures.  Referring and communicating with other health care professionals.  Documenting clinical information in EPIC.  Independently interpreting results and communicating results to patient/family/caregiver.  Care coordination.

## 2025-01-31 NOTE — CARE PLAN
The patient is Stable - Low risk of patient condition declining or worsening    Shift Goals  Clinical Goals: Monitor neuro status, pain management  Patient Goals: Pain control  Family Goals: maria teresa    Progress made toward(s) clinical / shift goals:      Problem: Pain - Standard  Goal: Alleviation of pain or a reduction in pain to the patient’s comfort goal  Outcome: Progressing   Frequent pain assessments throughout shift. PRN pain medications provided per MAR and pt request. Pharmacological and non-pharmacological interventions utilized.      Problem: Neuro Status  Goal: Neuro status will remain stable or improve  Outcome: Progressing    Q4H neuro checks in place. Pt's neurological status (A/Ox4) remains unchanged throughout shift. Bed alarm is on, call light within reach.     Patient is not progressing towards the following goals:

## 2025-01-31 NOTE — PROGRESS NOTES
Acute Neurology Progress Note  Neurohospitalist Service, [facility]      Referring Physician: Gabriel Mckeon D.O.      Interval History: No acute events overnight.    Review of systems: In addition to what is detailed in the HPI and/or updated in the interval history, all other systems reviewed and are negative.    Past Medical History, Past Surgical History and Social History reviewed and unchanged from prior    Medications:    Current Facility-Administered Medications:     phosphorus (K-Phos-Neutral) per tablet 500 mg, 2 Tablet, Oral, Once, Gabriel Mckeon D.O.    LORazepam (Ativan) injection 2 mg, 2 mg, Intravenous, Once PRN, Gabriel Mckeon D.O.    senna-docusate (Pericolace Or Senokot S) 8.6-50 MG per tablet 2 Tablet, 2 Tablet, Oral, BID, 2 Tablet at 01/31/25 0429 **AND** polyethylene glycol/lytes (Miralax) Packet 1 Packet, 1 Packet, Oral, DAILY, Gabriel Mckeon D.O., 1 Packet at 01/29/25 1446    bisacodyl (Dulcolax) suppository 10 mg, 10 mg, Rectal, QDAY PRN, Gabriel Mckeon D.O.    acetaminophen (Tylenol) tablet 650 mg, 650 mg, Oral, Q6HRS PRN, Jorge Luis Jackson D.O., 650 mg at 01/28/25 0738    apixaban (Eliquis) tablet 5 mg, 5 mg, Oral, BID, LUIS MichaelOSharif, 5 mg at 01/31/25 0429    carvedilol (Coreg) tablet 6.25 mg, 6.25 mg, Oral, BID WITH MEALS, LUIS MichaelOSharif, 6.25 mg at 01/30/25 1646    sacubitril-valsartan (Entresto) 24-26 MG 1 Tablet, 1 Tablet, Oral, BID, LUIS MichaelO., 1 Tablet at 01/31/25 0429    furosemide (Lasix) tablet 20 mg, 20 mg, Oral, DAILY, LUIS MichaelOSharif, 20 mg at 01/31/25 0429    Pharmacy Consult Request ...Pain Management Review 1 Each, 1 Each, Other, PHARMACY TO DOSE, SHI Howard.    oxyCODONE immediate-release (Roxicodone) tablet 5 mg, 5 mg, Oral, Q3HRS PRN, 5 mg at 01/30/25 1645 **OR** oxyCODONE immediate release (Roxicodone) tablet 10 mg, 10 mg, Oral, Q3HRS PRN, 10 mg at 01/31/25 0431 **OR** morphine 4  "MG/ML injection 4 mg, 4 mg, Intravenous, Q3HRS PRN, CRISTELA HowardP.RSharifNSharif    gabapentin (Neurontin) capsule 600 mg, 600 mg, Oral, TID, CRISTELA NewmanP.R.N., 600 mg at 01/31/25 0429    [COMPLETED] cyanocobalamin (Vitamin B-12) injection 1,000 mcg, 1,000 mcg, Intramuscular, DAILY, 1,000 mcg at 01/28/25 0419 **FOLLOWED BY** cyanocobalamin (Vitamin B-12) injection 1,000 mcg, 1,000 mcg, Intramuscular, Q7 DAYS, 1,000 mcg at 01/29/25 0516 **FOLLOWED BY** [START ON 2/26/2025] cyanocobalamin (Vitamin B-12) injection 1,000 mcg, 1,000 mcg, Intramuscular, Q30 DAYS, CRISTELA NewmanP.R.N.    Physical Examination:   /71   Pulse 60   Temp 36.1 °C (97 °F) (Temporal)   Resp 16   Ht 1.803 m (5' 11\") Comment: per 's license  Wt (!) 141 kg (311 lb 1.1 oz)   SpO2 92%   BMI 43.39 kg/m²       General: Patient is awake and in no acute distress  Neck: There is normal range of motion  CV: Regular rate   Extremities:  Warm, dry, and intact, without peripheral lower extremity edema    NEUROLOGICAL EXAM:     Mental status: Awake, alert and fully oriented, follows commands  Speech and language: Speech is clear and fluent. The patient is able to name and repeat.  Cranial nerve exam:    CN II-III: PERRLA   CN II: Visual Fields Intact   CN III, IV, VI: EOMI w/o Nystagmus   CN V: Facial sensation intact, full strength with mastication   CN VII: Symmetrical facial movement   CN VIII: Hearing grossly normal   CN IX, X: Palate elevates at midline   CN XI: Symmetric shoulder shrug   CN XII: Tongue midline    Motor exam: There is sustained antigravity with no downward drift in bilateral arms and legs.  Normal tone/bulk. No tremors or pronator drift   RUE 5/5   LUE 5/5   RLL 4+/5   LLL 4+/5     Reflexes:   Brachioradialus  R 2/ L 2   Biceps  R 2/ L 2   Triceps  R 2/ L 2   Patellar R 2/ L 2   Achilles R 2/ L 2  Unable to test babinski at this time due to pain    Sensory exam:  Reacts to tactile in all 4 extremities, " increased pain in bilateral lower extremities, described as sharp tingling sensation starting from the bottom of this feet and moving up. Now improving   Coordination: No ataxia on bilateral FTN testing      Objective Data:    Labs:  Lab Results   Component Value Date/Time    PROTHROMBTM 14.2 01/07/2025 03:06 AM    INR 1.10 01/07/2025 03:06 AM      Lab Results   Component Value Date/Time    WBC 5.8 01/31/2025 04:24 AM    RBC 4.17 (L) 01/31/2025 04:24 AM    HEMOGLOBIN 12.4 (L) 01/31/2025 04:24 AM    HEMATOCRIT 37.3 (L) 01/31/2025 04:24 AM    MCV 89.4 01/31/2025 04:24 AM    MCH 29.7 01/31/2025 04:24 AM    MCHC 33.2 01/31/2025 04:24 AM    MPV 9.2 01/31/2025 04:24 AM    NEUTSPOLYS 63.00 01/26/2025 06:43 AM    LYMPHOCYTES 23.40 01/26/2025 06:43 AM    MONOCYTES 9.70 01/26/2025 06:43 AM    EOSINOPHILS 3.10 01/26/2025 06:43 AM    BASOPHILS 0.50 01/26/2025 06:43 AM      Lab Results   Component Value Date/Time    SODIUM 135 01/31/2025 04:24 AM    POTASSIUM 4.7 01/31/2025 04:24 AM    CHLORIDE 101 01/31/2025 04:24 AM    CO2 26 01/31/2025 04:24 AM    GLUCOSE 98 01/31/2025 04:24 AM    BUN 15 01/31/2025 04:24 AM    CREATININE 1.16 01/31/2025 04:24 AM      Lab Results   Component Value Date/Time    CHOLSTRLTOT 139 01/06/2025 01:45 AM    LDL 84 01/06/2025 01:45 AM    HDL 41 01/06/2025 01:45 AM    TRIGLYCERIDE 70 01/06/2025 01:45 AM       Lab Results   Component Value Date/Time    ALKPHOSPHAT 75 01/27/2025 03:43 AM    ASTSGOT 16 01/27/2025 03:43 AM    ALTSGPT 19 01/27/2025 03:43 AM    TBILIRUBIN 0.5 01/27/2025 03:43 AM        Imaging/Testing:    I interpreted and/or reviewed the patient's neuroimaging    MR-LUMBAR SPINE-WITH & W/O   Final Result      1.  Transitional lumbosacral junction. Vestigial ribs at the T12 level. Partial sacralization of L5.   2.  L3-4 mild facet hypertrophy.   3.  L4-5 mild facet hypertrophy. Mild bilateral foraminal narrowing.   4.  L5-S1 mild right foraminal stenosis.   5.  Prominent epidural fat at the  "lower lumbar/lumbosacral levels. No central stenosis on the basis of disc disease or spondylotic change.      MR-THORACIC SPINE-WITH & W/O   Final Result      1.  Diffusely abnormal T2 hyperintense central cord signal from at least C6-7 down to T10-T11. Questionable corresponding enhancement demonstrated just at the T6-T7 level. Diffuse cord involvement in this fashion would be unlikely to represent multiple    sclerosis. Longitudinal multilevel cord involvement in this fashion may represent infectious or autoimmune myelitis. Viral etiologies such as West Nile virus might have this appearance. Acute cord infarction involves the central gray matter, however, the    pattern of involvement does not show the characteristic \"H\" morphology. Diffuse thoracic cord edema based on venous congestion can be seen associated with dural AV fistula. (Foix-Alajouanine syndrome). Finally, neuromyelitis optica (NMO) can manifest as    multilevel longitudinal cord lesions, however, these are usually more segmented with lesions spanning 3 or 4 levels but uncommonly the entire cord in continuity.      MR-BRAIN-WITH & W/O    (Results Pending)   MR-CERVICAL SPINE-WITH & W/O    (Results Pending)      Latest Reference Range & Units 01/27/25 00:50   Amphetamines Urine Negative  Negative   Barbiturates Negative  Negative   Benzodiazepines Negative  Negative   Cannabinoid Metab Negative  Positive !   Cocaine Metabolite Negative  Positive !   Methadone Negative  Negative   Opiates Negative  Positive !   Oxycodone Negative  Positive !   Phencyclidine -Pcp Negative  Negative   Propoxyphene Negative  Negative   !: Data is abnormal     Latest Reference Range & Units 01/27/25 00:50   Fentanyl, Urine Negative  Positive !   !: Data is abnormal     Latest Reference Range & Units 01/26/25 12:04   Copper Serum 70.0 - 140.0 ug/dL 174.6 (H)   (H): Data is abnormally high     Latest Reference Range & Units 01/26/25 12:04   Vitamin B12 -True Cobalamin 211 - " "911 pg/mL 156 (L)   (L): Data is abnormally low    01/29/25 1426  VITAMIN E  Collected: 01/26/25 1204  Final result  Specimen: Blood    Alpha-Tocopherol (Vit E) 5.3 Low  mg/L  Gamma-Tocopherol (Vit E) 1.0 mg/L           01/29/25 1420  METHYLMALONIC ACID, SERUM  Collected: 01/26/25 1204  Final result    Methylmalonic Acid, Serum 14.35 High  umol/L          Latest Reference Range & Units 01/26/25 12:04   Copper Serum 70.0 - 140.0 ug/dL 174.6 (H)   (H): Data is abnormally high     Latest Reference Range & Units 01/27/25 12:41   Aquaporin-4 Receptor IgG,Serum <1:10  <1:10       Impression & Recommendations:      44 year old male with a history of polysubstance abuse, heart failure, who presents with one month lower extremity weakness, pins and needles pain in feet, and subjective tightness in abdomen and thighs. No acute change overnight. MRI T & L spine shows extensive non-enhancing T2 lesion in posterior column of thoracis spine C6-T11 and severe B12 deficiency on labs. B12 currently being replaced, follow titration schedule ordered. This morning, patient states that the pain seems to be improving slightly. Does state that the pain now comes in waves rather than constant. States his strength also improves and only feels weakend upon a \"flare up of pain.\" AQP-4 shows negative. Unable to obtain MRI yesterday due to anxiety, prn Ativan ordered by primary team for MRI to be completed. Methylmalonic acid resulted high at 14.35, consistent with B12 deficiency. Suspicion remains high for B12 deficiency in the setting of polysubstance abuse and huffing nitrous oxide.    Plan:  -MRI brain with and without- pending  -MRI C-spine-pending   -PRN ativan for MRI study   -PT/OT  -Continue gabapentin 600 mg TID for neuropathy  -Continue replacement of B12 1000 mcg weekly x 4 weeks, then maintenance once monthly   -Neurology will follow to review neuroimaging     TODD Juares  Acute Neurology        The evaluation of the " patient, and recommended management, was discussed with Dr. Velasco, the attending Acute Neurologist. I have performed a physical exam and reviewed and updated ROS and Plan today (1/31/2025).      Please note that this dictation was created using voice recognition software.  I have made every reasonable attempt to correct obvious errors, but I expect that there are errors of grammar and possibly content that I did not discover before finalizing the note.

## 2025-01-31 NOTE — THERAPY
"Occupational Therapy  Daily Treatment     Patient Name: Oracio Jurado  Age:  44 y.o., Sex:  male  Medical Record #: [id number]  Today's Date: 1/31/2025     Precautions  Precautions: (P) Fall Risk, Swallow Precautions    Assessment    Pt seen for follow up OT tx session, pt making steady progress with functional mobility and ADLs still frustrated by L ankle pain feels like a gout flare up to patient and that its hindering ability to ambulate safely. Will continue to see for skilled therapy while admitted as well as recommend post-acute placement.    Plan    Treatment Plan Status: (P) Continue Current Treatment Plan  Type of Treatment: Self Care / Activities of Daily Living, Adaptive Equipment, Neuro Re-Education / Balance, Therapeutic Exercises, Therapeutic Activity  Treatment Frequency: 4 Times per Week  Treatment Duration: Until Therapy Goals Met    DC Equipment Recommendations: (P) Unable to determine at this time  Discharge Recommendations: (P) Recommend post-acute placement for additional occupational therapy services prior to discharge home    Subjective    \"I've had this pain before and I know what meds help it\"     Objective       01/31/25 1114   Precautions   Precautions Fall Risk;Swallow Precautions   Vitals   O2 Delivery Device None - Room Air   Pain 0 - 10 Group   Therapist Pain Assessment Post Activity Pain Same as Prior to Activity;Nurse Notified   Cognition    Cognition / Consciousness WDL   Orientation Level Oriented x 4   Level of Consciousness Alert   Active ROM Upper Body   Active ROM Upper Body  WDL   Dominant Hand Right   Strength Upper Body   Upper Body Strength  WDL   Balance   Sitting Balance (Static) Good   Sitting Balance (Dynamic) Fair +   Standing Balance (Static) Fair -   Standing Balance (Dynamic) Poor +   Weight Shift Sitting Normal   Weight Shift Standing Poor   Skilled Intervention Facilitation;Verbal Cuing   Comments w/ FWW   Bed Mobility    Comments up in chair, returned to chair "   Activities of Daily Living   Upper Body Dressing Supervision   Lower Body Dressing Moderate Assist   Toileting Standby Assist   Skilled Intervention Verbal Cuing;Facilitation   How much help from another person does the patient currently need...   Putting on and taking off regular lower body clothing? 2   Bathing (including washing, rinsing, and drying)? 2   Toileting, which includes using a toilet, bedpan, or urinal? 3   Putting on and taking off regular upper body clothing? 4   Taking care of personal grooming such as brushing teeth? 4   Eating meals? 4   6 Clicks Daily Activity Score 19   Functional Mobility   Sit to Stand Minimal Assist   Toilet Transfers Minimal Assist   Transfer Method Stand Pivot   Mobility STS from chair, steps to bedside commode and back to chair   Skilled Intervention Verbal Cuing;Facilitation   Comments w/ FWW   Activity Tolerance   Sitting in Chair 15 min  (BSC)   Standing 5 min total   Patient / Family Goals   Patient / Family Goal #1 to get better   Goal #1 Outcome Progressing as expected   Short Term Goals   Short Term Goal # 1 Pt will complete toilet transfer SBA   Goal Outcome # 1 Progressing as expected   Short Term Goal # 2 Pt will complete LB dressing with A/E as needed SBA   Goal Outcome # 2 Progressing as expected   Short Term Goal # 3 Pt will complete standing grooming tasks SBA   Goal Outcome # 3 Goal not met   Short Term Goal # 4 Pt will complete toileting SBA   Goal Outcome # 4 Goal met   Education Group   Education Provided Role of Occupational Therapist   Role of Occupational Therapist Patient Response Patient;Acceptance;Explanation;Demonstration;Verbal Demonstration;Action Demonstration   Occupational Therapy Treatment Plan    O.T. Treatment Plan Continue Current Treatment Plan   Anticipated Discharge Equipment and Recommendations   DC Equipment Recommendations Unable to determine at this time   Discharge Recommendations Recommend post-acute placement for additional  occupational therapy services prior to discharge home   Interdisciplinary Plan of Care Collaboration   IDT Collaboration with  Nursing   Patient Position at End of Therapy Seated;Chair Alarm On;Call Light within Reach;Tray Table within Reach;Phone within Reach   Collaboration Comments RN updated

## 2025-01-31 NOTE — THERAPY
Physical Therapy   Daily Treatment     Patient Name: Oracio Jurado  Age:  44 y.o., Sex:  male  Medical Record #: [id number]  Today's Date: 1/31/2025     Precautions  Precautions: Fall Risk;Swallow Precautions    Assessment    Patient seen for PT treatment session. Patient in bed, agreeable for the session. Patient limited with functional mobility this session due to return of excruciating pain in his L knee and L ankle affecting his ability to weight shift to LLE and maintain WB on LLE.  Able to participate with bed mobility, EOB sitting, sit-stand with FWW, chair transfer, LE ex's as detailed below. Will continue to benefit from PT services and recommend post-acute placement at this time.     Plan    Treatment Plan Status: Continue Current Treatment Plan  Type of Treatment: Bed Mobility, Equipment, Family / Caregiver Training, Gait Training, Neuro Re-Education / Balance, Stair Training, Therapeutic Activities, Therapeutic Exercise  Treatment Frequency: 4 Times per Week  Treatment Duration: Until Therapy Goals Met    DC Equipment Recommendations: Unable to determine at this time  Discharge Recommendations: Recommend post-acute placement for additional physical therapy services prior to discharge home    Objective     01/31/25 0935   Time In/Time Out   Therapy Start Time 0914   Therapy End Time 0935   Total Therapy Time 21   Precautions   Precautions Fall Risk;Swallow Precautions   Vitals   Pulse 64   Patient BP Position Sitting  (EOB)   Blood Pressure 132/78   Pulse Oximetry 96 %   O2 Delivery Device None - Room Air   Vitals Comments Post activity, seated in the chair: /78, HR 60, SpO2 93   Pain   Pain Scales 0 to 10 Scale    Intervention Nurse Notified;Rest   Pain 0 - 10 Group   Location Ankle;Foot;Knee   Location Orientation Right;Left  (L worse than R)   Therapist Pain Assessment Prior to Activity;During Activity;Post Activity;Post Activity Pain Same as Prior to Activity;Nurse Notified  (Patient reporting  excruciating pain, unable to tolerate WB on L foot this date. Patient mentioned some changes in his medications, may have affected the return of pain)   Cognition    Level of Consciousness Alert   Active ROM Lower Body    Active ROM Lower Body  X   Comments RLE-WFL, L hip flexion-WFL, L knee flexion/extension-WFL but requiring increased time & effort, L DF/PF-impaired, minimal ROM only, limited due to pain   Sensation Lower Body   Lower Extremity Sensation   X   Comments Continues to have numbness in B/L LE, worse in L foot   Sitting Lower Body Exercises   Sitting Lower Body Exercises Yes   Ankle Pumps 1 set of 10;Bilateral   Hip Flexion 1 set of 10;Bilateral   Long Arc Quad 1 set of 10;Bilateral   Comments Seated EOB, AROM-within pain free ROM   Other Treatments   Other Treatments Provided Patient was set-up with breakfast tray towards end of session   Balance   Sitting Balance (Static) Good   Sitting Balance (Dynamic) Fair +   Standing Balance (Static) Fair -   Standing Balance (Dynamic) Poor +   Weight Shift Sitting Normal   Weight Shift Standing Poor  (Reduced WB to LLE due to pain)   Skilled Intervention Verbal Cuing;Sequencing;Facilitation   Comments Seated EOB; Standing with FWW   Bed Mobility    Supine to Sit Supervised   Scooting Standby Assist   Skilled Intervention Verbal Cuing;Facilitation   Comments HOB flat, use of bed rail, towards L side   Gait Analysis   Gait Level Of Assist Unable to Participate   Comments Attempted forward/backward steps, side step by bed, patient having increased difficulty to weight shift on LLE due to pain, able to take 1 small side step to R and L with increased time and effort to initiate and complete the task   Functional Mobility   Sit to Stand Minimal Assist   Bed, Chair, Wheelchair Transfer Minimal Assist   Transfer Method Squat Pivot   Mobility Bed-EOB sitting-LE ex's-sit-stand with FWW-side steps-EOB-squat pivot towards L-chair   Skilled Intervention Verbal  Cuing;Sequencing;Facilitation;Compensatory Strategies   Comments Sit-stand W FWW-Cues for hand placement, LE placement; Transfer-cues for hand placement, sequencing   6 Clicks Assessment - How much HELP from from another person do you currently need... (If the patient hasn't done an activity recently, how much help from another person do you think he/she would need if he/she tried?)   Turning from your back to your side while in a flat bed without using bedrails? 3   Moving from lying on your back to sitting on the side of a flat bed without using bedrails? 3   Moving to and from a bed to a chair (including a wheelchair)? 3   Standing up from a chair using your arms (e.g., wheelchair, or bedside chair)? 3   Walking in hospital room? 2   Climbing 3-5 steps with a railing? 2   6 clicks Mobility Score 16   Activity Tolerance   Sitting in Chair Post session   Patient / Family Goals    Patient / Family Goal #1 To return to prior level of functional mobility   Goal #1 Outcome Progressing slower than expected   Short Term Goals    Short Term Goal # 1 Patient will perform supine-sit, sit-supine with HOB flat without rails with supervision in 6 visits to safely get in & out of bed   Goal Outcome # 1 Progressing as expected   Short Term Goal # 2 Patient will perform sit-stand with LRAD with supervision in 6 visits to progress with functional mobility   Goal Outcome # 2 Progressing slower than expected   Short Term Goal # 3 Patient will perform chair transfers with LRAD with supervision in 6 visits to safely get OOB to chair   Goal Outcome # 3 Progressing slower than expected   Short Term Goal # 4 Patient will ambulate 100 feet with LRAD with supervision in 6 visits to safely ambulate household distance   Goal Outcome # 4 Goal not met   Short Term Goal # 5 Patient will negotiate 1 step threshold with LRAD with supervision in 6 visits to safely get in & out home   Goal Outcome # 5 Goal not met   Physical Therapy Treatment Plan    Physical Therapy Treatment Plan Continue Current Treatment Plan   Anticipated Discharge Equipment and Recommendations   DC Equipment Recommendations Unable to determine at this time   Discharge Recommendations Recommend post-acute placement for additional physical therapy services prior to discharge home   Interdisciplinary Plan of Care Collaboration   IDT Collaboration with  Nursing   Patient Position at End of Therapy Seated;Chair Alarm On;Call Light within Reach;Tray Table within Reach;Phone within Reach   Session Information   Date / Session Number  1/31-3(3/4, 2/2)

## 2025-02-01 LAB
ALBUMIN SERPL BCP-MCNC: 3.9 G/DL (ref 3.2–4.9)
BUN SERPL-MCNC: 16 MG/DL (ref 8–22)
CALCIUM ALBUM COR SERPL-MCNC: 9.7 MG/DL (ref 8.5–10.5)
CALCIUM SERPL-MCNC: 9.6 MG/DL (ref 8.5–10.5)
CHLORIDE SERPL-SCNC: 100 MMOL/L (ref 96–112)
CO2 SERPL-SCNC: 24 MMOL/L (ref 20–33)
CREAT SERPL-MCNC: 1.1 MG/DL (ref 0.5–1.4)
ERYTHROCYTE [DISTWIDTH] IN BLOOD BY AUTOMATED COUNT: 41.7 FL (ref 35.9–50)
GFR SERPLBLD CREATININE-BSD FMLA CKD-EPI: 84 ML/MIN/1.73 M 2
GLUCOSE SERPL-MCNC: 121 MG/DL (ref 65–99)
HCT VFR BLD AUTO: 36.5 % (ref 42–52)
HGB BLD-MCNC: 12.6 G/DL (ref 14–18)
MAGNESIUM SERPL-MCNC: 2.1 MG/DL (ref 1.5–2.5)
MCH RBC QN AUTO: 30.4 PG (ref 27–33)
MCHC RBC AUTO-ENTMCNC: 34.5 G/DL (ref 32.3–36.5)
MCV RBC AUTO: 88 FL (ref 81.4–97.8)
PHOSPHATE SERPL-MCNC: 3.4 MG/DL (ref 2.5–4.5)
PLATELET # BLD AUTO: 252 K/UL (ref 164–446)
PMV BLD AUTO: 8.6 FL (ref 9–12.9)
POTASSIUM SERPL-SCNC: 4.5 MMOL/L (ref 3.6–5.5)
RBC # BLD AUTO: 4.15 M/UL (ref 4.7–6.1)
SODIUM SERPL-SCNC: 134 MMOL/L (ref 135–145)
WBC # BLD AUTO: 8.8 K/UL (ref 4.8–10.8)

## 2025-02-01 PROCEDURE — 85027 COMPLETE CBC AUTOMATED: CPT

## 2025-02-01 PROCEDURE — 700111 HCHG RX REV CODE 636 W/ 250 OVERRIDE (IP): Performed by: INTERNAL MEDICINE

## 2025-02-01 PROCEDURE — A9270 NON-COVERED ITEM OR SERVICE: HCPCS | Performed by: INTERNAL MEDICINE

## 2025-02-01 PROCEDURE — 700102 HCHG RX REV CODE 250 W/ 637 OVERRIDE(OP): Performed by: INTERNAL MEDICINE

## 2025-02-01 PROCEDURE — 700102 HCHG RX REV CODE 250 W/ 637 OVERRIDE(OP)

## 2025-02-01 PROCEDURE — 36415 COLL VENOUS BLD VENIPUNCTURE: CPT

## 2025-02-01 PROCEDURE — A9270 NON-COVERED ITEM OR SERVICE: HCPCS

## 2025-02-01 PROCEDURE — 700101 HCHG RX REV CODE 250: Performed by: INTERNAL MEDICINE

## 2025-02-01 PROCEDURE — 83735 ASSAY OF MAGNESIUM: CPT

## 2025-02-01 PROCEDURE — 80069 RENAL FUNCTION PANEL: CPT

## 2025-02-01 PROCEDURE — 99232 SBSQ HOSP IP/OBS MODERATE 35: CPT

## 2025-02-01 PROCEDURE — 770006 HCHG ROOM/CARE - MED/SURG/GYN SEMI*

## 2025-02-01 PROCEDURE — 99233 SBSQ HOSP IP/OBS HIGH 50: CPT | Performed by: INTERNAL MEDICINE

## 2025-02-01 RX ADMIN — APIXABAN 5 MG: 5 TABLET, FILM COATED ORAL at 06:09

## 2025-02-01 RX ADMIN — DICLOFENAC SODIUM 2 G: 10 GEL TOPICAL at 14:34

## 2025-02-01 RX ADMIN — CARVEDILOL 6.25 MG: 6.25 TABLET, FILM COATED ORAL at 16:28

## 2025-02-01 RX ADMIN — PREDNISONE 40 MG: 20 TABLET ORAL at 06:10

## 2025-02-01 RX ADMIN — APIXABAN 5 MG: 5 TABLET, FILM COATED ORAL at 16:28

## 2025-02-01 RX ADMIN — SENNOSIDES AND DOCUSATE SODIUM 2 TABLET: 50; 8.6 TABLET ORAL at 16:27

## 2025-02-01 RX ADMIN — OXYCODONE HYDROCHLORIDE 10 MG: 10 TABLET ORAL at 06:10

## 2025-02-01 RX ADMIN — DICLOFENAC SODIUM 2 G: 10 GEL TOPICAL at 21:50

## 2025-02-01 RX ADMIN — SACUBITRIL AND VALSARTAN 1 TABLET: 24; 26 TABLET, FILM COATED ORAL at 06:10

## 2025-02-01 RX ADMIN — FUROSEMIDE 20 MG: 20 TABLET ORAL at 06:09

## 2025-02-01 RX ADMIN — GABAPENTIN 600 MG: 300 CAPSULE ORAL at 06:09

## 2025-02-01 RX ADMIN — OXYCODONE HYDROCHLORIDE 10 MG: 10 TABLET ORAL at 11:26

## 2025-02-01 RX ADMIN — GABAPENTIN 600 MG: 300 CAPSULE ORAL at 11:26

## 2025-02-01 RX ADMIN — OXYCODONE HYDROCHLORIDE 10 MG: 10 TABLET ORAL at 20:03

## 2025-02-01 RX ADMIN — SACUBITRIL AND VALSARTAN 1 TABLET: 24; 26 TABLET, FILM COATED ORAL at 16:28

## 2025-02-01 RX ADMIN — GABAPENTIN 600 MG: 300 CAPSULE ORAL at 16:28

## 2025-02-01 RX ADMIN — CARVEDILOL 6.25 MG: 6.25 TABLET, FILM COATED ORAL at 08:06

## 2025-02-01 RX ADMIN — ALLOPURINOL 100 MG: 100 TABLET ORAL at 06:09

## 2025-02-01 ASSESSMENT — ENCOUNTER SYMPTOMS
PALPITATIONS: 0
WEAKNESS: 1
NAUSEA: 0
DIARRHEA: 0
HEADACHES: 0
CHILLS: 0
HEARTBURN: 0
SHORTNESS OF BREATH: 0
ABDOMINAL PAIN: 0
TINGLING: 1
DIZZINESS: 0
VOMITING: 0
COUGH: 0
FEVER: 0
FOCAL WEAKNESS: 1

## 2025-02-01 ASSESSMENT — PAIN DESCRIPTION - PAIN TYPE
TYPE: ACUTE PAIN
TYPE: ACUTE PAIN;CHRONIC PAIN

## 2025-02-01 NOTE — PROGRESS NOTES
[facility] Medicine Daily Progress Note    Date of Service  2/1/2025    Chief Complaint  Oracio Jurado is a 44 y.o. male admitted 1/26/2025 with ascending weakness/numbness and tingling.    Hospital Course    44-year-old male with history of cocaine abuse, PE on Eliquis presented 1/9 with weakness and numbness on his lower extremities. Patient states his symptoms started about 1 month before the admission, however progressively worsened the past 2 weeks making it difficult for the patient to walk. States it started at his feet, then to his knees and to his epigastric area below his rib cage with associated numbness and tingling and sciatic pain that radiates down his bilateral lower extremities, worse on the left. Denies any bowel or bladder incontinence, denies any saddle anesthesia, and does feel some muscle spasms over his torso area. Denies any shortness of breath or trouble breathing.  Denied any fever or drug abuse.  No new medication starts, denies any sore throat, no bloody diarrhea in the past few months. Patient transferred to [facility] for neurologic evaluation and MRI.  Urine drug screen was positive for cannabinoids, cocaine, fentanyl.  MRI for lumbar spine did not show any stenosis however for thoracic spine showed abnormal enhancement would be unlikely to represent multiple sclerosis, possible infection versus autoimmune myelitis also acute cord infarction involving the central gray matter.  Neurologist evaluated the patient who recommended discontinue Solu-Medrol which was started on admission and neurology team feels the patient has peripheral neuropathy versus toxic neuropathy.  No need for LP and he did not feel patient has MS or GBS.  B12 was very low at and homocystine was high, IM B12 supplementation was started.  Workup was also positive for an elevated methylmalonic acid as well as copper.  Some suspicion for neuropathy secondary to B12 deficiency denies any polysubstance abuse and having nitrous  oxide.  Patient also reported arthritic pain in left ankle and knee which she states is consistent with his prior episodes of gout.  Patient was started on oral prednisone.  Uric acid was within normal limits.  Patient was started on allopurinol  PT and OT evaluated the patient recommended placement.        Interval Problem Update  -Evaluated examined the patient at bedside, patient states his pain and numbness improved than yesterday  -Continue B12 supplementation IM and gabapentin  -Discussed with neurology team, patient will need more workup for neuropathy and MRI brain and C-spine was ordered.  -Labs reviewed around baseline, all images and labs were discussed in detail with the patient, answered all his questions    Patient was seen and examined at bedside.  I have personally reviewed and interpreted vitals, labs, and imaging.    1/28.  Afebrile.  Intermittent bradycardia.  On room air.  Denies fever, chills, chest pains.  States that when he puts his feet on the floor he gets shivering raising his feet all the way up to his torso so that he gets whole body shivers.  He does state strength is improving.  He was stabilized and sent today.  1/29.  Afebrile.  Intermittent bradycardia.  On room air.  Denies fever, chills, chest pains, shortness of breath.  Complains of constipation.  States the paresthesias in his legs is getting better.  Working to mobilize more.  1/30.  Afebrile.  Stable vitals.  On room air.  Denies fevers, chills, chest pains, shortness of breath.  Having persistent lower extremity paresthesias.  Cannot tolerate MRI brain and cervical this morning.  Will try again this afternoon with Ativan.  1/31.  Afebrile.  Intermittent bradycardia.  On room air.  Replete phosphorus.  Denies fever, chills, chest pains, shortness of breath paresthesias in lower extremities are about the same.  He does state arthritic pain mostly in his left ankle and knee which he attributes to gout.  Started on oral  prednisone, topical diclofenac.  Uric acid is within normal limits.  Patient still unable to tolerate MRI today even after Ativan.  Ordered MRI with contrast.  2/1.  Afebrile.  Stable vitals.  On room air.  Denies fever, chills, chest pains and shortness of breath.  Left ankle and and knee pain has resolved.  Numbness gets better every day.  He is now considering going home with home health wound and waiting for postacute placement with a [place] swing bed.  Discussed with neurology.  No need for MRI brain and cervical  Hgb 12.4 > 12.6  Na 133 > 137 > 135 > 134    I have discussed this patient's plan of care and discharge plan at IDT rounds today with Case Management, Nursing, Nursing leadership, and other members of the IDT team.    Consultants/Specialty  neurology    Code Status  Full Code    Disposition  The patient is not medically cleared for discharge to home or a post-acute facility.  Anticipate discharge to: home with organized home healthcare and close outpatient follow-up    I have placed the appropriate orders for post-discharge needs.    Review of Systems  Review of Systems   Constitutional:  Negative for chills, fever and malaise/fatigue.   Respiratory:  Negative for cough and shortness of breath.    Cardiovascular:  Negative for chest pain, palpitations and leg swelling.   Gastrointestinal:  Negative for abdominal pain, diarrhea, heartburn, nausea and vomiting.   Genitourinary:  Negative for dysuria, frequency and urgency.   Neurological:  Positive for tingling, focal weakness and weakness. Negative for dizziness and headaches.   All other systems reviewed and are negative.       Physical Exam  Temp:  [36.1 °C (97 °F)-36.5 °C (97.7 °F)] 36.4 °C (97.5 °F)  Pulse:  [60-78] 60  Resp:  [16-18] 17  BP: (104-132)/(59-78) 116/59  SpO2:  [93 %-96 %] 94 %    Physical Exam  Vitals and nursing note reviewed.   Constitutional:       General: He is not in acute distress.     Appearance: Normal appearance. He is not  ill-appearing.   HENT:      Head: Normocephalic and atraumatic.      Jaw: There is normal jaw occlusion.      Right Ear: Hearing normal.      Left Ear: Hearing normal.      Nose: Nose normal.      Mouth/Throat:      Lips: Pink.      Mouth: Mucous membranes are moist.   Eyes:      Extraocular Movements: Extraocular movements intact.      Conjunctiva/sclera: Conjunctivae normal.      Pupils: Pupils are equal, round, and reactive to light.   Neck:      Vascular: No carotid bruit.   Cardiovascular:      Rate and Rhythm: Normal rate and regular rhythm.      Pulses: Normal pulses.      Heart sounds: Normal heart sounds, S1 normal and S2 normal.   Pulmonary:      Effort: Pulmonary effort is normal.      Breath sounds: Normal breath sounds and air entry. No stridor.   Musculoskeletal:      Cervical back: Normal range of motion and neck supple.      Right lower leg: No edema.      Left lower leg: No edema.   Skin:     General: Skin is warm and dry.      Capillary Refill: Capillary refill takes less than 2 seconds.   Neurological:      Mental Status: He is alert and oriented to person, place, and time.      Sensory: Sensory deficit present.      Motor: Weakness present.      Gait: Gait abnormal.   Psychiatric:         Attention and Perception: Attention and perception normal.         Mood and Affect: Mood and affect normal.         Speech: Speech normal.         Behavior: Behavior normal. Behavior is cooperative.         Fluids    Intake/Output Summary (Last 24 hours) at 2/1/2025 0633  Last data filed at 2/1/2025 0100  Gross per 24 hour   Intake 520 ml   Output 2320 ml   Net -1800 ml        Laboratory  Recent Labs     01/30/25  0635 01/31/25  0424 02/01/25  0414   WBC 6.0 5.8 8.8   RBC 4.11* 4.17* 4.15*   HEMOGLOBIN 12.4* 12.4* 12.6*   HEMATOCRIT 36.4* 37.3* 36.5*   MCV 88.6 89.4 88.0   MCH 30.2 29.7 30.4   MCHC 34.1 33.2 34.5   RDW 43.5 43.5 41.7   PLATELETCT 255 254 252   MPV 9.3 9.2 8.6*     Recent Labs     01/30/25  0635  "01/31/25  0424 02/01/25  0414   SODIUM 137 135 134*   POTASSIUM 4.5 4.7 4.5   CHLORIDE 104 101 100   CO2 23 26 24   GLUCOSE 100* 98 121*   BUN 18 15 16   CREATININE 1.12 1.16 1.10   CALCIUM 8.7 9.7 9.6                   Imaging  MR-BRAIN-WITH & W/O         MR-CERVICAL SPINE-WITH & W/O         MR-LUMBAR SPINE-WITH & W/O   Final Result      1.  Transitional lumbosacral junction. Vestigial ribs at the T12 level. Partial sacralization of L5.   2.  L3-4 mild facet hypertrophy.   3.  L4-5 mild facet hypertrophy. Mild bilateral foraminal narrowing.   4.  L5-S1 mild right foraminal stenosis.   5.  Prominent epidural fat at the lower lumbar/lumbosacral levels. No central stenosis on the basis of disc disease or spondylotic change.      MR-THORACIC SPINE-WITH & W/O   Final Result      1.  Diffusely abnormal T2 hyperintense central cord signal from at least C6-7 down to T10-T11. Questionable corresponding enhancement demonstrated just at the T6-T7 level. Diffuse cord involvement in this fashion would be unlikely to represent multiple    sclerosis. Longitudinal multilevel cord involvement in this fashion may represent infectious or autoimmune myelitis. Viral etiologies such as West Nile virus might have this appearance. Acute cord infarction involves the central gray matter, however, the    pattern of involvement does not show the characteristic \"H\" morphology. Diffuse thoracic cord edema based on venous congestion can be seen associated with dural AV fistula. (Foix-Alajouanine syndrome). Finally, neuromyelitis optica (NMO) can manifest as    multilevel longitudinal cord lesions, however, these are usually more segmented with lesions spanning 3 or 4 levels but uncommonly the entire cord in continuity.           Assessment/Plan  * Neuropathy  Assessment & Plan  2/1/2025  Continue gabapentin  B12 is very low, started supplementation  Workup labs are pending  Neurology on board, appreciate recommendations  Consider Lyrica or " Cymbalta if needed  Improving    B12 deficiency  Assessment & Plan  2/1/2025  With the neuropathy and elevated homocystine, methylmalonic acid  Start with IM    Bilateral leg weakness- (present on admission)  Assessment & Plan  2/1/2025  For couple months before admission with tingling  MRI showed abnormal sinus with multiple differential diagnosis however neurologist feels the patient has neuropathy due to B12 deficiency and did not recommend any LP or other MRI and no need for steroid  PT and OT and PMR    Cocaine abuse (HCC)- (present on admission)  Assessment & Plan  2/1/2025  Urine drug screen was positive for marijuana and cocaine  Encouraged the patient to quit    Bilateral pulmonary embolism (HCC)- (present on admission)  Assessment & Plan  2/1/2025  Present on last admission, discharged 1/9/2025 found to have bilateral pulmonary emboli   Continue Eliquis         VTE prophylaxis: Apixaban    I have performed a physical exam and reviewed and updated ROS and Plan today (2/1/2025). In review of yesterday's note (1/31/2025), there are no changes except as documented above.    Greater than 50 minutes spent prepping to see patient (e.g. review of tests) obtaining and/or reviewing separately obtained history. Performing a medically appropriate examination and/ evaluation.  Counseling and educating the patient/family/caregiver.  Ordering medications, tests, or procedures.  Referring and communicating with other health care professionals.  Documenting clinical information in EPIC.  Independently interpreting results and communicating results to patient/family/caregiver.  Care coordination.

## 2025-02-01 NOTE — CARE PLAN
The patient is Stable - Low risk of patient condition declining or worsening    Shift Goals  Clinical Goals: Pain management, monitor neuro status  Patient Goals: Pain control, updates with POC  Family Goals: maria teresa    Progress made toward(s) clinical / shift goals:      Problem: Pain - Standard  Goal: Alleviation of pain or a reduction in pain to the patient’s comfort goal  Outcome: Progressing   Frequent pain assessments throughout shift. PRN pain medications provided per MAR and pt request. Pharmacological and non-pharmacological interventions utilized.      Problem: Neuro Status  Goal: Neuro status will remain stable or improve  Outcome: Progressing    Q4H neuro checks in place. Pt's neurological status (A/Ox4) remains unchanged throughout shift. Bed alarm is on, call light within reach.     Patient is not progressing towards the following goals:

## 2025-02-01 NOTE — CARE PLAN
The patient is Stable - Low risk of patient condition declining or worsening    Shift Goals  Clinical Goals: MRI and pain management  Patient Goals: pain control  Family Goals: maria teresa    Progress made toward(s) clinical / shift goals:    Problem: Pain - Standard  Goal: Alleviation of pain or a reduction in pain to the patient’s comfort goal  Outcome: Progressing  Note: Patients pain ranged from a 3-9 throughout this shift, patient was medicated per MAR with oxycodone and morphine for breakthrough pain. Patient stated his pain got down to a 3 and that was patients pain goal.        Patient is not progressing towards the following goals:      Problem: Mobility  Goal: Patient's capacity to carry out activities will improve  Outcome: Not Progressing  Note: Patient and PT stated patient is not walking today due to pain and that steroids were stopped and he believes that may be the problem. Patient started back on steroids and hopefully this will help with his walking.

## 2025-02-01 NOTE — DISCHARGE PLANNING
Called [facility] left a VM for Jaja in Admissions [phone]/ Called [facility] [phone] left a VM

## 2025-02-01 NOTE — CARE PLAN
Problem: Knowledge Deficit - Standard  Goal: Patient and family/care givers will demonstrate understanding of plan of care, disease process/condition, diagnostic tests and medications  Outcome: Progressing     Problem: Pain - Standard  Goal: Alleviation of pain or a reduction in pain to the patient’s comfort goal  Outcome: Progressing   The patient is Stable - Low risk of patient condition declining or worsening    Shift Goals  Clinical Goals: pain management  Patient Goals: pain control and updates  Family Goals: maria teresa    Progress made toward(s) clinical / shift goals:  progressing    Patient is not progressing towards the following goals:

## 2025-02-01 NOTE — PROGRESS NOTES
Acute Neurology Progress Note  Neurohospitalist Service, [facility]      Referring Physician: Gabriel Mckeon D.O.      Interval History: No acute events overnight. Patient unable to tolerate MRI scan even with prn ativan.     Review of systems: In addition to what is detailed in the HPI and/or updated in the interval history, all other systems reviewed and are negative.    Past Medical History, Past Surgical History and Social History reviewed and unchanged from prior    Medications:    Current Facility-Administered Medications:     diclofenac sodium (Voltaren) 1 % gel 2 g, 2 g, Topical, 4X/DAY PRN, Gabriel Mckeon D.O.    predniSONE (Deltasone) tablet 40 mg, 40 mg, Oral, DAILY, Gabriel Mckeon D.O., 40 mg at 02/01/25 0610    allopurinol (Zyloprim) tablet 100 mg, 100 mg, Oral, DAILY, Gabriel Mckeon D.O., 100 mg at 02/01/25 0609    senna-docusate (Pericolace Or Senokot S) 8.6-50 MG per tablet 2 Tablet, 2 Tablet, Oral, BID, 2 Tablet at 01/31/25 1729 **AND** polyethylene glycol/lytes (Miralax) Packet 1 Packet, 1 Packet, Oral, DAILY, Gabriel Mckeon D.O., 1 Packet at 01/29/25 1446    bisacodyl (Dulcolax) suppository 10 mg, 10 mg, Rectal, QDAY PRN, Gabriel Mckeon D.O.    acetaminophen (Tylenol) tablet 650 mg, 650 mg, Oral, Q6HRS PRN, Jorge Luis Jackson D.O., 650 mg at 01/28/25 0738    apixaban (Eliquis) tablet 5 mg, 5 mg, Oral, BID, LUIS MichaelOSharif, 5 mg at 02/01/25 0609    carvedilol (Coreg) tablet 6.25 mg, 6.25 mg, Oral, BID WITH MEALS, Jorge Luis Jackson D.O., 6.25 mg at 02/01/25 0806    sacubitril-valsartan (Entresto) 24-26 MG 1 Tablet, 1 Tablet, Oral, BID, Jorge Luis Jackson D.O., 1 Tablet at 02/01/25 0610    furosemide (Lasix) tablet 20 mg, 20 mg, Oral, DAILY, Jorge Luis Jackson D.O., 20 mg at 02/01/25 0609    Pharmacy Consult Request ...Pain Management Review 1 Each, 1 Each, Other, PHARMACY TO DOSE, [facility]    oxyCODONE immediate-release (Roxicodone)  "tablet 5 mg, 5 mg, Oral, Q3HRS PRN, 5 mg at 01/30/25 1645 **OR** oxyCODONE immediate release (Roxicodone) tablet 10 mg, 10 mg, Oral, Q3HRS PRN, 10 mg at 02/01/25 0610 **OR** morphine 4 MG/ML injection 4 mg, 4 mg, Intravenous, Q3HRS PRN, Jenae Mejia A.P.R.N., 4 mg at 01/31/25 1342    gabapentin (Neurontin) capsule 600 mg, 600 mg, Oral, TID, Vishal Hummel, A.P.R.N., 600 mg at 02/01/25 0609    [COMPLETED] cyanocobalamin (Vitamin B-12) injection 1,000 mcg, 1,000 mcg, Intramuscular, DAILY, 1,000 mcg at 01/28/25 0419 **FOLLOWED BY** cyanocobalamin (Vitamin B-12) injection 1,000 mcg, 1,000 mcg, Intramuscular, Q7 DAYS, 1,000 mcg at 01/29/25 0516 **FOLLOWED BY** [START ON 2/26/2025] cyanocobalamin (Vitamin B-12) injection 1,000 mcg, 1,000 mcg, Intramuscular, Q30 DAYS, Vishal Hummel, A.P.R.N.    Physical Examination:   /70   Pulse (!) 59   Temp 36.5 °C (97.7 °F) (Temporal)   Resp 18   Ht 1.803 m (5' 11\") Comment: per 's license  Wt (!) 141 kg (311 lb 1.1 oz)   SpO2 94%   BMI 43.39 kg/m²       General: Patient is awake and in no acute distress  Neck: There is normal range of motion  CV: Regular rate   Extremities:  Warm, dry, and intact, without peripheral lower extremity edema    NEUROLOGICAL EXAM:     Mental status: Awake, alert and fully oriented, follows commands  Speech and language: Speech is clear and fluent. The patient is able to name and repeat.  Cranial nerve exam:    CN II-III: PERRLA   CN II: Visual Fields Intact   CN III, IV, VI: EOMI w/o Nystagmus   CN V: Facial sensation intact, full strength with mastication   CN VII: Symmetrical facial movement   CN VIII: Hearing grossly normal   CN IX, X: Palate elevates at midline   CN XI: Symmetric shoulder shrug   CN XII: Tongue midline    Motor exam: There is sustained antigravity with no downward drift in bilateral arms and legs.  Normal tone/bulk. No tremors or pronator drift   RUE 5/5   LUE 5/5   RLL 4+/5   LLL " 4+/5     Reflexes:   Brachioradialus  R 2/ L 2   Biceps  R 2/ L 2   Triceps  R 2/ L 2   Patellar R 2/ L 2   Achilles R 2/ L 2  Unable to test babinski at this time due to pain    Sensory exam:  Reacts to tactile in all 4 extremities, almost back to his baseline. Reports to significantly improved weakness with transient pain.   Coordination: No ataxia on bilateral FTN testing      Objective Data:    Labs:  Lab Results   Component Value Date/Time    PROTHROMBTM 14.2 01/07/2025 03:06 AM    INR 1.10 01/07/2025 03:06 AM      Lab Results   Component Value Date/Time    WBC 8.8 02/01/2025 04:14 AM    RBC 4.15 (L) 02/01/2025 04:14 AM    HEMOGLOBIN 12.6 (L) 02/01/2025 04:14 AM    HEMATOCRIT 36.5 (L) 02/01/2025 04:14 AM    MCV 88.0 02/01/2025 04:14 AM    MCH 30.4 02/01/2025 04:14 AM    MCHC 34.5 02/01/2025 04:14 AM    MPV 8.6 (L) 02/01/2025 04:14 AM    NEUTSPOLYS 63.00 01/26/2025 06:43 AM    LYMPHOCYTES 23.40 01/26/2025 06:43 AM    MONOCYTES 9.70 01/26/2025 06:43 AM    EOSINOPHILS 3.10 01/26/2025 06:43 AM    BASOPHILS 0.50 01/26/2025 06:43 AM      Lab Results   Component Value Date/Time    SODIUM 134 (L) 02/01/2025 04:14 AM    POTASSIUM 4.5 02/01/2025 04:14 AM    CHLORIDE 100 02/01/2025 04:14 AM    CO2 24 02/01/2025 04:14 AM    GLUCOSE 121 (H) 02/01/2025 04:14 AM    BUN 16 02/01/2025 04:14 AM    CREATININE 1.10 02/01/2025 04:14 AM      Lab Results   Component Value Date/Time    CHOLSTRLTOT 139 01/06/2025 01:45 AM    LDL 84 01/06/2025 01:45 AM    HDL 41 01/06/2025 01:45 AM    TRIGLYCERIDE 70 01/06/2025 01:45 AM       Lab Results   Component Value Date/Time    ALKPHOSPHAT 75 01/27/2025 03:43 AM    ASTSGOT 16 01/27/2025 03:43 AM    ALTSGPT 19 01/27/2025 03:43 AM    TBILIRUBIN 0.5 01/27/2025 03:43 AM        Imaging/Testing:    I interpreted and/or reviewed the patient's neuroimaging    MR-LUMBAR SPINE-WITH & W/O   Final Result      1.  Transitional lumbosacral junction. Vestigial ribs at the T12 level. Partial sacralization of L5.  "  2.  L3-4 mild facet hypertrophy.   3.  L4-5 mild facet hypertrophy. Mild bilateral foraminal narrowing.   4.  L5-S1 mild right foraminal stenosis.   5.  Prominent epidural fat at the lower lumbar/lumbosacral levels. No central stenosis on the basis of disc disease or spondylotic change.      MR-THORACIC SPINE-WITH & W/O   Final Result      1.  Diffusely abnormal T2 hyperintense central cord signal from at least C6-7 down to T10-T11. Questionable corresponding enhancement demonstrated just at the T6-T7 level. Diffuse cord involvement in this fashion would be unlikely to represent multiple    sclerosis. Longitudinal multilevel cord involvement in this fashion may represent infectious or autoimmune myelitis. Viral etiologies such as West Nile virus might have this appearance. Acute cord infarction involves the central gray matter, however, the    pattern of involvement does not show the characteristic \"H\" morphology. Diffuse thoracic cord edema based on venous congestion can be seen associated with dural AV fistula. (Foix-Alajouanine syndrome). Finally, neuromyelitis optica (NMO) can manifest as    multilevel longitudinal cord lesions, however, these are usually more segmented with lesions spanning 3 or 4 levels but uncommonly the entire cord in continuity.         Latest Reference Range & Units 01/27/25 00:50   Amphetamines Urine Negative  Negative   Barbiturates Negative  Negative   Benzodiazepines Negative  Negative   Cannabinoid Metab Negative  Positive !   Cocaine Metabolite Negative  Positive !   Methadone Negative  Negative   Opiates Negative  Positive !   Oxycodone Negative  Positive !   Phencyclidine -Pcp Negative  Negative   Propoxyphene Negative  Negative   !: Data is abnormal     Latest Reference Range & Units 01/27/25 00:50   Fentanyl, Urine Negative  Positive !   !: Data is abnormal     Latest Reference Range & Units 01/26/25 12:04   Copper Serum 70.0 - 140.0 ug/dL 174.6 (H)   (H): Data is abnormally " "high     Latest Reference Range & Units 01/26/25 12:04   Vitamin B12 -True Cobalamin 211 - 911 pg/mL 156 (L)   (L): Data is abnormally low    01/29/25 1426  VITAMIN E  Collected: 01/26/25 1204  Final result  Specimen: Blood    Alpha-Tocopherol (Vit E) 5.3 Low  mg/L  Gamma-Tocopherol (Vit E) 1.0 mg/L           01/29/25 1420  METHYLMALONIC ACID, SERUM  Collected: 01/26/25 1204  Final result    Methylmalonic Acid, Serum 14.35 High  umol/L          Latest Reference Range & Units 01/26/25 12:04   Copper Serum 70.0 - 140.0 ug/dL 174.6 (H)   (H): Data is abnormally high     Latest Reference Range & Units 01/27/25 12:41   Aquaporin-4 Receptor IgG,Serum <1:10  <1:10       Impression & Recommendations:      44 year old male with a history of polysubstance abuse, heart failure, who presents with one month lower extremity weakness, pins and needles pain in feet, and subjective tightness in abdomen and thighs. No acute change overnight. MRI T & L spine shows extensive non-enhancing T2 lesion in posterior column of thoracis spine C6-T11 and severe B12 deficiency on labs. B12 currently being replaced, follow titration schedule ordered. States his strength also improves and only feels weakend upon a \"flare up of pain.\" AQP-4 shows negative. Unable to obtain MRI yesterday due to anxiety even with prn Ativan. Due to patients clinical improvement, MRI brain and C spine unlikely to change current management of care. MRI brain and C spine can be completed outpatient if necessary to see the extent of B12 deficiency injury. Methylmalonic acid resulted high at 14.35, consistent with B12 deficiency. Suspicion remains high for B12 deficiency in the setting of polysubstance abuse and huffing nitrous oxide.     Patient reports that he has a friend that lives with him and can help him around the house Monday through Friday. He also states he has additional help on the weekends with his neighbors and additional friends if needed. However, " patient is feeling much improved and feels he can do most of his ADL's by self. He states that his weakness is worsened during moments of increased pain and then improves significantly once pain has reduced. No further inpatient neurology work up needed at this time.     Plan:  -PT following- appreciate their recommendations   -Continue gabapentin 600 mg TID for neuropathy  -Continue replacement of B12 1000 mcg weekly x 4 weeks, then maintenance once monthly   -Will need outpatient neurology follow up-please place a referral upon discharge   -Neurology will sign off, please contact with any questions or concerns     TODD Juares  Acute Neurology        The evaluation of the patient, and recommended management, was discussed with Dr. Velasco, the attending Acute Neurologist. I have performed a physical exam and reviewed and updated ROS and Plan today (2/1/2025).      Please note that this dictation was created using voice recognition software.  I have made every reasonable attempt to correct obvious errors, but I expect that there are errors of grammar and possibly content that I did not discover before finalizing the note.

## 2025-02-01 NOTE — DISCHARGE PLANNING
DPA called [facility] left a VM for Jaja in Admissions [phone]/Advised CM  ERWIN called [facility] [phone] left a VM/Advised CM

## 2025-02-02 ENCOUNTER — PHARMACY VISIT (OUTPATIENT)
Dept: PHARMACY | Facility: MEDICAL CENTER | Age: 45
End: 2025-02-02
Payer: COMMERCIAL

## 2025-02-02 VITALS
DIASTOLIC BLOOD PRESSURE: 67 MMHG | BODY MASS INDEX: 43.55 KG/M2 | HEIGHT: 71 IN | OXYGEN SATURATION: 96 % | TEMPERATURE: 97.3 F | WEIGHT: 311.07 LBS | SYSTOLIC BLOOD PRESSURE: 121 MMHG | RESPIRATION RATE: 17 BRPM | HEART RATE: 67 BPM

## 2025-02-02 LAB
ALBUMIN SERPL BCP-MCNC: 3.9 G/DL (ref 3.2–4.9)
BUN SERPL-MCNC: 15 MG/DL (ref 8–22)
CALCIUM ALBUM COR SERPL-MCNC: 9.4 MG/DL (ref 8.5–10.5)
CALCIUM SERPL-MCNC: 9.3 MG/DL (ref 8.5–10.5)
CHLORIDE SERPL-SCNC: 99 MMOL/L (ref 96–112)
CO2 SERPL-SCNC: 26 MMOL/L (ref 20–33)
CREAT SERPL-MCNC: 1.05 MG/DL (ref 0.5–1.4)
ERYTHROCYTE [DISTWIDTH] IN BLOOD BY AUTOMATED COUNT: 42.9 FL (ref 35.9–50)
GFR SERPLBLD CREATININE-BSD FMLA CKD-EPI: 88 ML/MIN/1.73 M 2
GLUCOSE SERPL-MCNC: 111 MG/DL (ref 65–99)
HCT VFR BLD AUTO: 35.2 % (ref 42–52)
HGB BLD-MCNC: 12 G/DL (ref 14–18)
MAGNESIUM SERPL-MCNC: 2.1 MG/DL (ref 1.5–2.5)
MCH RBC QN AUTO: 29.7 PG (ref 27–33)
MCHC RBC AUTO-ENTMCNC: 34.1 G/DL (ref 32.3–36.5)
MCV RBC AUTO: 87.1 FL (ref 81.4–97.8)
PHOSPHATE SERPL-MCNC: 2.8 MG/DL (ref 2.5–4.5)
PLATELET # BLD AUTO: 242 K/UL (ref 164–446)
PMV BLD AUTO: 8.8 FL (ref 9–12.9)
POTASSIUM SERPL-SCNC: 3.9 MMOL/L (ref 3.6–5.5)
RBC # BLD AUTO: 4.04 M/UL (ref 4.7–6.1)
SODIUM SERPL-SCNC: 135 MMOL/L (ref 135–145)
WBC # BLD AUTO: 8.4 K/UL (ref 4.8–10.8)

## 2025-02-02 PROCEDURE — A9270 NON-COVERED ITEM OR SERVICE: HCPCS | Performed by: INTERNAL MEDICINE

## 2025-02-02 PROCEDURE — 700102 HCHG RX REV CODE 250 W/ 637 OVERRIDE(OP)

## 2025-02-02 PROCEDURE — A9270 NON-COVERED ITEM OR SERVICE: HCPCS

## 2025-02-02 PROCEDURE — 83735 ASSAY OF MAGNESIUM: CPT

## 2025-02-02 PROCEDURE — 80069 RENAL FUNCTION PANEL: CPT

## 2025-02-02 PROCEDURE — 85027 COMPLETE CBC AUTOMATED: CPT

## 2025-02-02 PROCEDURE — 700102 HCHG RX REV CODE 250 W/ 637 OVERRIDE(OP): Performed by: INTERNAL MEDICINE

## 2025-02-02 PROCEDURE — 36415 COLL VENOUS BLD VENIPUNCTURE: CPT

## 2025-02-02 PROCEDURE — 99239 HOSP IP/OBS DSCHRG MGMT >30: CPT | Performed by: INTERNAL MEDICINE

## 2025-02-02 PROCEDURE — RXMED WILLOW AMBULATORY MEDICATION CHARGE: Performed by: INTERNAL MEDICINE

## 2025-02-02 PROCEDURE — 97530 THERAPEUTIC ACTIVITIES: CPT

## 2025-02-02 PROCEDURE — 700111 HCHG RX REV CODE 636 W/ 250 OVERRIDE (IP): Performed by: INTERNAL MEDICINE

## 2025-02-02 RX ORDER — OXYCODONE HYDROCHLORIDE 5 MG/1
5 TABLET ORAL EVERY 6 HOURS PRN
Qty: 15 TABLET | Refills: 0 | Status: SHIPPED | OUTPATIENT
Start: 2025-02-02 | End: 2025-02-07

## 2025-02-02 RX ORDER — PREDNISONE 20 MG/1
40 TABLET ORAL DAILY
Qty: 4 TABLET | Refills: 0 | Status: SHIPPED | OUTPATIENT
Start: 2025-02-02 | End: 2025-02-04

## 2025-02-02 RX ORDER — ALLOPURINOL 100 MG/1
100 TABLET ORAL DAILY
Qty: 30 TABLET | Refills: 0 | Status: SHIPPED | OUTPATIENT
Start: 2025-02-03

## 2025-02-02 RX ORDER — CYANOCOBALAMIN 1000 UG/ML
INJECTION, SOLUTION INTRAMUSCULAR; SUBCUTANEOUS
Qty: 5 ML | Refills: 0 | Status: SHIPPED | OUTPATIENT
Start: 2025-02-05 | End: 2025-04-06

## 2025-02-02 RX ADMIN — GABAPENTIN 600 MG: 300 CAPSULE ORAL at 12:22

## 2025-02-02 RX ADMIN — SACUBITRIL AND VALSARTAN 1 TABLET: 24; 26 TABLET, FILM COATED ORAL at 04:26

## 2025-02-02 RX ADMIN — OXYCODONE HYDROCHLORIDE 10 MG: 10 TABLET ORAL at 04:27

## 2025-02-02 RX ADMIN — GABAPENTIN 600 MG: 300 CAPSULE ORAL at 04:26

## 2025-02-02 RX ADMIN — APIXABAN 5 MG: 5 TABLET, FILM COATED ORAL at 04:27

## 2025-02-02 RX ADMIN — CARVEDILOL 6.25 MG: 6.25 TABLET, FILM COATED ORAL at 09:40

## 2025-02-02 RX ADMIN — ALLOPURINOL 100 MG: 100 TABLET ORAL at 04:27

## 2025-02-02 RX ADMIN — PREDNISONE 40 MG: 20 TABLET ORAL at 04:26

## 2025-02-02 RX ADMIN — FUROSEMIDE 20 MG: 20 TABLET ORAL at 04:27

## 2025-02-02 RX ADMIN — SENNOSIDES AND DOCUSATE SODIUM 2 TABLET: 50; 8.6 TABLET ORAL at 04:26

## 2025-02-02 ASSESSMENT — PAIN DESCRIPTION - PAIN TYPE
TYPE: ACUTE PAIN
TYPE: ACUTE PAIN

## 2025-02-02 ASSESSMENT — GAIT ASSESSMENTS
ASSISTIVE DEVICE: FRONT WHEEL WALKER
DISTANCE (FEET): 200
DEVIATION: BRADYKINETIC;DECREASED HEEL STRIKE;DECREASED TOE OFF
GAIT LEVEL OF ASSIST: STANDBY ASSIST

## 2025-02-02 ASSESSMENT — COGNITIVE AND FUNCTIONAL STATUS - GENERAL
TURNING FROM BACK TO SIDE WHILE IN FLAT BAD: A LITTLE
SUGGESTED CMS G CODE MODIFIER MOBILITY: CK
MOVING TO AND FROM BED TO CHAIR: A LITTLE
CLIMB 3 TO 5 STEPS WITH RAILING: A LITTLE
STANDING UP FROM CHAIR USING ARMS: A LITTLE
WALKING IN HOSPITAL ROOM: A LITTLE
MOBILITY SCORE: 19

## 2025-02-02 NOTE — DISCHARGE PLANNING
Case Management Discharge Planning    Admission Date: 1/26/2025  GMLOS: 3.9  ALOS: 7    6-Clicks ADL Score: 19  6-Clicks Mobility Score: 19      Anticipated Discharge Dispo: Discharge Disposition: D/T to home under HHA care in anticipation of covered skilled care (06)  Discharge Address: [address] [facility] [address]  Discharge Contact Phone Number: [phone]    DME Needed: No    Action(s) Taken: Updated Provider/Nurse on Discharge Plan    Pt was dicussed in IDT  rounds .  Pt now prefers to DC to home with HH.   Pt is from [place]  and has [facility].     HH choice faxed to ERWIN garcia. [facility] sent to [facility] as [facility] is the only  that goes to [facility].       Pt is still pending with [facility]   Escalations Completed: None    Medically Clear: No    Next Steps:   CM to  continue to assist Pt with discharge as needed    Barriers to Discharge:   Medical clearance  Pending Home Health set up    Is the patient up for discharge tomorrow: No

## 2025-02-02 NOTE — DISCHARGE INSTRUCTIONS
Discharge Instructions per Dr. Gabriel Mckeon D.O.    DIET: Diet Order Diet: Cardiac    ACTIVITY: As tolerated    A proper diet that is low in grease, fat, and salt, along with 30 minutes of exercise per day will lead to weight loss, and better controlled blood sugar and blood pressure.    DIAGNOSIS: Neuropathy    Follow up with your Primary Care Provider as scheduled or sooner if your symptoms persist or worsen.  Return to Emergency Room for sever chest pain, shortness of breath, signs of a stroke, or any other emergencies.

## 2025-02-02 NOTE — DISCHARGE SUMMARY
Discharge Summary    CHIEF COMPLAINT ON ADMISSION  No chief complaint on file.      Reason for Admission  Numbness to legs     Admission Date  1/26/2025    CODE STATUS  Full Code    HPI & HOSPITAL COURSE  44-year-old male with history of cocaine abuse, PE on Eliquis presented 1/9 with weakness and numbness on his lower extremities. Patient states his symptoms started about 1 month before the admission, however progressively worsened the past 2 weeks making it difficult for the patient to walk. States it started at his feet, then to his knees and to his epigastric area below his rib cage with associated numbness and tingling and sciatic pain that radiates down his bilateral lower extremities, worse on the left. Denies any bowel or bladder incontinence, denies any saddle anesthesia, and does feel some muscle spasms over his torso area. Denies any shortness of breath or trouble breathing.  Denied any fever or drug abuse, no new medication starts..  Denies any sore throat, no bloody diarrhea in the past few months. Patient transferred to [facility] for neurologic evaluation and MRI.  Urine drug screen was positive for cannabinoids, cocaine, fentanyl.  MRI for lumbar spine did not show any stenosis however for thoracic spine showed abnormal enhancement would be unlikely to represent multiple sclerosis, possible infection versus autoimmune myelitis also acute cord infarction involving the central gray matter.  Neurologist evaluated the patient who recommended discontinue Solu-Medrol which was started on admission and neurology team feels the patient has peripheral neuropathy versus toxic neuropathy.  No need for LP and he did not feel patient has MS or GBS.  B12 was very low at and homocystine was high, IM B12 supplementation was started.  Workup was also positive for an elevated methylmalonic acid as well as copper.  Suspicion for neuropathy secondary to B12 deficiency in the setting of polysubstance abuse and huffing  nitrous oxide.  Patient also reported arthritic pain in left ankle and knee which she states is consistent with his prior episodes of gout.  Patient was started on oral prednisone.  Uric acid was within normal limits.  Patient was started on allopurinol  PT and OT evaluated the patient recommended placement.  Patient did improve and did discharge PT recommended discharge with home health.  Patient lives in [place] with not a lot of resources.  Referrals were sent to [facility].  Patient feels he will be a candidate home even if home health is not established.  I did order outpatient physical therapy in case home health was not approved.  Medically stable to discharge home.  Continue IM vitamin B12 injections weekly.  Follow-up with primary care as outpatient.    Therefore, he is discharged in fair and stable condition to home with organized home healthcare and close outpatient follow-up.    The patient met 2-midnight criteria for an inpatient stay at the time of discharge.    Discharge Date  2/2/2025      FOLLOW UP ITEMS POST DISCHARGE  None    DISCHARGE DIAGNOSES  Principal Problem:    Neuropathy (POA: Yes)  Active Problems:    Bilateral pulmonary embolism (HCC) (POA: Yes)    Cocaine abuse (HCC) (POA: Yes)    Bilateral leg weakness (POA: Yes)    B12 deficiency (POA: Yes)  Resolved Problems:    * No resolved hospital problems. *      FOLLOW UP  Follow-up with primary care as outpatient.    MEDICATIONS ON DISCHARGE     Medication List        START taking these medications        Instructions   allopurinol 100 MG Tabs  Start taking on: February 3, 2025  Commonly known as: Zyloprim   Take 1 Tablet by mouth every day.  Dose: 100 mg     cyanocobalamin 1000 MCG/ML Soln  Start taking on: February 5, 2025  Commonly known as: Vitamin B-12   Inject 1 mL into the shoulder, thigh, or buttocks every 7 days for 30 days, THEN 1 mL every 30 days for 30 days.     diclofenac sodium 1 % Gel  Commonly known as: Voltaren   Apply 2 g  topically 4 times a day as needed (joint pain).  Dose: 2 g     oxyCODONE immediate-release 5 MG Tabs  Commonly known as: Roxicodone   Take 1 Tablet by mouth every 6 hours as needed for Severe Pain for up to 5 days.  Dose: 5 mg     predniSONE 20 MG Tabs  Commonly known as: Deltasone   Take 2 Tablets by mouth every day for 2 days.  Dose: 40 mg            CONTINUE taking these medications        Instructions   apixaban 5mg Tabs  Commonly known as: Eliquis   Take 1 Tablet by mouth 2 times a day for 30 days.  Dose: 5 mg     carvedilol 6.25 MG Tabs  Commonly known as: Coreg   Take 6.25 mg by mouth 2 times a day with meals.  Dose: 6.25 mg     Entresto 24-26 MG Tabs  Generic drug: sacubitril-valsartan   Doctor's comments: Please call to schedule follow up appointment for further refills. Please call [phone]. Thank you.  Take 1 tablet by mouth twice daily  Dose: 1 Tablet     furosemide 20 MG Tabs  Commonly known as: Lasix   Take 1 Tablet by mouth every day.  Dose: 20 mg     gabapentin 100 MG Caps  Commonly known as: Neurontin   Take 2 Capsules by mouth 3 times a day.  Dose: 200 mg     indomethacin 50 MG Caps  Commonly known as: Indocin   Take 50 mg by mouth every 8 hours as needed (gout).  Dose: 50 mg     OZEMPIC (0.25 OR 0.5 MG/DOSE) SC   Inject 0.25 mg under the skin every 7 days.  Dose: 0.25 mg              Allergies  No Known Allergies    DIET  Orders Placed This Encounter   Procedures    Diet Order Diet: Cardiac     Standing Status:   Standing     Number of Occurrences:   1     Order Specific Question:   Diet:     Answer:   Cardiac [6]       ACTIVITY  As tolerated.  Weight bearing as tolerated    CONSULTATIONS  Neuro    PROCEDURES  None    LABORATORY  Lab Results   Component Value Date    SODIUM 135 02/02/2025    POTASSIUM 3.9 02/02/2025    CHLORIDE 99 02/02/2025    CO2 26 02/02/2025    GLUCOSE 111 (H) 02/02/2025    BUN 15 02/02/2025    CREATININE 1.05 02/02/2025        Lab Results   Component Value Date    WBC 8.4  02/02/2025    HEMOGLOBIN 12.0 (L) 02/02/2025    HEMATOCRIT 35.2 (L) 02/02/2025    PLATELETCT 242 02/02/2025        I discussed medications and side effects with the patient.  I discussed prognosis and importance of medical compliance with the patient.  I counseled the patient about diet, exercise, weight loss, smoking cessation, and life style modifications.  All questions and concerns have been addressed.  Total time of the discharge process was 37 minutes.

## 2025-02-02 NOTE — FACE TO FACE
Face to Face Supporting Documentation - Home Health    The encounter with this patient was in whole or in part the primary reason for home health admission.    Date of encounter:   Patient:                    MRN:                       YOB: 2027  Oracio Jurado  [id number]  1980     Home health to see patient for:  Skilled Nursing care for assessment, interventions & education    Skilled need for:  Recent Deterioration of Health Status lower extremity weakness and neuropathy secondary to B12 deficiency      Homebound status evidenced by:  Needs the assistance of another person in order to leave the home. Leaving home requires a considerable and taxing effort. There is a normal inability to leave the home.    Community Physician to provide follow up care: Pcp Not In Computer     Optional Interventions? No      I certify the face to face encounter for this home health care referral meets the CMS requirements and the encounter/clinical assessment with the patient was, in whole, or in part, for the medical condition(s) listed above, which is the primary reason for home health care. Based on my clinical findings: the service(s) are medically necessary, support the need for home health care, and the homebound criteria are met.  I certify that this patient has had a face to face encounter by myself.  Gabriel Mckeon D.O. - NPI: [id number]

## 2025-02-02 NOTE — CARE PLAN
The patient is Stable - Low risk of patient condition declining or worsening    Shift Goals  Clinical Goals: Pain Management, Maintain Skin Integrity  Patient Goals: Pain Control, Something to drink  Family Goals: JOSE    Progress made toward(s) clinical / shift goals:    Problem: Pain - Standard  Goal: Alleviation of pain or a reduction in pain to the patient’s comfort goal  Outcome: Progressing     Problem: Fall Risk  Goal: Patient will remain free from falls  Outcome: Progressing     Problem: Neuro Status  Goal: Neuro status will remain stable or improve  Outcome: Progressing     Problem: Skin Integrity  Goal: Skin integrity is maintained or improved  Outcome: Progressing       Patient is not progressing towards the following goals:

## 2025-02-02 NOTE — THERAPY
Physical Therapy   Daily Treatment     Patient Name: Oracio Jurado  Age:  44 y.o., Sex:  male  Medical Record #: [id number]  Today's Date: 2/2/2025    Precautions: Fall Risk    Assessment    Pt seen for PT tx session. He c/o R hip soreness upon first waking. Significant improvement in mobility today progressing to amb x200ft around unit with use of FWW. Pt would benefit from HHPT upon DC home. Reviewed sequencing to negotiate FWW to step to enter house. PT to cont to follow.    Plan    Treatment Plan Status: Continue Current Treatment Plan  Type of Treatment: Bed Mobility, Equipment, Family / Caregiver Training, Gait Training, Neuro Re-Education / Balance, Stair Training, Therapeutic Activities, Therapeutic Exercise  Treatment Frequency: 4 Times per Week  Treatment Duration: Until Therapy Goals Met    DC Equipment Recommendations: None (has multiple walkers and WC if needed)  Discharge Recommendations: Recommend home health for continued physical therapy services     Objective      Cognition    Cognition / Consciousness WDL   Level of Consciousness Alert   Balance   Sitting Balance (Static) Fair +   Sitting Balance (Dynamic) Fair +   Standing Balance (Static) Fair   Standing Balance (Dynamic) Fair   Weight Shift Sitting Fair   Weight Shift Standing Fair   Skilled Intervention Verbal Cuing;Sequencing   Comments w/ FWW   Bed Mobility    Supine to Sit Supervised   Sit to Supine   (up in chair post)   Gait Analysis   Gait Level Of Assist Standby Assist   Assistive Device Front Wheel Walker   Distance (Feet) 200   # of Times Distance was Traveled 1   Deviation Bradykinetic;Decreased Heel Strike;Decreased Toe Off   # of Stairs Climbed 0   Weight Bearing Status no restrictions   Skilled Intervention Verbal Cuing   Functional Mobility   Sit to Stand Standby Assist   Bed, Chair, Wheelchair Transfer Standby Assist   Transfer Method Stand Step   Skilled Intervention Verbal Cuing   6 Clicks Assessment - How much HELP from from  another person do you currently need... (If the patient hasn't done an activity recently, how much help from another person do you think he/she would need if he/she tried?)   Turning from your back to your side while in a flat bed without using bedrails? 3   Moving from lying on your back to sitting on the side of a flat bed without using bedrails? 4   Moving to and from a bed to a chair (including a wheelchair)? 3   Standing up from a chair using your arms (e.g., wheelchair, or bedside chair)? 3   Walking in hospital room? 3   Climbing 3-5 steps with a railing? 3   6 clicks Mobility Score 19   Activity Tolerance   Sitting in Chair post session   Sitting Edge of Bed 2 mins   Standing 10 mins   Short Term Goals    Short Term Goal # 1 Patient will perform supine-sit, sit-supine with HOB flat without rails with supervision in 6 visits to safely get in & out of bed   Goal Outcome # 1 Progressing as expected   Short Term Goal # 2 Patient will perform sit-stand with LRAD with supervision in 6 visits to progress with functional mobility   Goal Outcome # 2 Progressing as expected   Short Term Goal # 3 Patient will perform chair transfers with LRAD with supervision in 6 visits to safely get OOB to chair   Goal Outcome # 3 Progressing as expected   Short Term Goal # 4 Patient will ambulate 100 feet with LRAD with supervision in 6 visits to safely ambulate household distance   Goal Outcome # 4 Progressing as expected   Short Term Goal # 5 Patient will negotiate 1 step threshold with LRAD with supervision in 6 visits to safely get in & out home   Goal Outcome # 5 Goal not met